# Patient Record
Sex: FEMALE | Race: BLACK OR AFRICAN AMERICAN | Employment: OTHER | URBAN - METROPOLITAN AREA
[De-identification: names, ages, dates, MRNs, and addresses within clinical notes are randomized per-mention and may not be internally consistent; named-entity substitution may affect disease eponyms.]

---

## 2019-05-29 ENCOUNTER — APPOINTMENT (OUTPATIENT)
Dept: GENERAL RADIOLOGY | Age: 84
End: 2019-05-29
Attending: EMERGENCY MEDICINE
Payer: MEDICARE

## 2019-05-29 ENCOUNTER — HOSPITAL ENCOUNTER (EMERGENCY)
Age: 84
Discharge: HOME | End: 2019-05-29
Attending: EMERGENCY MEDICINE
Payer: MEDICARE

## 2019-05-29 VITALS
DIASTOLIC BLOOD PRESSURE: 71 MMHG | BODY MASS INDEX: 22.33 KG/M2 | OXYGEN SATURATION: 99 % | WEIGHT: 134 LBS | RESPIRATION RATE: 16 BRPM | SYSTOLIC BLOOD PRESSURE: 153 MMHG | HEIGHT: 65 IN | HEART RATE: 58 BPM | TEMPERATURE: 98.1 F

## 2019-05-29 DIAGNOSIS — K59.00 CONSTIPATION, UNSPECIFIED CONSTIPATION TYPE: Primary | ICD-10-CM

## 2019-05-29 LAB
ALBUMIN SERPL-MCNC: 3.8 G/DL (ref 3.2–4.6)
ALBUMIN/GLOB SERPL: 1.1 {RATIO} (ref 1.2–3.5)
ALP SERPL-CCNC: 81 U/L (ref 50–136)
ALT SERPL-CCNC: 18 U/L (ref 12–65)
ANION GAP SERPL CALC-SCNC: 9 MMOL/L (ref 7–16)
AST SERPL-CCNC: 23 U/L (ref 15–37)
BASOPHILS # BLD: 0 K/UL (ref 0–0.2)
BASOPHILS NFR BLD: 0 % (ref 0–2)
BILIRUB SERPL-MCNC: 1.2 MG/DL (ref 0.2–1.1)
BUN SERPL-MCNC: 11 MG/DL (ref 8–23)
CALCIUM SERPL-MCNC: 10 MG/DL (ref 8.3–10.4)
CHLORIDE SERPL-SCNC: 103 MMOL/L (ref 98–107)
CO2 SERPL-SCNC: 28 MMOL/L (ref 21–32)
CREAT SERPL-MCNC: 0.91 MG/DL (ref 0.6–1)
DIFFERENTIAL METHOD BLD: ABNORMAL
EOSINOPHIL # BLD: 0 K/UL (ref 0–0.8)
EOSINOPHIL NFR BLD: 1 % (ref 0.5–7.8)
ERYTHROCYTE [DISTWIDTH] IN BLOOD BY AUTOMATED COUNT: 12.8 % (ref 11.9–14.6)
GLOBULIN SER CALC-MCNC: 3.6 G/DL (ref 2.3–3.5)
GLUCOSE SERPL-MCNC: 140 MG/DL (ref 65–100)
HCT VFR BLD AUTO: 34.9 % (ref 35.8–46.3)
HGB BLD-MCNC: 11.1 G/DL (ref 11.7–15.4)
IMM GRANULOCYTES # BLD AUTO: 0 K/UL (ref 0–0.5)
IMM GRANULOCYTES NFR BLD AUTO: 0 % (ref 0–5)
LIPASE SERPL-CCNC: 113 U/L (ref 73–393)
LYMPHOCYTES # BLD: 1.3 K/UL (ref 0.5–4.6)
LYMPHOCYTES NFR BLD: 18 % (ref 13–44)
MCH RBC QN AUTO: 27.9 PG (ref 26.1–32.9)
MCHC RBC AUTO-ENTMCNC: 31.8 G/DL (ref 31.4–35)
MCV RBC AUTO: 87.7 FL (ref 79.6–97.8)
MONOCYTES # BLD: 0.4 K/UL (ref 0.1–1.3)
MONOCYTES NFR BLD: 5 % (ref 4–12)
NEUTS SEG # BLD: 5.9 K/UL (ref 1.7–8.2)
NEUTS SEG NFR BLD: 77 % (ref 43–78)
NRBC # BLD: 0 K/UL (ref 0–0.2)
PLATELET # BLD AUTO: 227 K/UL (ref 150–450)
PMV BLD AUTO: 10.3 FL (ref 9.4–12.3)
POTASSIUM SERPL-SCNC: 3.5 MMOL/L (ref 3.5–5.1)
PROT SERPL-MCNC: 7.4 G/DL (ref 6.3–8.2)
RBC # BLD AUTO: 3.98 M/UL (ref 4.05–5.2)
SODIUM SERPL-SCNC: 140 MMOL/L (ref 136–145)
WBC # BLD AUTO: 7.6 K/UL (ref 4.3–11.1)

## 2019-05-29 PROCEDURE — 99282 EMERGENCY DEPT VISIT SF MDM: CPT | Performed by: EMERGENCY MEDICINE

## 2019-05-29 PROCEDURE — 83690 ASSAY OF LIPASE: CPT

## 2019-05-29 PROCEDURE — 80053 COMPREHEN METABOLIC PANEL: CPT

## 2019-05-29 PROCEDURE — 74022 RADEX COMPL AQT ABD SERIES: CPT

## 2019-05-29 PROCEDURE — 85025 COMPLETE CBC W/AUTO DIFF WBC: CPT

## 2019-05-29 RX ORDER — LEVOTHYROXINE SODIUM 100 UG/1
TABLET ORAL
COMMUNITY

## 2019-05-29 RX ORDER — MECLIZINE HYDROCHLORIDE 25 MG/1
TABLET ORAL
COMMUNITY

## 2019-05-29 RX ORDER — LISINOPRIL 10 MG/1
TABLET ORAL DAILY
COMMUNITY

## 2019-05-29 RX ORDER — DOCUSATE SODIUM 100 MG/1
100 CAPSULE, LIQUID FILLED ORAL 2 TIMES DAILY
Qty: 60 CAP | Refills: 0 | Status: SHIPPED | OUTPATIENT
Start: 2019-05-29 | End: 2019-08-27

## 2019-05-29 NOTE — ED NOTES
Patient and her family are refusing any lab work or imaging. Dr. Shanda Salter was notified, and went to speak with the patient and her family members. Once an agreement was made to have the labs done, patient was transported to [facility] .

## 2019-05-29 NOTE — ED NOTES
I have reviewed discharge instructions with the patient and son. The patient and son verbalized understanding. Patient left ED via Discharge Method: ambulatory to Home with family. Opportunity for questions and clarification provided. Patient given 1 scripts. To continue your aftercare when you leave the hospital, you may receive an automated call from our care team to check in on how you are doing. This is a free service and part of our promise to provide the best care and service to meet your aftercare needs.  If you have questions, or wish to unsubscribe from this service please call [phone]. Thank you for Choosing our [facility] Emergency Department.

## 2019-05-29 NOTE — ED PROVIDER NOTES
Patient is an 82 yo female with constipation. States intermittent episodes of constipation for the past 2 months and has been given polyethylene glycol in the past which helps however again was constipated today and took the polyethylene glycol again this morning and had a large BM on arrival today to the ED and states she feels much better. Denies any abdominal pain at this time, no nausea or vomiting, no fevers or chills, no further complaints. Overall patient is very well appearing, in NAD. Past Medical History:   Diagnosis Date    Diabetes ([facility] [address]     Hypertension        History reviewed. No pertinent surgical history. History reviewed. No pertinent family history.     Social History     Socioeconomic History    Marital status:      Spouse name: Not on file    Number of children: Not on file    Years of education: Not on file    Highest education level: Not on file   Occupational History    Not on file   Social Needs    Financial resource strain: Not on file    Food insecurity:     Worry: Not on file     Inability: Not on file    Transportation needs:     Medical: Not on file     Non-medical: Not on file   Tobacco Use    Smoking status: Not on file   Substance and Sexual Activity    Alcohol use: Not on file    Drug use: Not on file    Sexual activity: Not on file   Lifestyle    Physical activity:     Days per week: Not on file     Minutes per session: Not on file    Stress: Not on file   Relationships    Social connections:     Talks on phone: Not on file     Gets together: Not on file     Attends Protestant service: Not on file     Active member of club or organization: Not on file     Attends meetings of clubs or organizations: Not on file     Relationship status: Not on file    Intimate partner violence:     Fear of current or ex partner: Not on file     Emotionally abused: Not on file     Physically abused: Not on file     Forced sexual activity: Not on file   Other Topics Concern    Not on file   Social History Narrative    Not on file         ALLERGIES: Patient has no known allergies. Review of Systems   Constitutional: Negative for chills and fever. HENT: Negative for rhinorrhea and sore throat. Eyes: Negative for visual disturbance. Respiratory: Negative for cough and shortness of breath. Cardiovascular: Negative for chest pain and leg swelling. Gastrointestinal: Positive for constipation. Negative for abdominal pain, diarrhea, nausea and vomiting. Genitourinary: Negative for dysuria. Musculoskeletal: Negative for back pain and neck pain. Skin: Negative for rash. Neurological: Negative for weakness and headaches. Psychiatric/Behavioral: The patient is not nervous/anxious. Vitals:    05/29/19 1154   BP: 153/71   Pulse: (!) 58   Resp: 16   Temp: 98.1 °F (36.7 °C)   SpO2: 99%   Weight: 60.8 kg (134 lb)   Height: 5' 5\" (1.651 m)            Physical Exam   Constitutional: She is oriented to person, place, and time. She appears well-developed and well-nourished. HENT:   Head: Normocephalic. Right Ear: External ear normal.   Left Ear: External ear normal.   Eyes: Pupils are equal, round, and reactive to light. Conjunctivae and EOM are normal.   Neck: Normal range of motion. Neck supple. No tracheal deviation present. Cardiovascular: Normal rate, regular rhythm, normal heart sounds and intact distal pulses. No murmur heard. Pulmonary/Chest: Effort normal and breath sounds normal. No respiratory distress. Abdominal: Soft. There is no tenderness. Genitourinary:   Genitourinary Comments: Soft stool in vault without impaction. Fecal occult negative. Musculoskeletal: Normal range of motion. Neurological: She is alert and oriented to person, place, and time. No cranial nerve deficit. Skin: No rash noted. Nursing note and vitals reviewed.        MDM  Number of Diagnoses or Management Options  Constipation, unspecified constipation type: new and requires workup     Amount and/or Complexity of Data Reviewed  Clinical lab tests: ordered and reviewed  Tests in the radiology section of CPT®: ordered and reviewed  Review and summarize past medical records: yes    Risk of Complications, Morbidity, and/or Mortality  Presenting problems: high  Diagnostic procedures: high  Management options: high    Patient Progress  Patient progress: stable         Procedures  Recent Results (from the past 12 hour(s))   CBC WITH AUTOMATED DIFF    Collection Time: 05/29/19  4:04 PM   Result Value Ref Range    WBC 7.6 4.3 - 11.1 K/uL    RBC 3.98 (L) 4.05 - 5.2 M/uL    HGB 11.1 (L) 11.7 - 15.4 g/dL    HCT 34.9 (L) 35.8 - 46.3 %    MCV 87.7 79.6 - 97.8 FL    MCH 27.9 26.1 - 32.9 PG    MCHC 31.8 31.4 - 35.0 g/dL    RDW 12.8 11.9 - 14.6 %    PLATELET 194 366 - 950 K/uL    MPV 10.3 9.4 - 12.3 FL    ABSOLUTE NRBC 0.00 0.0 - 0.2 K/uL    DF AUTOMATED      NEUTROPHILS 77 43 - 78 %    LYMPHOCYTES 18 13 - 44 %    MONOCYTES 5 4.0 - 12.0 %    EOSINOPHILS 1 0.5 - 7.8 %    BASOPHILS 0 0.0 - 2.0 %    IMMATURE GRANULOCYTES 0 0.0 - 5.0 %    ABS. NEUTROPHILS 5.9 1.7 - 8.2 K/UL    ABS. LYMPHOCYTES 1.3 0.5 - 4.6 K/UL    ABS. MONOCYTES 0.4 0.1 - 1.3 K/UL    ABS. EOSINOPHILS 0.0 0.0 - 0.8 K/UL    ABS. BASOPHILS 0.0 0.0 - 0.2 K/UL    ABS. IMM. GRANS. 0.0 0.0 - 0.5 K/UL   METABOLIC PANEL, COMPREHENSIVE    Collection Time: 05/29/19  4:04 PM   Result Value Ref Range    Sodium 140 136 - 145 mmol/L    Potassium 3.5 3.5 - 5.1 mmol/L    Chloride 103 98 - 107 mmol/L    CO2 28 21 - 32 mmol/L    Anion gap 9 7 - 16 mmol/L    Glucose 140 (H) 65 - 100 mg/dL    BUN 11 8 - 23 MG/DL    Creatinine 0.91 0.6 - 1.0 MG/DL    GFR est AA >60 >60 ml/min/1.73m2    GFR est non-AA >60 >60 ml/min/1.73m2    Calcium 10.0 8.3 - 10.4 MG/DL    Bilirubin, total 1.2 (H) 0.2 - 1.1 MG/DL    ALT (SGPT) 18 12 - 65 U/L    AST (SGOT) 23 15 - 37 U/L    Alk.  phosphatase 81 50 - 136 U/L    Protein, total 7.4 6.3 - 8.2 g/dL    Albumin 3.8 3.2 - 4.6 g/dL    Globulin 3.6 (H) 2.3 - 3.5 g/dL    A-G Ratio 1.1 (L) 1.2 - 3.5     LIPASE    Collection Time: 05/29/19  4:04 PM   Result Value Ref Range    Lipase 113 73 - 393 U/L     Xr Abd Acute W 1 V Chest    Result Date: 5/29/2019  History: Constipation, chronic EXAM: Abdominal series FINDINGS: There is linear scarring in the left perihilar region. The bowel gas pattern is nonspecific without evidence of free air or obstruction. Stool noted in the rectum. IMPRESSION: No evidence of free air or obstruction. Large stool seen in the rectum. 82 yo with constipation:    I placed patient on colace and instructed her to take it DAILY and discussed importance to help keep her regular in her BMS. I reviewed chart from [facility] and patient being worked up for stromal tumor with CT performed 2 weeks ago and has been seen by GI and scheduled for further workup on the 11th of June which I stressed importance continuing this workup as well or to return with any further pain or any further concerns. Patient laughing, smiling and in full agreement with plan.

## 2019-05-29 NOTE — ED TRIAGE NOTES
Pt states she has been having constipation after taking metformin for a few days. States she took a laxative yesterday and had some watery stool but can still feel stool in rectum.

## 2019-09-11 ENCOUNTER — APPOINTMENT (OUTPATIENT)
Dept: CT IMAGING | Age: 84
End: 2019-09-11
Attending: EMERGENCY MEDICINE
Payer: MEDICARE

## 2019-09-11 ENCOUNTER — HOSPITAL ENCOUNTER (EMERGENCY)
Age: 84
Discharge: HOME | End: 2019-09-11
Attending: EMERGENCY MEDICINE
Payer: MEDICARE

## 2019-09-11 VITALS
OXYGEN SATURATION: 100 % | TEMPERATURE: 98 F | RESPIRATION RATE: 14 BRPM | WEIGHT: 134 LBS | HEIGHT: 65 IN | HEART RATE: 54 BPM | SYSTOLIC BLOOD PRESSURE: 188 MMHG | BODY MASS INDEX: 22.33 KG/M2 | DIASTOLIC BLOOD PRESSURE: 79 MMHG

## 2019-09-11 DIAGNOSIS — R42 VERTIGO: Primary | ICD-10-CM

## 2019-09-11 LAB
ALBUMIN SERPL-MCNC: 3.5 G/DL (ref 3.2–4.6)
ALBUMIN/GLOB SERPL: 0.9 {RATIO} (ref 1.2–3.5)
ALP SERPL-CCNC: 75 U/L (ref 50–130)
ALT SERPL-CCNC: 15 U/L (ref 12–65)
ANION GAP SERPL CALC-SCNC: 6 MMOL/L (ref 7–16)
AST SERPL-CCNC: 32 U/L (ref 15–37)
ATRIAL RATE: 52 BPM
BASOPHILS # BLD: 0 K/UL (ref 0–0.2)
BASOPHILS NFR BLD: 1 % (ref 0–2)
BILIRUB SERPL-MCNC: 0.9 MG/DL (ref 0.2–1.1)
BUN SERPL-MCNC: 17 MG/DL (ref 8–23)
CALCIUM SERPL-MCNC: 9.4 MG/DL (ref 8.3–10.4)
CALCULATED P AXIS, ECG09: 52 DEGREES
CALCULATED R AXIS, ECG10: 5 DEGREES
CALCULATED T AXIS, ECG11: 15 DEGREES
CHLORIDE SERPL-SCNC: 111 MMOL/L (ref 98–107)
CO2 SERPL-SCNC: 26 MMOL/L (ref 21–32)
CREAT SERPL-MCNC: 0.99 MG/DL (ref 0.6–1)
DIAGNOSIS, 93000: NORMAL
DIFFERENTIAL METHOD BLD: ABNORMAL
EOSINOPHIL # BLD: 0.2 K/UL (ref 0–0.8)
EOSINOPHIL NFR BLD: 4 % (ref 0.5–7.8)
ERYTHROCYTE [DISTWIDTH] IN BLOOD BY AUTOMATED COUNT: 15.5 % (ref 11.9–14.6)
GLOBULIN SER CALC-MCNC: 3.8 G/DL (ref 2.3–3.5)
GLUCOSE BLD STRIP.AUTO-MCNC: 111 MG/DL (ref 65–100)
GLUCOSE SERPL-MCNC: 116 MG/DL (ref 65–100)
HCT VFR BLD AUTO: 34.3 % (ref 35.8–46.3)
HGB BLD-MCNC: 10.7 G/DL (ref 11.7–15.4)
IMM GRANULOCYTES # BLD AUTO: 0 K/UL (ref 0–0.5)
IMM GRANULOCYTES NFR BLD AUTO: 0 % (ref 0–5)
INR PPP: 1
LYMPHOCYTES # BLD: 2.1 K/UL (ref 0.5–4.6)
LYMPHOCYTES NFR BLD: 34 % (ref 13–44)
MAGNESIUM SERPL-MCNC: 1.7 MG/DL (ref 1.8–2.4)
MCH RBC QN AUTO: 27.6 PG (ref 26.1–32.9)
MCHC RBC AUTO-ENTMCNC: 31.2 G/DL (ref 31.4–35)
MCV RBC AUTO: 88.4 FL (ref 79.6–97.8)
MONOCYTES # BLD: 0.5 K/UL (ref 0.1–1.3)
MONOCYTES NFR BLD: 7 % (ref 4–12)
NEUTS SEG # BLD: 3.4 K/UL (ref 1.7–8.2)
NEUTS SEG NFR BLD: 55 % (ref 43–78)
NRBC # BLD: 0 K/UL (ref 0–0.2)
P-R INTERVAL, ECG05: 124 MS
PLATELET # BLD AUTO: 213 K/UL (ref 150–450)
PMV BLD AUTO: 10.9 FL (ref 9.4–12.3)
POTASSIUM SERPL-SCNC: 5.1 MMOL/L (ref 3.5–5.1)
PROT SERPL-MCNC: 7.3 G/DL (ref 6.3–8.2)
PROTHROMBIN TIME: 13.3 SEC (ref 11.7–14.5)
Q-T INTERVAL, ECG07: 440 MS
QRS DURATION, ECG06: 80 MS
QTC CALCULATION (BEZET), ECG08: 409 MS
RBC # BLD AUTO: 3.88 M/UL (ref 4.05–5.2)
SODIUM SERPL-SCNC: 143 MMOL/L (ref 136–145)
TROPONIN I SERPL-MCNC: <0.02 NG/ML (ref 0.02–0.05)
TSH SERPL DL<=0.005 MIU/L-ACNC: 26.8 UIU/ML
VENTRICULAR RATE, ECG03: 52 BPM
WBC # BLD AUTO: 6.3 K/UL (ref 4.3–11.1)

## 2019-09-11 PROCEDURE — 83735 ASSAY OF MAGNESIUM: CPT

## 2019-09-11 PROCEDURE — 81003 URINALYSIS AUTO W/O SCOPE: CPT | Performed by: EMERGENCY MEDICINE

## 2019-09-11 PROCEDURE — 82962 GLUCOSE BLOOD TEST: CPT

## 2019-09-11 PROCEDURE — 99285 EMERGENCY DEPT VISIT HI MDM: CPT | Performed by: EMERGENCY MEDICINE

## 2019-09-11 PROCEDURE — 85025 COMPLETE CBC W/AUTO DIFF WBC: CPT

## 2019-09-11 PROCEDURE — 70450 CT HEAD/BRAIN W/O DYE: CPT

## 2019-09-11 PROCEDURE — 93005 ELECTROCARDIOGRAM TRACING: CPT | Performed by: EMERGENCY MEDICINE

## 2019-09-11 PROCEDURE — 85610 PROTHROMBIN TIME: CPT

## 2019-09-11 PROCEDURE — 84484 ASSAY OF TROPONIN QUANT: CPT

## 2019-09-11 PROCEDURE — 84443 ASSAY THYROID STIM HORMONE: CPT

## 2019-09-11 PROCEDURE — 80053 COMPREHEN METABOLIC PANEL: CPT

## 2019-09-11 RX ORDER — INSULIN PUMP SYRINGE, 3 ML
EACH MISCELLANEOUS
COMMUNITY
Start: 2019-05-01

## 2019-09-11 RX ORDER — ONDANSETRON 4 MG/1
4 TABLET, ORALLY DISINTEGRATING ORAL
COMMUNITY
Start: 2019-07-13 | End: 2019-09-11

## 2019-09-11 RX ORDER — AMLODIPINE BESYLATE 5 MG/1
TABLET ORAL
Refills: 3 | COMMUNITY
Start: 2019-07-16

## 2019-09-11 RX ORDER — LACTULOSE 10 G/15ML
10 SOLUTION ORAL
COMMUNITY
Start: 2019-07-13

## 2019-09-11 RX ORDER — ACETAMINOPHEN 325 MG/1
325 TABLET ORAL
COMMUNITY
Start: 2019-07-13

## 2019-09-11 RX ORDER — HYDROCHLOROTHIAZIDE 25 MG/1
TABLET ORAL
Refills: 0 | COMMUNITY
Start: 2019-08-10

## 2019-09-11 RX ORDER — METFORMIN HYDROCHLORIDE 500 MG/1
500 TABLET ORAL
COMMUNITY

## 2019-09-11 RX ORDER — IMATINIB MESYLATE 400 MG/1
400 TABLET, FILM COATED ORAL
COMMUNITY
Start: 2019-08-16

## 2019-09-11 RX ORDER — LANCETS 30 GAUGE
EACH MISCELLANEOUS
Refills: 3 | COMMUNITY
Start: 2019-06-05

## 2019-09-11 RX ORDER — DOXAZOSIN 4 MG/1
TABLET ORAL
Refills: 0 | COMMUNITY
Start: 2019-08-23

## 2019-09-11 RX ORDER — MEGESTROL ACETATE 40 MG/1
TABLET ORAL
Refills: 1 | COMMUNITY
Start: 2019-07-29

## 2019-09-11 RX ORDER — AMOXICILLIN 250 MG
2 CAPSULE ORAL
COMMUNITY
Start: 2019-05-14

## 2019-09-11 NOTE — ED NOTES
I have reviewed discharge instructions with the patient. The patient and caregiver verbalized understanding. Patient left ED via Discharge Method: wheelchair to Home with 2 sons. Opportunity for questions and clarification provided. Patient given 0 scripts. To continue your aftercare when you leave the hospital, you may receive an automated call from our care team to check in on how you are doing. This is a free service and part of our promise to provide the best care and service to meet your aftercare needs.  If you have questions, or wish to unsubscribe from this service please call [phone]. Thank you for Choosing our [facility] Emergency Department. Romana Gunn

## 2019-09-11 NOTE — ED NOTES
Pt resting on stretcher with 2 sons at bedside. Pt denies any needs at this time. Pt appears absent of distress.

## 2019-09-11 NOTE — ED PROVIDER NOTES
Lino Lorenzo is a 81 y.o. female who presents to the ED with a chief complaint of dizziness and vertigo. Family states she has had trouble with this intermittently for a few months. She was having a lot of vertigo in bed tonight and when she got up this morning she seemed a little groggy and slow to respond to them. They denied any obvious slurred speech to me. Patient did have stomach cancer removed back on July 8. She states she has no pain currently. She did report some palpitations and a little bit of chest pain yesterday. She also has some chronic problems with her right leg with associated injury from a fall and states that this is always weak. Currently she denies any neurologic deficit. She has been given antivert on previous episode of vertigo. The vertigo is worse with turning her head in certain movements. Past Medical History:   Diagnosis Date    Diabetes ([facility] [address]     Hypertension        History reviewed. No pertinent surgical history. History reviewed. No pertinent family history.     Social History     Socioeconomic History    Marital status:      Spouse name: Not on file    Number of children: Not on file    Years of education: Not on file    Highest education level: Not on file   Occupational History    Not on file   Social Needs    Financial resource strain: Not on file    Food insecurity:     Worry: Not on file     Inability: Not on file    Transportation needs:     Medical: Not on file     Non-medical: Not on file   Tobacco Use    Smoking status: Never Smoker    Smokeless tobacco: Never Used   Substance and Sexual Activity    Alcohol use: Not Currently    Drug use: Never    Sexual activity: Not on file   Lifestyle    Physical activity:     Days per week: Not on file     Minutes per session: Not on file    Stress: Not on file   Relationships    Social connections:     Talks on phone: Not on file     Gets together: Not on file     Attends Restoration service: Not on file     Active member of club or organization: Not on file     Attends meetings of clubs or organizations: Not on file     Relationship status: Not on file    Intimate partner violence:     Fear of current or ex partner: Not on file     Emotionally abused: Not on file     Physically abused: Not on file     Forced sexual activity: Not on file   Other Topics Concern    Not on file   Social History Narrative    Not on file         ALLERGIES: Patient has no known allergies. Review of Systems   Constitutional: Negative for activity change, chills and fever. HENT: Negative for ear discharge and ear pain. Respiratory: Negative for chest tightness, shortness of breath, wheezing and stridor. Cardiovascular: Positive for chest pain and palpitations. Gastrointestinal: Negative for abdominal pain, diarrhea, nausea and vomiting. Musculoskeletal: Negative for arthralgias. Skin: Negative. Neurological: Positive for dizziness. Negative for tremors, seizures, syncope, facial asymmetry, light-headedness and numbness. All other systems reviewed and are negative. Vitals:    09/11/19 0740   BP: (!) 215/87   Pulse: (!) 54   Resp: 16   Temp: 98 °F (36.7 °C)   SpO2: 100%   Weight: 60.8 kg (134 lb)   Height: 5' 5\" (1.651 m)            Physical Exam   Constitutional: She appears well-developed and well-nourished. No distress. HENT:   Head: Normocephalic and atraumatic. Right Ear: External ear normal.   Left Ear: External ear normal.   Mouth/Throat: Oropharynx is clear and moist.   Left TM is clear right TM occluded by cerumen. Eyes: Pupils are equal, round, and reactive to light. Conjunctivae and EOM are normal. No scleral icterus. Neck: Normal range of motion. Cardiovascular: Regular rhythm and normal heart sounds. Exam reveals no gallop and no friction rub. No murmur heard. Sinus bradycardia. Pulmonary/Chest: Effort normal. No stridor. No respiratory distress.  She has no wheezes. She has no rales. Abdominal: Soft. She exhibits no mass. There is no tenderness. There is no rebound and no guarding. Neurological: She is alert. She displays normal reflexes. No cranial nerve deficit. She exhibits normal muscle tone. Coordination normal.   Right leg has some weakness this is baseline per patient and the family. Otherwise patient has an NIH of 0. Skin: She is not diaphoretic. Psychiatric: She has a normal mood and affect. Her behavior is normal.   Nursing note and vitals reviewed. MDM  Number of Diagnoses or Management Options  Diagnosis management comments: I had a long discussion with patient's family about plan going forward. CT scan is negative. I discussed getting MRI today versus outpatient follow-up with ENT. Family wishes to follow-up with ENT first and return if symptoms worsen. There was cerumen occlusion of the right TM so we will irrigate this out to see if it helps. No current new neurologic findings. We also discussed patient's heart rate and need to follow-up with cardiology for sinus bradycardia. I doubt this is the reason for her vertigo though. Joseph Meier MD; 9/11/2019 @9:31 AM Voice dictation software was used during the making of this note. This software is not perfect and grammatical and other typographical errors may be present.   This note has not been proofread for errors.  ===================================================================          Amount and/or Complexity of Data Reviewed  Clinical lab tests: ordered and reviewed (Results for orders placed or performed during the hospital encounter of 09/11/19  -CBC WITH AUTOMATED DIFF       Result                      Value             Ref Range           WBC                         6.3               4.3 - 11.1 K*       RBC                         3.88 (L)          4.05 - 5.2 M*       HGB                         10.7 (L)          11.7 - 15.4 *       HCT                         34.3 (L)          35.8 - 46.3 %       MCV                         88.4              79.6 - 97.8 *       MCH                         27.6              26.1 - 32.9 *       MCHC                        31.2 (L)          31.4 - 35.0 *       RDW                         15.5 (H)          11.9 - 14.6 %       PLATELET                    213               150 - 450 K/*       MPV                         10.9              9.4 - 12.3 FL       ABSOLUTE NRBC               0.00              0.0 - 0.2 K/*       DF                          AUTOMATED                             NEUTROPHILS                 55                43 - 78 %           LYMPHOCYTES                 34                13 - 44 %           MONOCYTES                   7                 4.0 - 12.0 %        EOSINOPHILS                 4                 0.5 - 7.8 %         BASOPHILS                   1                 0.0 - 2.0 %         IMMATURE GRANULOCYTES       0                 0.0 - 5.0 %         ABS. NEUTROPHILS            3.4               1.7 - 8.2 K/*       ABS. LYMPHOCYTES            2.1               0.5 - 4.6 K/*       ABS. MONOCYTES              0.5               0.1 - 1.3 K/*       ABS. EOSINOPHILS            0.2               0.0 - 0.8 K/*       ABS. BASOPHILS              0.0               0.0 - 0.2 K/*       ABS. IMM.  GRANS.            0.0               0.0 - 0.5 K/*  -METABOLIC PANEL, COMPREHENSIVE       Result                      Value             Ref Range           Sodium                      143               136 - 145 mm*       Potassium                   5.1               3.5 - 5.1 mm*       Chloride                    111 (H)           98 - 107 mmo*       CO2                         26                21 - 32 mmol*       Anion gap                   6 (L)             7 - 16 mmol/L       Glucose                     116 (H)           65 - 100 mg/*       BUN                         17                8 - 23 MG/DL        Creatinine                  0.99 0.6 - 1.0 MG*       GFR est AA                  >60               >60 ml/min/1*       GFR est non-AA              57 (L)            >60 ml/min/1*       Calcium                     9.4               8.3 - 10.4 M*       Bilirubin, total            0.9               0.2 - 1.1 MG*       ALT (SGPT)                  15                12 - 65 U/L         AST (SGOT)                  32                15 - 37 U/L         Alk.  phosphatase            75                50 - 130 U/L        Protein, total              7.3               6.3 - 8.2 g/*       Albumin                     3.5               3.2 - 4.6 g/*       Globulin                    3.8 (H)           2.3 - 3.5 g/*       A-G Ratio                   0.9 (L)           1.2 - 3.5      -PROTHROMBIN TIME + INR       Result                      Value             Ref Range           Prothrombin time            13.3              11.7 - 14.5 *       INR                         1.0                              -TROPONIN I       Result                      Value             Ref Range           Troponin-I, Qt.             <0.02 (L)         0.02 - 0.05 *  -MAGNESIUM       Result                      Value             Ref Range           Magnesium                   1.7 (L)           1.8 - 2.4 mg*  -TSH 3RD GENERATION       Result                      Value             Ref Range           TSH                         26.800            uIU/mL         -GLUCOSE, POC       Result                      Value             Ref Range           Glucose (POC)               111 (H)           65 - 100 mg/* )           Procedures

## 2019-09-11 NOTE — DISCHARGE INSTRUCTIONS
Patient Education     Vertigo: Care Instructions  Your Care Instructions    Vertigo is the feeling that you or your surroundings are moving when there is no actual movement. It is often described as a feeling of spinning, whirling, falling, or tilting. Vertigo may make you vomit or feel nauseated. You may have trouble standing or walking and may lose your balance. Vertigo is often related to an inner ear problem, but it can have other more serious causes. If vertigo continues, you may need more tests to find its cause. Follow-up care is a key part of your treatment and safety. Be sure to make and go to all appointments, and call your doctor if you are having problems. It's also a good idea to know your test results and keep a list of the medicines you take. How can you care for yourself at home? · Do not lie flat on your back. Prop yourself up slightly. This may reduce the spinning feeling. Keep your eyes open. · Move slowly so that you do not fall. · If your doctor recommends medicine, take it exactly as directed. · Do not drive while you are having vertigo. Certain exercises, called Chester-Daroff exercises, can help decrease vertigo. To do Chester-Daroff exercises:  · Sit on the edge of a bed or sofa and quickly lie down on the side that causes the worst vertigo. Lie on your side with your ear down. · Stay in this position for at least 30 seconds or until the vertigo goes away. · Sit up. If this causes vertigo, wait for it to stop. · Repeat the procedure on the other side. · Repeat this 10 times. Do these exercises 2 times a day until the vertigo is gone. When should you call for help? Call 911 anytime you think you may need emergency care. For example, call if:    · You passed out (lost consciousness).     · You have symptoms of a stroke. These may include:  ? Sudden numbness, tingling, weakness, or loss of movement in your face, arm, or leg, especially on only one side of your body.   ? Sudden vision changes. ? Sudden trouble speaking. ? Sudden confusion or trouble understanding simple statements. ? Sudden problems with walking or balance. ? A sudden, severe headache that is different from past headaches.    Call your doctor now or seek immediate medical care if:    · Vertigo occurs with a fever, a headache, or ringing in your ears.     · You have new or increased nausea and vomiting.    Watch closely for changes in your health, and be sure to contact your doctor if:    · Vertigo gets worse or happens more often.     · Vertigo has not gotten better after 2 weeks. Where can you learn more? Go to http://andrey-susan.info/. Enter V737 in the search box to learn more about \"Vertigo: Care Instructions. \"  Current as of: October 21, 2018  Content Version: 12.1  © 6629-5627 [employer]. Care instructions adapted under license by [employer] (which disclaims liability or warranty for this information). If you have questions about a medical condition or this instruction, always ask your healthcare professional. [place] [address] any warranty or liability for your use of this information.

## 2019-09-11 NOTE — ED NOTES
Irrigated right ear with peroxide and NS solution. Remarkable amount of wax removed. MD notified for recheck.

## 2019-09-11 NOTE — ED TRIAGE NOTES
Pt in with family c/o dizziness since yesterday. States history of vertigo. States dizziness has been going on for several weeks. States intermittent slurred speech. Dr. Ashley Sommer in to evaluate pt. Pt describes dizziness at \"room is spinning\". States had surgery on July 8 to have remove tumor from abdomen.

## 2019-09-12 NOTE — PROGRESS NOTES
Patient's son presented to the ED, states that a referral must be faxed to [facility]. SW called, obtained the fax number ([phone]). SW faxed referral, no additional needs.      Tere WestLens    [address] [facility]  [email or web address]

## 2019-10-01 ENCOUNTER — HOSPITAL ENCOUNTER (OUTPATIENT)
Dept: PHYSICAL THERAPY | Age: 84
Discharge: HOME | End: 2019-10-01
Attending: PHYSICIAN ASSISTANT
Payer: MEDICARE

## 2019-10-01 PROCEDURE — 97162 PT EVAL MOD COMPLEX 30 MIN: CPT

## 2019-10-01 PROCEDURE — 97112 NEUROMUSCULAR REEDUCATION: CPT

## 2019-10-01 NOTE — THERAPY EVALUATION
Isrrael : 1935 [place] Secondary:  [facility] [place] [address] [facility], [address], [place] [address]. [phone]   [phone] OUTPATIENT PHYSICAL THERAPY:Initial Assessment 10/1/2019 ICD-10: Treatment Diagnosis: Difficulty in walking, not elsewhere classified (R26.2)/Benign paroxysmal vertigo, left ear (H81.12) Precautions/Allergies:  
Patient has no known allergies. TREATMENT PLAN: 
Effective Dates: 10/1/2019 TO 2019 (90 days). Frequency/Duration: 2 times a week for 90 Day(s) MEDICAL/REFERRING DIAGNOSIS: 
 BPPV (benign paroxysmal positional vertigo), left DATE OF ONSET: Over the past 7-8 months REFERRING PHYSICIAN: Asa Huertas PA-C MD Orders: Evaluate and treat Return MD Appointment: unknown INITIAL ASSESSMENT:  Ms. Masha Lucero presents with vertigo, imbalance, and difficulty walking. Patient is positive for left BPPV. BPPV will be treated and then any residual balance issues will be addressed. Patient is at higher fall risk based on history of falls and score received on Dynamic Gait Index. Patient would benefit from skilled physical therapy to address problems and goals. Thank you for this referral.     
PROBLEM LIST (Impacting functional limitations): 1. Decreased Strength 2. Decreased Ambulation Ability/Technique 3. Decreased Balance 4. Decreased [place] with Home Exercise Program 
5. Increased vertigo INTERVENTIONS PLANNED: (Treatment may consist of any combination of the following) 1. Balance Exercise 2. Gait Training 3. Home Exercise Program (HEP) 4. Canalith repositioning/habituation exercises if necessary GOALS: (Goals have been discussed and agreed upon with patient.) Short-Term Functional Goals: Time Frame: 30 days 1. Patient will be compliant with movement restrictions to resolve left BPPV. Discharge Goals: Time Frame: 90 days 1. Patient will report no vertigo getting up out of the bed or with head movements indicating resolution of left BPPV. 2. Patient will score greater than or equal to 19/24 on Dynamic Gait Index indicating improved balance and decreased fall risk with daily activities. 3. Patient will report improved stability with daily activities. OUTCOME MEASURE:  
Tool Used: Dynamic Gait Index Score:  Initial: 14/24 Most Recent: X/24 (Date: -- ) Interpretation of Score: Each section is scored on a 0-3 scale, 0 representing the patients inability to perform the task and 3 representing independence. The scores of each section are added together for a total score of 24. Any score below 19 indicates increased risk for falls. MEDICAL NECESSITY:  
· Patient is expected to demonstrate progress in balance, coordination, functional technique and vertigo to improve safety during activities of daily living. REASON FOR SERVICES/OTHER COMMENTS: 
· Patient continues to require skilled intervention due to higher fall risk with daily activities. Total Duration: PT Patient Time In/Time Out Time In: 1300 Time Out: 1350 Rehabilitation Potential For Stated Goals: Excellent Regarding Leathaamilcar Quirozvanessa's therapy, I certify that the treatment plan above will be carried out by a therapist or under their direction. Thank you for this referral, 
Kylie Lowe, PT Referring Physician Signature: Harmon Sicard, PA-C _______________________________ Date _____________ PAIN/SUBJECTIVE:  
Initial: Pain Intensity 1: 0  Post Session:  0/10 HISTORY:  
History of Injury/Illness (Reason for Referral): 
Patient reports balance and vertigo has gotten progressively worse over the past 7-8 months. Patient has had two falls over the past couple of months. Patient reports each fall happened due to her catching her toe and falling forward.   Patient rates current dizziness as 0/10 and pain level as 0/10. Patient describes vertigo as room spinning dizziness lasting less than a minute. Aggravating factors are getting up out of bed and head movements. Patient currently ambulating with no assistive device. Patient reports using a rolling walker at night when going to the bathroom. Past Medical History/Comorbidities: Ms. Barbara Fernandez  has a past medical history of Diabetes ([address] and Hypertension. Ms. Barbara Fernandez  has a past surgical history that includes hx other surgical (07/08/2019). Social History/Living Environment:  
  Lives with son in a one story home. Patient has no steps to enter her home. Prior Level of Function/Work/Activity: 
Independent. Retired. Dominant Side:  
      RIGHT Personal Factors:   
      Sex:  female Age:  81 y.o. Ambulatory/Rehab Services H2 Model Falls Risk Assessment Risk Factors: 
     (1)  Dizziness/Vertigo 
     (5)  History of Recent Falls [w/in 3 months] Ability to Rise from Chair: 
     (1)  Pushes up, successful in one attempt Falls Prevention Plan: 
     Exercise/Equipment Adaptation (specify):  Patient will be supervised at all times due to higher fall risk Total: (5 or greater = High Risk): 7  
©2010 [facility] [place] [address] [place] [address] States Patent #[id number]. Federal Law prohibits the replication, distribution or use without written permission from [facility] [employer] Current Medications:   
  
Current Outpatient Medications:  
  ofloxacin (FLOXIN) 0.3 % ophthalmic solution, Apply 2 Drops to eye four (4) times daily.  Indications: otitis media, Disp: 10 mL, Rfl: 1 
  magnesium oxide (MAG-OX) 400 mg tablet, Take 400 mg by mouth., Disp: , Rfl:  
  acetaminophen (TYLENOL) 325 mg tablet, Take 325 mg by mouth., Disp: , Rfl:  
  amLODIPine (NORVASC) 5 mg tablet, TAKE 1 TABLET (5 MG) BY MOUTH NIGHTLY, Disp: , Rfl: 3 
  glucose blood VI test strips (ASCENSIA AUTODISC VI, ONE TOUCH ULTRA TEST VI) strip, by Not Applicable route., Disp: , Rfl:  
  ONETOUCH ULTRA BLUE TEST STRIP strip, USE TO CHECK BLOOD SUGAR AS DIRECTED, Disp: , Rfl: 3   Blood-Glucose Meter monitoring kit, Use as instructed. Meter of patient's choice. , Disp: , Rfl:  
  doxazosin (CARDURA) 4 mg tablet, TAKE 1 TAB (4 MG TOTAL) BY MOUTH EVERY EVENING, Disp: , Rfl: 0 
  hydroCHLOROthiazide (HYDRODIURIL) 25 mg tablet, TAKE 1 TAB (25 MG TOTAL) BY MOUTH EVERY MORNING, Disp: , Rfl: 0 
  imatinib (GLEEVEC) 400 mg tablet, Take 400 mg by mouth., Disp: , Rfl:  
  lactulose (CHRONULAC) 20 gram/30 mL soln solution, Take 10 g by mouth., Disp: , Rfl:  
  ONETOUCH DELICA LANCETS 30 gauge misc, USE TO CHECK BLOOD SUGAR DAILY, Disp: , Rfl: 3 
  megestrol (MEGACE) 40 mg tablet, TAKE 1 TABLET BY MOUTH EVERY DAY, Disp: , Rfl: 1 
  metFORMIN (GLUCOPHAGE) 500 mg tablet, Take 500 mg by mouth., Disp: , Rfl:  
  senna-docusate (PERICOLACE) 8.6-50 mg per tablet, Take 2 Tabs by mouth., Disp: , Rfl:  
  levothyroxine (SYNTHROID) 100 mcg tablet, Take  by mouth Daily (before breakfast). , Disp: , Rfl:  
  lisinopril (PRINIVIL, ZESTRIL) 10 mg tablet, Take  by mouth daily. , Disp: , Rfl:  
  meclizine (ANTIVERT) 25 mg tablet, Take  by mouth three (3) times daily as needed. , Disp: , Rfl:   
Date Last Reviewed:  10/1/2019 Number of Personal Factors/Comorbidities that affect the Plan of Care: 1-2: MODERATE COMPLEXITY EXAMINATION:  
Observation/Orthostatic Postural Assessment: Forward head/rounded shoulders posture. Functional Mobility:  
      Gait/Ambulation:  Patient ambulates without assistive device demonstrating shuffling gait pattern. Patient has foot drop on right side due to peripheral neuropathy. Talked to patient about AFO. Patient and her sons did not seem interested in AFO. Strength:   
      Bilateral lower extremity hip/knee strength 5/5.   Ankle strength is as follows:  Dorsiflexion right 1/5 and left 3+/5 and plantarflexion 4/5. Sensation:  
      Decreased bilateral feet. Postural Control & Balance: · Reich Balance Scale:  Not tested. · Dynamic Gait Index:  14/24.   (A score less than or equal to19/24 is abnormal and predictive of falls) Position Tests: 
· Hallpike-[place] testing presented as positive when head is turned to the left indicating that Benign Paroxysmal Positional Vertigo (BPPV) is present on the left. Body Structures Involved: 1. Eyes and Ears 2. Muscles Body Functions Affected: 1. Neuromusculoskeletal Activities and Participation Affected: 1. General Tasks and Demands 2. Mobility 3. Interpersonal Interactions and Relationships 4. Community, Social and [place] Number of elements (examined above) that affect the Plan of Care: 3: MODERATE COMPLEXITY CLINICAL PRESENTATION:  
Presentation: Evolving clinical presentation with changing clinical characteristics: MODERATE COMPLEXITY CLINICAL DECISION MAKING:  
Use of outcome tool(s) and clinical judgement create a POC that gives a: Questionable prediction of patient's progress: MODERATE COMPLEXITY

## 2019-10-01 NOTE — PROGRESS NOTES
Leatha Cotton  : 1935  [place]  Secondary:  [address] at [facility]  [address] [facility], [address] [place] [address],[address], [facility] [address]  [phone]   [phone]     OUTPATIENT PHYSICAL THERAPY: Daily Treatment Note 10/1/2019  Visit Count:  1    TREATMENT PLAN:  Effective Dates: 10/1/2019 TO 2019 (90 days). Frequency/Duration: 2 times a week for 90 Day(s)    Pre-treatment Symptoms/Complaints:  Vertigo and imbalance  Pain: Initial: Pain Intensity 1: 0  Post Session:  0/10   Medications Last Reviewed:  10/1/2019  Updated Objective Findings:  See evaluation note from today  TREATMENT:     NEUROMUSCULAR RE-EDUCATION: (15 minutes):  Exercise/activities per grid below to improve vertigo. Required minimal verbal cues to promote correct body alignment. Date:  10/1/2019 Date:   Date:     Activity/Exercise Parameters Parameters Parameters   Left Pamela Hallpike test Positive     Left Epley manuever 3 reps     BPPV education 5 mintues                                 [facility] Portal  Treatment/Session Summary:    · Response to Treatment:  Tolerated treatment well. · Communication/Consultation:  None today  · Equipment provided today:  Article given explaining BPPV and twenty-four hour movement restrictions  · Recommendations/Intent for next treatment session: Next visit will focus on recheck left BPPV and balance exercises.     Total Treatment Billable Duration:  15 minutes  PT Patient Time In/Time Out  Time In: 1300  Time Out: [address], PT    Future Appointments   Date Time Provider Attila Jiang   10/7/2019  2:30 PM [facility]

## 2019-10-07 ENCOUNTER — HOSPITAL ENCOUNTER (OUTPATIENT)
Dept: PHYSICAL THERAPY | Age: 84
Discharge: HOME | End: 2019-10-07
Attending: PHYSICIAN ASSISTANT
Payer: MEDICARE

## 2019-10-07 PROCEDURE — 97112 NEUROMUSCULAR REEDUCATION: CPT

## 2019-10-07 NOTE — PROGRESS NOTES
Leatha Cotton  : 1935  [place]  Secondary:  [address]  at [place] [address], [place] [address].  [phone]   [phone]     OUTPATIENT PHYSICAL THERAPY: Daily Treatment Note 10/7/2019  Visit Count:  2    TREATMENT PLAN:  Effective Dates: 10/1/2019 TO 2019 (90 days). Frequency/Duration: 2 times a week for 90 Day(s)    Pre-treatment Symptoms/Complaints:  Vertigo and imbalance; No falls. Did okay after last time. Felt like balance was better. Still getting dizzy getting out of bed. Pain: Initial: Pain Intensity 1: 0  Post Session:  0/10   Medications Last Reviewed:  10/7/2019  Updated Objective Findings:  None Today  TREATMENT:     NEUROMUSCULAR RE-EDUCATION: (45minutes):  Exercise/activities per grid below to improve balance, coordination, kinesthetic sense and vertigo. Required minimal verbal cues to promote static and dynamic balance in standing. Date:  10/1/2019 Date:  10/7/19   Date:     Activity/Exercise Parameters Parameters Parameters   Left Pamela Hallpike test Positive positive    Left Epley manuever 3 reps 2 reps    BPPV education 5 mintues     Marching       sidestepping      Step taps  6 inch step 2 x 20 reps    Step overs  Over 1/2 foam roll x 15 reps each leg    Walking with head turns      Walking with head up/down      Static standing balance  Blue foams eyes open and closed with feet apart and semitandem    walking  In clinic, good balance, no dizziness        [facility] Portal  Treatment/Session Summary:    · Response to Treatment:  Patient tolerated session well. I'm questioning whether she does have BPPV or other vestibular dysfunction as nystagmus lasts longer than it should with BPPV.   Patient tolerated session well and verbalized feeling well at end of session2  · Communication/Consultation:  None today  · Equipment provided today:  None today  · Recommendations/Intent for next treatment session: Next visit will focus on recheck left BPPV and balance exercises.     Total Treatment Billable Duration:  45 minutes  PT Patient Time In/Time Out  Time In: 1210  Time Out: [address], PT    Future Appointments   Date Time Provider Attila Jiang   10/9/2019  9:30 AM Octavio John, PT [facility]   10/14/2019  9:30 AM Terri Owens, PT [facility]   10/17/2019  3:15 PM Terri Owens, PT [facility]   10/21/2019  9:30 AM Terri Owens, PT [facility]   10/23/2019  9:30 AM Octavio John, PT [facility]   10/31/2019  3:15 PM Terri Owens, PT [facility]

## 2019-10-09 ENCOUNTER — HOSPITAL ENCOUNTER (OUTPATIENT)
Dept: PHYSICAL THERAPY | Age: 84
Discharge: HOME | End: 2019-10-09
Attending: PHYSICIAN ASSISTANT
Payer: MEDICARE

## 2019-10-09 PROCEDURE — 97112 NEUROMUSCULAR REEDUCATION: CPT

## 2019-10-09 NOTE — PROGRESS NOTES
Leatha Cotton  : 1935  [place]  Secondary:  [address] at [place] [address] [facility], [address], [facility] [address]  [phone]   [phone]     OUTPATIENT PHYSICAL THERAPY: Daily Treatment Note 10/9/2019  Visit Count:  3    TREATMENT PLAN:  Effective Dates: 10/1/2019 TO 2019 (90 days). Frequency/Duration: 2 times a week for 90 Day(s)    Pre-treatment Symptoms/Complaints:  Vertigo and imbalance; No falls. Sometimes I'm dizzy and sometimes I'm not. Pain: Initial: Pain Intensity 1: 0  Post Session:  0/10   Medications Last Reviewed:  10/9/2019  Updated Objective Findings:  None Today  TREATMENT:     NEUROMUSCULAR RE-EDUCATION: (43 minutes):  Exercise/activities per grid below to improve balance, coordination, kinesthetic sense and vertigo. Required minimal verbal cues to promote static and dynamic balance in standing. Date:  10/1/2019 Date:  10/7/19   Date:  10/9/19     Activity/Exercise Parameters Parameters Parameters   Left Eola Hallpike test Positive positive positive   Left Epley manuever 3 reps 2 reps 3 times   BPPV education 5 mintues     x1 viewing   Seated 30 sec 3 times each direction with verbal cuing, added to HEP   Marching       sidestepping      Step taps  6 inch step 2 x 20 reps    Step overs  Over 1/2 foam roll x 15 reps each leg    Walking with head turns   4 laps in hallway   Walking with head up/down      Static standing balance  Blue foams eyes open and closed with feet apart and semitandem Blue foams eyes open and closed with feet apart and semitandem   walking  In clinic, good balance, no dizziness In clinic, good balance, no dizziness       [facility] Portal  Treatment/Session Summary:    · Response to Treatment:  Patient tolerated session well. Patient is feeling better each time. I still think she may have some vestibular hypofunction in addition to/instead of BPPV, but patient is feeling better overall.  Added x1 viewing which took some cuing but patient performed it correctly by end of session. Patient tolerated session well and verbalized feeling well at end of session. · Communication/Consultation:  None today  · Equipment provided today:  None today  · Recommendations/Intent for next treatment session: Next visit will focus on recheck left BPPV and balance exercises.     Total Treatment Billable Duration:  43.  minutes  PT Patient Time In/Time Out  Time In: 0930  Time Out: [address], PT    Future Appointments   Date Time Provider Attila Jiang   10/14/2019  9:30 AM Emigdio Steel, PT [facility]   10/17/2019  3:15 PM Esau Owens, PT [facility]   10/21/2019  9:30 AM Esau Owens, PT [facility]   10/23/2019  9:30 AM Katlyn Reyes PT [facility]   10/31/2019  3:15 PM Esau Owens, PT [facility]

## 2019-10-14 ENCOUNTER — HOSPITAL ENCOUNTER (OUTPATIENT)
Dept: PHYSICAL THERAPY | Age: 84
Discharge: HOME | End: 2019-10-14
Attending: PHYSICIAN ASSISTANT
Payer: MEDICARE

## 2019-10-14 PROCEDURE — 97112 NEUROMUSCULAR REEDUCATION: CPT

## 2019-10-14 NOTE — PROGRESS NOTES
Leatha Cotton  : 1935  [place]  Secondary:  [address] at [place] [address] [facility], [address], [place] [address].  [phone]   [phone]     OUTPATIENT PHYSICAL THERAPY: Daily Treatment Note 10/14/2019  Visit Count:  4    TREATMENT PLAN:  Effective Dates: 10/1/2019 TO 2019 (90 days). Frequency/Duration: 2 times a week for 90 Day(s)    Pre-treatment Symptoms/Complaints:  \"Doing better,  I went to [employer] for the first time in awhile over the weekend\". Pain: Initial: Pain Intensity 1: 0  Post Session:  0/10   Medications Last Reviewed:  10/14/2019  Updated Objective Findings:  None Today  TREATMENT:     NEUROMUSCULAR RE-EDUCATION: (45 minutes):  Exercise/activities per grid below to improve balance, coordination, kinesthetic sense and vertigo. Required minimal verbal cues to promote static and dynamic balance in standing. Date:  10/14/2019 Date:  10/7/19   Date:  10/9/19     Activity/Exercise Parameters Parameters Parameters   Left Pamela Hallpike test Positive positive positive   Left Epley manuever 2 reps 2 reps 3 times   Chester-Daroff exercises Issued as HEP. Demonstrated to patient      x1 viewing Seated 30 sec 3 times each direction with verbal cuing.   Seated 30 sec 3 times each direction with verbal cuing, added to HEP   Marching  4 laps     sidestepping      Step taps 6 inch step 2 x 20 reps 6 inch step 2 x 20 reps    Step overs Over 1/2 foam roll x 15 reps each leg Over 1/2 foam roll x 15 reps each leg    Walking with head turns 4 laps  4 laps in hallway   Walking with head up/down 4 laps     Static standing balance  Blue foams eyes open and closed with feet apart and semitandem Blue foams eyes open and closed with feet apart and semitandem   Walking through cones 4 laps     Standing on sanddune Eyes open with head turns and eyes closed     Tiltboard weight shifting- forward/backward Eyes open     Standing in tiltboard Eyes open with head turns and eyes closed     walking  In clinic, good balance, no dizziness In clinic, good balance, no dizziness       [facility] Portal  Treatment/Session Summary:    · Response to Treatment:  Patient demonstrates improved balance since beginning therapy. Patient still complains of mild vertigo with left [place]-Hallpike test.  Patient issued Brand-Daroff exercises. · Communication/Consultation:  None today  · Equipment provided today:  Home exercise program  · Recommendations/Intent for next treatment session: Next visit will focus on recheck left BPPV and balance exercises.     Total Treatment Billable Duration:  45.  minutes  PT Patient Time In/Time Out  Time In: 9882  Time Out: [facility] , PT    Future Appointments   Date Time Provider Attila Jiang   10/17/2019  3:15 PM Alvino Keller PT [facility] SFKARI   10/21/2019  9:30 AM Arnie Owens, PT [facility]   10/23/2019  9:30 AM [facility] Nimisha, PT [facility]   10/31/2019  3:15 PM Arnie Owens, PT [facility]

## 2019-10-17 ENCOUNTER — HOSPITAL ENCOUNTER (OUTPATIENT)
Dept: PHYSICAL THERAPY | Age: 84
Discharge: HOME | End: 2019-10-17
Attending: PHYSICIAN ASSISTANT
Payer: MEDICARE

## 2019-10-17 PROCEDURE — 97112 NEUROMUSCULAR REEDUCATION: CPT

## 2019-10-17 NOTE — PROGRESS NOTES
Leatha Cotton  : 1935  [place]  Secondary:  [address] at [place] [address] [facility], [address], [address] [facility]  [phone]   [phone]     OUTPATIENT PHYSICAL THERAPY: Daily Treatment Note 10/17/2019  Visit Count:  5    TREATMENT PLAN:  Effective Dates: 10/1/2019 TO 2019 (90 days). Frequency/Duration: 2 times a week for 90 Day(s)    Pre-treatment Symptoms/Complaints:  \"Not getting dizzy as much\". Pain: Initial: Pain Intensity 1: 0  Post Session:  0/10   Medications Last Reviewed:  10/17/2019  Updated Objective Findings:  None Today  TREATMENT:     NEUROMUSCULAR RE-EDUCATION: (45 minutes):  Exercise/activities per grid below to improve balance, coordination, kinesthetic sense and vertigo. Required minimal verbal cues to promote static and dynamic balance in standing. Date:  10/14/2019 Date:  10/17/19   Date:  10/9/19     Activity/Exercise Parameters Parameters Parameters   Left Pamela Hallpike test Positive  positive   Left Epley manuever 2 reps  3 times   Chester-Daroff exercises Issued as HEP. Demonstrated to patient  5 reps bilateral    x1 viewing Seated 30 sec 3 times each direction with verbal cuing.   Seated 30 sec 3 times each direction with verbal cuing, added to HEP   Marching  4 laps 4 laps    sidestepping      Step taps 6 inch step 2 x 20 reps     Step overs Over 1/2 foam roll x 15 reps each leg     Walking with head turns 4 laps 4 laps 4 laps in hallway   Walking with head up/down 4 laps 4 laps    Static standing balance  Gray foam eyes open with head turns and eyes closed Blue foams eyes open and closed with feet apart and semitandem   Walking through cones 4 laps 4 laps    Standing on sanddune Eyes open with head turns and eyes closed     Tiltboard weight shifting- forward/backward Eyes open Eyes open and left and right shifting    Standing in tiltboard Eyes open with head turns and eyes closed Eyes open with head turns and eyes closed    Figure eights  2x2 reps no dizziness    Circles right/circles  2x2 reps. Increased dizziness                    [facility] Portal  Treatment/Session Summary:    · Response to Treatment:  Patient tolerated treatment well. Patient reports increased dizziness with Chester-Daroff exercises and circles. Dizzines decreased quickly with rest.  · Communication/Consultation:  None today  · Equipment provided today:  None today  · Recommendations/Intent for next treatment session: Next visit will focus on recheck left BPPV and balance exercises.     Total Treatment Billable Duration:  45.  minutes  PT Patient Time In/Time Out  Time In: 9925  Time Out: [address], PT    Future Appointments   Date Time Provider Attila Jiang   10/21/2019  9:30 AM Jeniffer Lizama PT [facility] ADARSH   10/23/2019  9:30 AM Lawanda Bronson PT [facility]   10/31/2019  3:15 PM Augustin Owens, PT [facility]

## 2019-10-21 ENCOUNTER — HOSPITAL ENCOUNTER (OUTPATIENT)
Dept: PHYSICAL THERAPY | Age: 84
Discharge: HOME | End: 2019-10-21
Attending: PHYSICIAN ASSISTANT
Payer: MEDICARE

## 2019-10-21 NOTE — PROGRESS NOTES
Leatha Cotton  : 1935  [place]  Secondary:  [facility] at [place] [address], [place] [address].  [phone]   [phone]     OUTPATIENT DAILY NOTE    NAME/AGE/GENDER: Nyla Sheldon is a 81 y.o. female. DATE: 10/21/2019    Ms. Cotton CANCELED for today's appointment due to illness.     Christiano Yang, PT

## 2019-10-23 ENCOUNTER — HOSPITAL ENCOUNTER (OUTPATIENT)
Dept: PHYSICAL THERAPY | Age: 84
Discharge: HOME | End: 2019-10-23
Attending: PHYSICIAN ASSISTANT
Payer: MEDICARE

## 2019-10-23 PROCEDURE — 97112 NEUROMUSCULAR REEDUCATION: CPT

## 2019-10-23 NOTE — PROGRESS NOTES
Leatha Cotton  : 1935  [place]  Secondary:  [address] at [place] [id number] [facility], [address], [facility] [address]  [phone]   [phone]     OUTPATIENT PHYSICAL THERAPY: Daily Treatment Note 10/23/2019  Visit Count:  6    TREATMENT PLAN:  Effective Dates: 10/1/2019 TO 2019 (90 days). Frequency/Duration: 2 times a week for 90 Day(s)    Pre-treatment Symptoms/Complaints:  Doing better. Balance is getting better. No dizziness right now. Pain: Initial: Pain Intensity 1: 0  Post Session:  0/10   Medications Last Reviewed:  10/23/2019  Updated Objective Findings:  None Today  TREATMENT:     NEUROMUSCULAR RE-EDUCATION: (50 minutes):  Exercise/activities per grid below to improve balance, coordination, kinesthetic sense and vertigo. Required minimal verbal cues to promote static and dynamic balance in standing. Date:  10/23/19     Activity/Exercise Parameters   Left Clide Sober test    Left Epley manuever    Chester-Daroff exercises 1 rep to R, 5 reps to L, decreased dizziness to L with repetition. x1 viewing    Marching  4 laps   sidestepping 4 laps   Step taps    Step overs    Walking with head turns 4 laps   Walking with head up/down 4 laps   Static standing balance Blue foam eyes open with head turns and eyes closed   Walking through cones 4 laps   Standing on sanddune    Tiltboard weight shifting- forward/backward    Standing in tiltboard    Figure eights 2x2 reps no dizziness   Circles right/circles 2x2 reps. Increased dizziness               [facility] Portal  Treatment/Session Summary:    · Response to Treatment:  Patient tolerated treatment well. Balance is improving. She still gets dizzy with L sidelying but it looks more like a vestibular weakness than BPPV at this point. Patient says she has still been sleeping on R side on 2 pillows.  Told patient she could sleep on L side on one pillow, and that she may get dizzy but she knows that it resolves quickly. Will see how patient tolerated this next session. Continue to progress as tolerated.    · Communication/Consultation:  None today  · Equipment provided today:  None today  · Recommendations/Intent for next treatment session: Next visit will focus on balance and habituation training    Total Treatment Billable Duration:  50  minutes  PT Patient Time In/Time Out  Time In: 0930  Time Out: 17[address] Milton Varghese, PT    Future Appointments   Date Time Provider Attila Jiang   10/28/2019 11:00 AM Galina Castañeda, PT [facility] ADARSH   10/31/2019  3:15 PM [facility]

## 2019-10-28 ENCOUNTER — HOSPITAL ENCOUNTER (OUTPATIENT)
Dept: PHYSICAL THERAPY | Age: 84
Discharge: HOME | End: 2019-10-28
Attending: PHYSICIAN ASSISTANT
Payer: MEDICARE

## 2019-10-28 PROCEDURE — 97112 NEUROMUSCULAR REEDUCATION: CPT

## 2019-10-28 NOTE — PROGRESS NOTES
Leatha Cotton  : 1935  [place]  Secondary:  [address] at [place] [id number] [facility], [address], [place] [address].  [phone]   [phone]     OUTPATIENT PHYSICAL THERAPY: Daily Treatment Note 10/28/2019  Visit Count:  7    TREATMENT PLAN:  Effective Dates: 10/1/2019 TO 2019 (90 days). Frequency/Duration: 2 times a week for 90 Day(s)    Pre-treatment Symptoms/Complaints:  \"I am ready to be discharged\". Pain: Initial: Pain Intensity 1: 0  Post Session:  0/10   Medications Last Reviewed:  10/28/2019  Updated Objective Findings:  None Today  TREATMENT:     NEUROMUSCULAR RE-EDUCATION: (45 minutes):  Exercise/activities per grid below to improve balance, coordination, kinesthetic sense and vertigo. Required minimal verbal cues to promote static and dynamic balance in standing. Date:  10/28/19     Activity/Exercise Parameters   Left Claudette Moons test    Left Epley manuever    Chester-Daroff exercises 5 reps bilateral   x1 viewing    Marching  4 laps   sidestepping 4 laps   Step taps    Step overs    Walking with head turns 4 laps   Walking with head up/down 4 laps   Static standing balance Tiltboard eyes open with head turns and eyes closed. Weight shifting left/ right   Walking through cones 4 laps   Standing on sanddune    Tiltboard weight shifting- forward/backward    Standing in tiltboard    Walking backwards 4 laps                   [facility] Portal  Treatment/Session Summary:    · Response to Treatment:  Patient demonstrates improved balance since beginning therapy. · Communication/Consultation:  None today  · Equipment provided today:  None today  · Recommendations/Intent for next treatment session: Next visit will focus on balance and habituation training. Will reassess for potential discharge.     Total Treatment Billable Duration:  45  minutes  PT Patient Time In/Time Out  Time In: 1100  Time Out: 1010 [facility], PT    Future Appointments   Date Time Provider Attila Deidre   10/31/2019  3:15 PM Jorge Owens, PT [facility] LILLIEE

## 2019-10-31 ENCOUNTER — HOSPITAL ENCOUNTER (OUTPATIENT)
Dept: PHYSICAL THERAPY | Age: 84
Discharge: HOME | End: 2019-10-31
Attending: PHYSICIAN ASSISTANT
Payer: MEDICARE

## 2019-10-31 PROCEDURE — 97112 NEUROMUSCULAR REEDUCATION: CPT

## 2019-10-31 NOTE — PROGRESS NOTES
Leatha Cotton  : 1935  [place]  Secondary:  [address] at [place] [id number] [facility], [address], [facility].  [phone]   [phone]     OUTPATIENT PHYSICAL THERAPY: Daily Treatment Note 10/31/2019  Visit Count:  8    TREATMENT PLAN:  Effective Dates: 10/1/2019 TO 2019 (90 days). Frequency/Duration: 2 times a week for 90 Day(s)    Pre-treatment Symptoms/Complaints: \"My balance is so much better. I no longer have any bad dizziness\". Pain: Initial: Pain Intensity 1: 0  Post Session:  0/10   Medications Last Reviewed:  10/31/2019  Updated Objective Findings:  See discharge note  TREATMENT:     NEUROMUSCULAR RE-EDUCATION: (30 minutes):  Exercise/activities per grid below to improve balance, coordination, kinesthetic sense and vertigo. Required minimal verbal cues to promote static and dynamic balance in standing. Date:  10/31/19     Activity/Exercise Parameters   Left [place] Hallpike test    Left Epley manuever    Chester-Daroff exercises    x1 viewing    Marching  4 laps   sidestepping 4 laps   Step taps 6 inch  2x10B   Step overs 2x10 reps forward/lateral   Walking with head turns 4 laps   Walking with head up/down 4 laps   Static standing balance Tiltboard eyes open and eyes closed   Step ups 6 inch   2x10 reps   Standing on sanddune Eyes open and eyes closed   Tiltboard weight shifting- forward/backward    Standing in tiltboard    Walking backwards                    [employer] Portal  Treatment/Session Summary:    · Response to Treatment:  Patient demonstrates improved balance since beginning therapy. · Communication/Consultation:  None today  · Equipment provided today:  None today  · Recommendations/Intent for next treatment session: Patient discharged due to meeting all of her goals.     Total Treatment Billable Duration:  30 minutes  PT Patient Time In/Time Out  Time In: 1515  Time Out: [address] Owens PT    No future appointments.

## 2019-10-31 NOTE — THERAPY DISCHARGE
Isrrael : 1935 [place] Secondary:  [facility] at [facility] [address], [address], [place] [address]. [phone]   [phone] OUTPATIENT PHYSICAL THERAPY:Discharge Summary 10/31/2019 ICD-10: Treatment Diagnosis: Difficulty in walking, not elsewhere classified (R26.2)/Benign paroxysmal vertigo, left ear (H81.12) Precautions/Allergies:  
Patient has no known allergies. TREATMENT PLAN: 
Effective Dates: 10/1/2019 TO 2019 (90 days). Frequency/Duration: Patient discharged from physical therapy. MEDICAL/REFERRING DIAGNOSIS: 
 BPPV (benign paroxysmal positional vertigo), left DATE OF ONSET: Over the past 7-8 months REFERRING PHYSICIAN: Sumanth Lafleur PA-C MD Orders: Evaluate and treat Return MD Appointment: unknown INITIAL ASSESSMENT:  Ms. David Leal presents with vertigo, imbalance, and difficulty walking. Patient is positive for left BPPV. BPPV will be treated and then any residual balance issues will be addressed. Patient is at higher fall risk based on history of falls and score received on Dynamic Gait Index. Patient would benefit from skilled physical therapy to address problems and goals. Thank you for this referral.   
Discharge note:  Patient attended eight scheduled physical therapy appointments from 10/1/2019 to 10/31/2019. Patient demonstrates improved balance since beginning therapy. Patient negative for bilateral BPPV. Patient discharged due to meeting all of her goals. Thank you for this referral.    
PROBLEM LIST (Impacting functional limitations): 1. Decreased Strength 2. Decreased Ambulation Ability/Technique 3. Decreased Balance 4. Decreased Posey with Home Exercise Program 
5. Increased vertigo INTERVENTIONS PLANNED: (Treatment may consist of any combination of the following) 1. Balance Exercise 2. Gait Training 3. Home Exercise Program (HEP) 4. Canalith repositioning/habituation exercises if necessary GOALS: (Goals have been discussed and agreed upon with patient.) Short-Term Functional Goals: Time Frame: 30 days 1. Patient will be compliant with movement restrictions to resolve left BPPV. Goal met. Discharge Goals: Time Frame: 90 days 1. Patient will report no vertigo getting up out of the bed or with head movements indicating resolution of left BPPV. Goal met. 2. Patient will score greater than or equal to 19/24 on Dynamic Gait Index indicating improved balance and decreased fall risk with daily activities. Goal met. 3. Patient will report improved stability with daily activities. Goal met. OUTCOME MEASURE:  
Tool Used: Dynamic Gait Index Score:  Initial: 14/24 Most Recent: 21/24 (Date: 10- ) Interpretation of Score: Each section is scored on a 0-3 scale, 0 representing the patients inability to perform the task and 3 representing independence. The scores of each section are added together for a total score of 24. Any score below 19 indicates increased risk for falls. PAIN/SUBJECTIVE:  
Initial: Pain Intensity 1: 0  Post Session:  0/10 HISTORY:  
History of Injury/Illness (Reason for Referral): 
Patient reports balance and vertigo has gotten progressively worse over the past 7-8 months. Patient has had two falls over the past couple of months. Patient reports each fall happened due to her catching her toe and falling forward. Patient rates current dizziness as 0/10 and pain level as 0/10. Patient describes vertigo as room spinning dizziness lasting less than a minute. Aggravating factors are getting up out of bed and head movements. Patient currently ambulating with no assistive device. Patient reports using a rolling walker at night when going to the bathroom. Past Medical History/Comorbidities: Ms. Dru Hills  has a past medical history of Diabetes ([facility] [address] and Hypertension. Ms. Roger Lynch  has a past surgical history that includes hx other surgical (07/08/2019). Social History/Living Environment:  
  Lives with son in a one story home. Patient has no steps to enter her home. Prior Level of Function/Work/Activity: 
Independent. Retired. Dominant Side:  
      RIGHT Personal Factors:   
      Sex:  female Age:  81 y.o. Ambulatory/Rehab Services H2 Model Falls Risk Assessment Risk Factors: 
     (1)  Dizziness/Vertigo 
     (5)  History of Recent Falls [w/in 3 months] Ability to Rise from Chair: 
     (1)  Pushes up, successful in one attempt Falls Prevention Plan: 
     Exercise/Equipment Adaptation (specify):  Patient will be supervised at all times due to higher fall risk Total: (5 or greater = High Risk): 7  
©2010 [facility] [place] . [facility] Patent #[id number]. Federal Law prohibits the replication, distribution or use without written permission from [facility] of [employer] Current Medications:   
  
Current Outpatient Medications:  
  ofloxacin (FLOXIN) 0.3 % ophthalmic solution, Apply 2 Drops to eye four (4) times daily. Indications: otitis media, Disp: 10 mL, Rfl: 1 
  magnesium oxide (MAG-OX) 400 mg tablet, Take 400 mg by mouth., Disp: , Rfl:  
  acetaminophen (TYLENOL) 325 mg tablet, Take 325 mg by mouth., Disp: , Rfl:  
  amLODIPine (NORVASC) 5 mg tablet, TAKE 1 TABLET (5 MG) BY MOUTH NIGHTLY, Disp: , Rfl: 3 
  glucose blood VI test strips (ASCENSIA AUTODISC VI, ONE TOUCH ULTRA TEST VI) strip, by Not Applicable route., Disp: , Rfl:  
  ONETOUCH ULTRA BLUE TEST STRIP strip, USE TO CHECK BLOOD SUGAR AS DIRECTED, Disp: , Rfl: 3   Blood-Glucose Meter monitoring kit, Use as instructed. Meter of patient's choice. , Disp: , Rfl:  
  doxazosin (CARDURA) 4 mg tablet, TAKE 1 TAB (4 MG TOTAL) BY MOUTH EVERY EVENING, Disp: , Rfl: 0 
  hydroCHLOROthiazide (HYDRODIURIL) 25 mg tablet, TAKE 1 TAB (25 MG TOTAL) BY MOUTH EVERY MORNING, Disp: , Rfl: 0 
  imatinib (GLEEVEC) 400 mg tablet, Take 400 mg by mouth., Disp: , Rfl:  
  lactulose (CHRONULAC) 20 gram/30 mL soln solution, Take 10 g by mouth., Disp: , Rfl:  
  ONETOUCH DELICA LANCETS 30 gauge misc, USE TO CHECK BLOOD SUGAR DAILY, Disp: , Rfl: 3 
  megestrol (MEGACE) 40 mg tablet, TAKE 1 TABLET BY MOUTH EVERY DAY, Disp: , Rfl: 1 
  metFORMIN (GLUCOPHAGE) 500 mg tablet, Take 500 mg by mouth., Disp: , Rfl:  
  senna-docusate (PERICOLACE) 8.6-50 mg per tablet, Take 2 Tabs by mouth., Disp: , Rfl:  
  levothyroxine (SYNTHROID) 100 mcg tablet, Take  by mouth Daily (before breakfast). , Disp: , Rfl:  
  lisinopril (PRINIVIL, ZESTRIL) 10 mg tablet, Take  by mouth daily. , Disp: , Rfl:  
  meclizine (ANTIVERT) 25 mg tablet, Take  by mouth three (3) times daily as needed. , Disp: , Rfl:   
  
Number of Personal Factors/Comorbidities that affect the Plan of Care: 1-2: MODERATE COMPLEXITY EXAMINATION:  
Observation/Orthostatic Postural Assessment: Forward head/rounded shoulders posture. Functional Mobility:  
      Gait/Ambulation:  Patient ambulates without assistive device demonstrating shuffling gait pattern. Patient has foot drop on right side due to peripheral neuropathy. Talked to patient about AFO. Patient and her sons did not seem interested in AFO. Strength:   
      Bilateral lower extremity hip/knee strength 5/5. Ankle strength is as follows:  Dorsiflexion right 1/5 and left 3+/5 and plantarflexion 4/5. Sensation:  
      Decreased bilateral feet. Postural Control & Balance: · Reich Balance Scale:  Not tested. · Dynamic Gait Index:  21/24.   (A score less than or equal to19/24 is abnormal and predictive of falls). Score improved from 14/24 since initial evaluation.   
 
Position Tests: 
· Hallpike-Pamela testing presented as positive when head is turned to the left indicating that Benign Paroxysmal Positional Vertigo (BPPV) is present on the left. Body Structures Involved: 1. Eyes and Ears 2. Muscles Body Functions Affected: 1. Neuromusculoskeletal Activities and Participation Affected: 1. General Tasks and Demands 2. Mobility 3. Interpersonal Interactions and Relationships 4. Community, Social and [place] Number of elements (examined above) that affect the Plan of Care: 3: MODERATE COMPLEXITY CLINICAL PRESENTATION:  
Presentation: Evolving clinical presentation with changing clinical characteristics: MODERATE COMPLEXITY CLINICAL DECISION MAKING:  
Use of outcome tool(s) and clinical judgement create a POC that gives a: Questionable prediction of patient's progress: MODERATE COMPLEXITY

## 2021-02-07 ENCOUNTER — APPOINTMENT (OUTPATIENT)
Dept: CT IMAGING | Age: 86
End: 2021-02-07
Attending: EMERGENCY MEDICINE
Payer: MEDICARE

## 2021-02-07 ENCOUNTER — HOSPITAL ENCOUNTER (EMERGENCY)
Age: 86
Discharge: HOME | End: 2021-02-07
Attending: EMERGENCY MEDICINE
Payer: MEDICARE

## 2021-02-07 VITALS
SYSTOLIC BLOOD PRESSURE: 189 MMHG | OXYGEN SATURATION: 99 % | DIASTOLIC BLOOD PRESSURE: 84 MMHG | BODY MASS INDEX: 22.47 KG/M2 | RESPIRATION RATE: 16 BRPM | WEIGHT: 135 LBS | HEART RATE: 60 BPM | TEMPERATURE: 98.4 F

## 2021-02-07 DIAGNOSIS — R42 DIZZINESS: Primary | ICD-10-CM

## 2021-02-07 LAB
ALBUMIN SERPL-MCNC: 3.7 G/DL (ref 3.2–4.6)
ALBUMIN/GLOB SERPL: 1 {RATIO} (ref 1.2–3.5)
ALP SERPL-CCNC: 140 U/L (ref 50–130)
ALT SERPL-CCNC: 21 U/L (ref 12–65)
ANION GAP SERPL CALC-SCNC: 3 MMOL/L (ref 7–16)
AST SERPL-CCNC: 23 U/L (ref 15–37)
ATRIAL RATE: 60 BPM
BASOPHILS # BLD: 0.1 K/UL (ref 0–0.2)
BASOPHILS NFR BLD: 1 % (ref 0–2)
BILIRUB SERPL-MCNC: 0.8 MG/DL (ref 0.2–1.1)
BUN SERPL-MCNC: 15 MG/DL (ref 8–23)
CALCIUM SERPL-MCNC: 9.3 MG/DL (ref 8.3–10.4)
CALCULATED P AXIS, ECG09: -111 DEGREES
CALCULATED T AXIS, ECG11: 38 DEGREES
CHLORIDE SERPL-SCNC: 110 MMOL/L (ref 98–107)
CO2 SERPL-SCNC: 27 MMOL/L (ref 21–32)
CREAT SERPL-MCNC: 0.99 MG/DL (ref 0.6–1)
DIAGNOSIS, 93000: NORMAL
DIFFERENTIAL METHOD BLD: NORMAL
EOSINOPHIL # BLD: 0.1 K/UL (ref 0–0.8)
EOSINOPHIL NFR BLD: 2 % (ref 0.5–7.8)
ERYTHROCYTE [DISTWIDTH] IN BLOOD BY AUTOMATED COUNT: 12.9 % (ref 11.9–14.6)
GLOBULIN SER CALC-MCNC: 3.6 G/DL (ref 2.3–3.5)
GLUCOSE SERPL-MCNC: 176 MG/DL (ref 65–100)
HCT VFR BLD AUTO: 37.3 % (ref 35.8–46.3)
HGB BLD-MCNC: 11.8 G/DL (ref 11.7–15.4)
IMM GRANULOCYTES # BLD AUTO: 0 K/UL (ref 0–0.5)
IMM GRANULOCYTES NFR BLD AUTO: 1 % (ref 0–5)
LYMPHOCYTES # BLD: 1.4 K/UL (ref 0.5–4.6)
LYMPHOCYTES NFR BLD: 22 % (ref 13–44)
MCH RBC QN AUTO: 28.6 PG (ref 26.1–32.9)
MCHC RBC AUTO-ENTMCNC: 31.6 G/DL (ref 31.4–35)
MCV RBC AUTO: 90.5 FL (ref 79.6–97.8)
MONOCYTES # BLD: 0.4 K/UL (ref 0.1–1.3)
MONOCYTES NFR BLD: 6 % (ref 4–12)
NEUTS SEG # BLD: 4.4 K/UL (ref 1.7–8.2)
NEUTS SEG NFR BLD: 69 % (ref 43–78)
NRBC # BLD: 0 K/UL (ref 0–0.2)
P-R INTERVAL, ECG05: 192 MS
PLATELET # BLD AUTO: 191 K/UL (ref 150–450)
PMV BLD AUTO: 10.6 FL (ref 9.4–12.3)
POTASSIUM SERPL-SCNC: 4.5 MMOL/L (ref 3.5–5.1)
PROT SERPL-MCNC: 7.3 G/DL (ref 6.3–8.2)
Q-T INTERVAL, ECG07: 414 MS
QRS DURATION, ECG06: 72 MS
QTC CALCULATION (BEZET), ECG08: 414 MS
RBC # BLD AUTO: 4.12 M/UL (ref 4.05–5.2)
SODIUM SERPL-SCNC: 140 MMOL/L (ref 136–145)
VENTRICULAR RATE, ECG03: 60 BPM
WBC # BLD AUTO: 6.4 K/UL (ref 4.3–11.1)

## 2021-02-07 PROCEDURE — 85025 COMPLETE CBC W/AUTO DIFF WBC: CPT

## 2021-02-07 PROCEDURE — 99284 EMERGENCY DEPT VISIT MOD MDM: CPT

## 2021-02-07 PROCEDURE — 93005 ELECTROCARDIOGRAM TRACING: CPT

## 2021-02-07 PROCEDURE — 94762 N-INVAS EAR/PLS OXIMTRY CONT: CPT

## 2021-02-07 PROCEDURE — 70450 CT HEAD/BRAIN W/O DYE: CPT

## 2021-02-07 PROCEDURE — 80053 COMPREHEN METABOLIC PANEL: CPT

## 2021-02-07 RX ORDER — ATORVASTATIN CALCIUM 80 MG/1
80 TABLET, FILM COATED ORAL
COMMUNITY
Start: 2020-08-21

## 2021-02-07 NOTE — ED NOTES
I have reviewed discharge instructions with the patient. The patient verbalized understanding. Patient left ED via Discharge Method: stretcher to Home with EMS transport      Opportunity for questions and clarification provided. Patient given 0 scripts. To continue your aftercare when you leave the hospital, you may receive an automated call from our care team to check in on how you are doing. This is a free service and part of our promise to provide the best care and service to meet your aftercare needs.  If you have questions, or wish to unsubscribe from this service please call [phone]. Thank you for Choosing our [facility] Emergency Department.

## 2021-02-07 NOTE — DISCHARGE INSTRUCTIONS
Continue with your current medications and follow-up with your doctor. Return to the emergency department for any new or worsening symptoms.

## 2021-02-07 NOTE — ED PROVIDER NOTES
Patient is an 28-year-old female with history of hypertension, diabetes, prior pacemaker placement who comes to the emergency department today via EMS from home. Patient states yesterday she had a brief onset of dizziness. This morning she went to get up out of bed sat on the edge of the bed and felt an odd sensation rush over her and was very dizzy. She had no headache. There was no trauma or injury. No chest pain or palpitations. The episode was brief and resolved. She feels fine now. She has had vertigo in the past.  She has not taken Antivert in a long time. The history is provided by the patient. Dizziness  This is a new problem. The current episode started yesterday. The problem has been resolved. There was no focality noted. Pertinent negatives include no focal weakness, no loss of sensation, no loss of balance, no slurred speech, no speech difficulty and no visual change. There has been no fever. Pertinent negatives include no shortness of breath, no chest pain, no vomiting, no headaches, no choking, no nausea and no bladder incontinence. There were no medications administered prior to arrival. Associated medical issues do not include trauma, bleeding disorder, seizures or CVA. 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 Dr. Maty Anguiano         History reviewed. No pertinent family history.     Social History     Socioeconomic History    Marital status:      Spouse name: Not on file    Number of children: Not on file    Years of education: Not on file    Highest education level: Not on file   Occupational History    Not on file   Social Needs    Financial resource strain: Not on file    Food insecurity     Worry: Not on file     Inability: Not on file    Transportation needs     Medical: Not on file     Non-medical: Not on file   Tobacco Use    Smoking status: Never Smoker    Smokeless tobacco: Never Used   Substance and Sexual Activity    Alcohol use: Not Currently    Drug use: Never    Sexual activity: Not on file   Lifestyle    Physical activity     Days per week: Not on file     Minutes per session: Not on file    Stress: Not on file   Relationships    Social connections     Talks on phone: Not on file     Gets together: Not on file     Attends [employer] service: Not on file     Active member of club or organization: Not on file     Attends meetings of clubs or organizations: Not on file     Relationship status: Not on file    Intimate partner violence     Fear of current or ex partner: Not on file     Emotionally abused: Not on file     Physically abused: Not on file     Forced sexual activity: Not on file   Other Topics Concern    Not on file   Social History Narrative    Not on file         ALLERGIES: Patient has no known allergies. Review of Systems   Constitutional: Negative for chills, fatigue and fever. HENT: Negative for congestion, rhinorrhea and sore throat. Eyes: Negative for pain, discharge and visual disturbance. Respiratory: Negative for cough, choking and shortness of breath. Cardiovascular: Negative for chest pain and palpitations. Gastrointestinal: Negative for abdominal pain, diarrhea, nausea and vomiting. Endocrine: Negative for polydipsia and polyuria. Genitourinary: Negative for bladder incontinence, dysuria, frequency and urgency. Musculoskeletal: Negative for back pain and neck pain. Skin: Negative for rash. Neurological: Positive for dizziness. Negative for tremors, focal weakness, seizures, syncope, facial asymmetry, speech difficulty, weakness, numbness, headaches and loss of balance. Hematological: Negative.         Vitals:    02/07/21 1016   BP: (!) 189/84   Pulse: 60   Resp: 16   Temp: 98.4 °F (36.9 °C)   SpO2: 99%   Weight: 61.2 kg (135 lb)            Physical Exam  Vitals signs and nursing note reviewed. Constitutional:       Appearance: She is well-developed. HENT:      Head: Normocephalic and atraumatic. Nose: Nose normal.      Mouth/Throat:      Mouth: Mucous membranes are dry. Pharynx: Oropharynx is clear. Eyes:      Conjunctiva/sclera: Conjunctivae normal.      Pupils: Pupils are equal, round, and reactive to light. Neck:      Musculoskeletal: Normal range of motion and neck supple. No muscular tenderness. Cardiovascular:      Rate and Rhythm: Normal rate and regular rhythm. Pulmonary:      Effort: Pulmonary effort is normal.      Breath sounds: Normal breath sounds. Abdominal:      Palpations: Abdomen is soft. Tenderness: There is no abdominal tenderness. There is no guarding or rebound. Musculoskeletal: Normal range of motion. General: No deformity. Lymphadenopathy:      Cervical: No cervical adenopathy. Skin:     General: Skin is warm and dry. Capillary Refill: Capillary refill takes less than 2 seconds. Findings: No rash. Neurological:      General: No focal deficit present. Mental Status: She is alert and oriented to person, place, and time. GCS: GCS eye subscore is 4. GCS verbal subscore is 5. GCS motor subscore is 6. Cranial Nerves: Cranial nerves are intact. No cranial nerve deficit, dysarthria or facial asymmetry. Sensory: Sensation is intact. No sensory deficit. Motor: Motor function is intact. No weakness, tremor, seizure activity or pronator drift. Coordination: Coordination is intact. Finger-Nose-Finger Test and Heel to [facility] Test normal.          MDM  Number of Diagnoses or Management Options  Diagnosis management comments: I wore appropriate PPE throughout this patient's ED visit. Maria M Schmitz MD, 10:39 AM    EKG reviewed by me shows a paced rhythm at a rate of 60. No acute ischemia.     11:54 AM  CAT scan of the head is negative per the radiologist  Basic labs unremarkable    Patient feels absolutely fine now has had no further dizziness and a normal neurologic exam.  She wants to go home. Voice dictation software was used during the making of this note. This software is not perfect and grammatical and other typographical errors may be present. This note has been proofread, but may still contain errors.   Rudy Guidry MD; 2/7/2021 @11:54 AM   ===================================================================         Amount and/or Complexity of Data Reviewed  Clinical lab tests: ordered and reviewed  Tests in the radiology section of CPT®: ordered and reviewed  Tests in the medicine section of CPT®: ordered and reviewed  Independent visualization of images, tracings, or specimens: yes    Risk of Complications, Morbidity, and/or Mortality  Presenting problems: moderate  Diagnostic procedures: low  Management options: low    Patient Progress  Patient progress: stable         Procedures

## 2021-02-07 NOTE — ED TRIAGE NOTES
Patient comes via ems from home co dizziness progressively worse for 2 weeks. Patient states dizziness worse when she stands or sits up, pt hx vertigo.

## 2021-04-27 ENCOUNTER — APPOINTMENT (OUTPATIENT)
Dept: GENERAL RADIOLOGY | Age: 86
End: 2021-04-27
Attending: EMERGENCY MEDICINE
Payer: MEDICARE

## 2021-04-27 ENCOUNTER — HOSPITAL ENCOUNTER (EMERGENCY)
Age: 86
Discharge: HOME | End: 2021-04-27
Attending: EMERGENCY MEDICINE
Payer: MEDICARE

## 2021-04-27 ENCOUNTER — APPOINTMENT (OUTPATIENT)
Dept: CT IMAGING | Age: 86
End: 2021-04-27
Attending: EMERGENCY MEDICINE
Payer: MEDICARE

## 2021-04-27 VITALS
HEIGHT: 65 IN | RESPIRATION RATE: 26 BRPM | BODY MASS INDEX: 24.16 KG/M2 | HEART RATE: 60 BPM | DIASTOLIC BLOOD PRESSURE: 75 MMHG | WEIGHT: 145 LBS | OXYGEN SATURATION: 98 % | TEMPERATURE: 99 F | SYSTOLIC BLOOD PRESSURE: 166 MMHG

## 2021-04-27 DIAGNOSIS — R42 VERTIGO: Primary | ICD-10-CM

## 2021-04-27 DIAGNOSIS — H93.11 TINNITUS OF RIGHT EAR: ICD-10-CM

## 2021-04-27 LAB
ALBUMIN SERPL-MCNC: 4 G/DL (ref 3.2–4.6)
ALBUMIN/GLOB SERPL: 0.9 {RATIO} (ref 1.2–3.5)
ALP SERPL-CCNC: 144 U/L (ref 50–130)
ALT SERPL-CCNC: 26 U/L (ref 12–65)
ANION GAP SERPL CALC-SCNC: 9 MMOL/L (ref 7–16)
AST SERPL-CCNC: 20 U/L (ref 15–37)
BACTERIA URNS QL MICRO: 0 /HPF
BASOPHILS # BLD: 0 K/UL (ref 0–0.2)
BASOPHILS NFR BLD: 1 % (ref 0–2)
BILIRUB SERPL-MCNC: 0.7 MG/DL (ref 0.2–1.1)
BUN SERPL-MCNC: 22 MG/DL (ref 8–23)
CALCIUM SERPL-MCNC: 9.4 MG/DL (ref 8.3–10.4)
CASTS URNS QL MICRO: 0 /LPF
CHLORIDE SERPL-SCNC: 105 MMOL/L (ref 98–107)
CO2 SERPL-SCNC: 28 MMOL/L (ref 21–32)
CREAT SERPL-MCNC: 1.18 MG/DL (ref 0.6–1)
DIFFERENTIAL METHOD BLD: NORMAL
EOSINOPHIL # BLD: 0.2 K/UL (ref 0–0.8)
EOSINOPHIL NFR BLD: 3 % (ref 0.5–7.8)
EPI CELLS #/AREA URNS HPF: 0 /HPF
ERYTHROCYTE [DISTWIDTH] IN BLOOD BY AUTOMATED COUNT: 13.8 % (ref 11.9–14.6)
GLOBULIN SER CALC-MCNC: 4.3 G/DL (ref 2.3–3.5)
GLUCOSE BLD STRIP.AUTO-MCNC: 211 MG/DL (ref 65–100)
GLUCOSE SERPL-MCNC: 225 MG/DL (ref 65–100)
HCT VFR BLD AUTO: 38.4 % (ref 35.8–46.3)
HGB BLD-MCNC: 12.1 G/DL (ref 11.7–15.4)
IMM GRANULOCYTES # BLD AUTO: 0 K/UL (ref 0–0.5)
IMM GRANULOCYTES NFR BLD AUTO: 0 % (ref 0–5)
LACTATE SERPL-SCNC: 1.8 MMOL/L (ref 0.4–2)
LYMPHOCYTES # BLD: 2 K/UL (ref 0.5–4.6)
LYMPHOCYTES NFR BLD: 28 % (ref 13–44)
MCH RBC QN AUTO: 28.1 PG (ref 26.1–32.9)
MCHC RBC AUTO-ENTMCNC: 31.5 G/DL (ref 31.4–35)
MCV RBC AUTO: 89.1 FL (ref 79.6–97.8)
MONOCYTES # BLD: 0.5 K/UL (ref 0.1–1.3)
MONOCYTES NFR BLD: 7 % (ref 4–12)
NEUTS SEG # BLD: 4.6 K/UL (ref 1.7–8.2)
NEUTS SEG NFR BLD: 63 % (ref 43–78)
NRBC # BLD: 0 K/UL (ref 0–0.2)
PLATELET # BLD AUTO: 214 K/UL (ref 150–450)
PMV BLD AUTO: 10.4 FL (ref 9.4–12.3)
POTASSIUM SERPL-SCNC: 3.9 MMOL/L (ref 3.5–5.1)
PROT SERPL-MCNC: 8.3 G/DL (ref 6.3–8.2)
RBC # BLD AUTO: 4.31 M/UL (ref 4.05–5.2)
RBC #/AREA URNS HPF: NORMAL /HPF
SERVICE CMNT-IMP: ABNORMAL
SODIUM SERPL-SCNC: 142 MMOL/L (ref 136–145)
WBC # BLD AUTO: 7.3 K/UL (ref 4.3–11.1)
WBC URNS QL MICRO: NORMAL /HPF

## 2021-04-27 PROCEDURE — 74011250636 HC RX REV CODE- 250/636: Performed by: EMERGENCY MEDICINE

## 2021-04-27 PROCEDURE — 81015 MICROSCOPIC EXAM OF URINE: CPT

## 2021-04-27 PROCEDURE — 96374 THER/PROPH/DIAG INJ IV PUSH: CPT

## 2021-04-27 PROCEDURE — 80053 COMPREHEN METABOLIC PANEL: CPT

## 2021-04-27 PROCEDURE — 81003 URINALYSIS AUTO W/O SCOPE: CPT

## 2021-04-27 PROCEDURE — 82962 GLUCOSE BLOOD TEST: CPT

## 2021-04-27 PROCEDURE — 99285 EMERGENCY DEPT VISIT HI MDM: CPT

## 2021-04-27 PROCEDURE — 85025 COMPLETE CBC W/AUTO DIFF WBC: CPT

## 2021-04-27 PROCEDURE — 93005 ELECTROCARDIOGRAM TRACING: CPT | Performed by: EMERGENCY MEDICINE

## 2021-04-27 PROCEDURE — 71045 X-RAY EXAM CHEST 1 VIEW: CPT

## 2021-04-27 PROCEDURE — 83605 ASSAY OF LACTIC ACID: CPT

## 2021-04-27 PROCEDURE — 70450 CT HEAD/BRAIN W/O DYE: CPT

## 2021-04-27 RX ORDER — KETOROLAC TROMETHAMINE 30 MG/ML
15 INJECTION, SOLUTION INTRAMUSCULAR; INTRAVENOUS
Status: COMPLETED | OUTPATIENT
Start: 2021-04-27 | End: 2021-04-27

## 2021-04-27 RX ADMIN — KETOROLAC TROMETHAMINE 15 MG: 30 INJECTION, SOLUTION INTRAMUSCULAR; INTRAVENOUS at 22:42

## 2021-04-27 RX ADMIN — SODIUM CHLORIDE 1000 ML: 900 INJECTION, SOLUTION INTRAVENOUS at 22:42

## 2021-04-28 LAB
ATRIAL RATE: 63 BPM
CALCULATED P AXIS, ECG09: 53 DEGREES
CALCULATED R AXIS, ECG10: -12 DEGREES
CALCULATED T AXIS, ECG11: 20 DEGREES
DIAGNOSIS, 93000: NORMAL
P-R INTERVAL, ECG05: 142 MS
Q-T INTERVAL, ECG07: 410 MS
QRS DURATION, ECG06: 84 MS
QTC CALCULATION (BEZET), ECG08: 419 MS
VENTRICULAR RATE, ECG03: 63 BPM

## 2021-04-28 NOTE — ED NOTES
I have reviewed discharge instructions with the patient. The patient verbalized understanding. Patient left ED via Discharge Method: wheelchair to Home with  son). Opportunity for questions and clarification provided. Patient given 0 scripts. To continue your aftercare when you leave the hospital, you may receive an automated call from our care team to check in on how you are doing. This is a free service and part of our promise to provide the best care and service to meet your aftercare needs.  If you have questions, or wish to unsubscribe from this service please call [phone]. Thank you for Choosing our [facility] Emergency Department.

## 2021-04-28 NOTE — ED NOTES
Pt presents to the ED for c/o dizziness and a left temporal headache for several days.   States that she has had vertigo for several weeks and just hasn't felt well for several days

## 2021-04-28 NOTE — ED NOTES
Patient refused for her blood pressure to be taken and also for her care to be complete. Patient say that her son need to be at work and she want to go. MD was notify of the situation.

## 2021-04-28 NOTE — ED PROVIDER NOTES
81-year-old black female with history of dizziness and hypertension into the emergency department complaining of headache similar though she has had in the past today and worsening dizziness over the last 2 weeks. Patient has had chronic problems with vertigo in the past, was at one point seeing physical therapy for the same. She states that 1 time they did put her on some meclizine but that did not help. She describes the headache is bilateral frontal and throbbing, but is taken nothing for it. She complains of a constant ringing in her ear which seems to be worse at night, making it hard for her to sleep. The symptoms are primarily in her right ear. Patient has been seen by ENT for the same, and they were the ones who sent her to physical therapy for her vertigo. The history is provided by the patient and a relative. Headache   This is a recurrent problem. The current episode started more than 2 days ago. The problem occurs constantly. The problem has not changed since onset. The headache is aggravated by nothing. The pain is located in the bilateral and frontal region. The quality of the pain is described as throbbing. The pain is at a severity of 10/10. Associated symptoms include dizziness. Pertinent negatives include no anorexia, no fever, no malaise/fatigue, no chest pressure, no near-syncope, no orthopnea, no palpitations, no syncope, no shortness of breath, no weakness, no tingling, no visual change, no nausea and no vomiting. She has tried nothing for the symptoms. The treatment provided no relief. Dizziness  This is a chronic problem. The current episode started more than 1 week ago. The problem has not changed since onset. There was no focality noted.  Pertinent negatives include no focal weakness, no loss of sensation, no loss of balance, no slurred speech, no speech difficulty, no memory loss, no movement disorder, no agitation, no visual change, no auditory change, no mental status change, no unresponsiveness and no disorientation. There has been no fever. Associated symptoms include headaches. Pertinent negatives include no shortness of breath, no chest pain, no vomiting, no altered mental status, no confusion, no choking, no nausea, no bowel incontinence and no bladder incontinence. There were no medications administered prior to arrival. Associated medical issues do not include trauma, mood changes, bleeding disorder, seizures, dementia, CVA or clotting disorder. 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 Dr. Oswald Gonzalez         History reviewed. No pertinent family history.     Social History     Socioeconomic History    Marital status:      Spouse name: Not on file    Number of children: Not on file    Years of education: Not on file    Highest education level: Not on file   Occupational History    Not on file   Social Needs    Financial resource strain: Not on file    Food insecurity     Worry: Not on file     Inability: Not on file    Transportation needs     Medical: Not on file     Non-medical: Not on file   Tobacco Use    Smoking status: Never Smoker    Smokeless tobacco: Never Used   Substance and Sexual Activity    Alcohol use: Not Currently    Drug use: Never    Sexual activity: Not on file   Lifestyle    Physical activity     Days per week: Not on file     Minutes per session: Not on file    Stress: Not on file   Relationships    Social connections     Talks on phone: Not on file     Gets together: Not on file     Attends Buddhism service: Not on file     Active member of club or organization: Not on file     Attends meetings of clubs or organizations: Not on file     Relationship status: Not on file    Intimate partner violence     Fear of current or ex partner: Not on file     Emotionally abused: Not on file     Physically abused: Not on file     Forced sexual activity: Not on file   Other Topics Concern    Not on file   Social History Narrative    Not on file         ALLERGIES: Patient has no known allergies. Review of Systems   Constitutional: Negative for chills, fever and malaise/fatigue. HENT: Positive for tinnitus. Negative for congestion, ear discharge, ear pain and facial swelling. Respiratory: Negative for choking and shortness of breath. Cardiovascular: Negative for chest pain, palpitations, orthopnea, syncope and near-syncope. Gastrointestinal: Negative for abdominal pain, anorexia, bowel incontinence, nausea and vomiting. Genitourinary: Negative for bladder incontinence. Neurological: Positive for dizziness and headaches. Negative for tingling, focal weakness, seizures, facial asymmetry, speech difficulty, weakness, light-headedness and loss of balance. Psychiatric/Behavioral: Negative for agitation, confusion and memory loss. All other systems reviewed and are negative. Vitals:    04/27/21 2052   BP: (!) 207/84   Pulse: 66   Resp: 18   Temp: 99 °F (37.2 °C)   SpO2: 98%   Weight: 65.8 kg (145 lb)   Height: 5' 5\" (1.651 m)            Physical Exam  Vitals signs and nursing note reviewed. Constitutional:       General: She is not in acute distress. Appearance: She is well-developed. HENT:      Head: Normocephalic and atraumatic. Right Ear: External ear normal.      Left Ear: External ear normal.   Eyes:      Extraocular Movements: Extraocular movements intact. Conjunctiva/sclera: Conjunctivae normal.      Pupils: Pupils are equal, round, and reactive to light. Neck:      Musculoskeletal: Normal range of motion and neck supple. Cardiovascular:      Rate and Rhythm: Normal rate and regular rhythm. Pulses: Normal pulses. Heart sounds: Normal heart sounds. No murmur.    Pulmonary:      Effort: Pulmonary effort is normal.      Breath sounds: Normal breath sounds. Abdominal:      General: Bowel sounds are normal.      Palpations: Abdomen is soft. Tenderness: There is no abdominal tenderness. Musculoskeletal: Normal range of motion. Skin:     General: Skin is warm and dry. Capillary Refill: Capillary refill takes less than 2 seconds. Neurological:      Mental Status: She is alert and oriented to person, place, and time. Cranial Nerves: Cranial nerves are intact. Sensory: Sensation is intact. Motor: Motor function is intact. No tremor or pronator drift. Coordination: Coordination is intact.    Psychiatric:         Mood and Affect: Mood normal.         Speech: Speech normal.         Behavior: Behavior normal.         Cognition and Memory: Cognition and memory normal.          MDM  Number of Diagnoses or Management Options  Tinnitus of right ear: new and requires workup  Vertigo: new and requires workup     Amount and/or Complexity of Data Reviewed  Clinical lab tests: ordered and reviewed  Tests in the radiology section of CPT®: ordered and reviewed  Obtain history from someone other than the patient: yes  Review and summarize past medical records: yes (Excerpt from discharge summary last August:  Fauzia Grimaldo MD - 2020 2:18 PM EDT  Formatting of this note is different from the original.    Hospitalist Discharge Summary    Division of [address]  Phone: [phone] Fax: [phone]  Date/Time: 20 - 2:18 PM   Admitted: 2020   Discharged: 20   Total LOS: LOS: 0 days   Admitting MD: Shelton Cheatham MD   Discharging MD: Florin Colbert MD       Demographics   Patient: Carolin Northern : 1935 MRN: [id number]     PCP: Roxanne Leonard NP   PCP Phone: [phone]   Code Status: Full Code   Readmit Risk: LACE+ Score: 65 Diet: Diet Diabetic 60g (1120-5733 kcal)       Follow Up   Future Appointments   Date Time Provider Attila Jiang   8/28/2020 1:00 PM Christina Rowe MD [facility] ONC None   9/10/2020 1:30 PM Raghu Bland MD [facility] EP CC - EP     Pending studies:       Discharge Diagnoses   Active Problems:  Postoperative hypothyroidism  Type 2 diabetes mellitus without complication ([facility] [address]  Essential (primary) hypertension  Other hyperlipidemia  Dizziness  Bradycardia      Hospital Course   Presenting Problem:   Chief Complaint   Patient presents with    Dizziness       History of Present Illness: patient doing well, denies any dizziness or syncope since arrival    Hospital Course:     1. Dizziness- patient with no orthostasis on exam ( less than 20 drop), had bradycardia. Cardiology stressed her and she had a less than optimal response. They wanted to place a PPM but she declined. They are recommending a 30 day event monitor with close follow up     They also change her bp meds because it was felt the cardura/hctz could contribute to her dizziness as well    Consults: cardiology  Procedures: none       Subsequent to that visit, the patient was given a pacemaker by cardiology.    )    Risk of Complications, Morbidity, and/or Mortality  Presenting problems: moderate  Diagnostic procedures: moderate  Management options: moderate    Patient Progress  Patient progress: improved         Procedures    The patient was observed in the ED. patient feeling better at time of discharge, although very aggravated at her wait time and insisting to leave.     Results Reviewed:      Recent Results (from the past 24 hour(s))   CBC WITH AUTOMATED DIFF    Collection Time: 04/27/21  9:05 PM   Result Value Ref Range    WBC 7.3 4.3 - 11.1 K/uL    RBC 4.31 4.05 - 5.2 M/uL    HGB 12.1 11.7 - 15.4 g/dL    HCT 38.4 35.8 - 46.3 %    MCV 89.1 79.6 - 97.8 FL    MCH 28.1 26.1 - 32.9 PG    MCHC 31.5 31.4 - 35.0 g/dL    RDW 13.8 11.9 - 14.6 %    PLATELET 653 515 - 719 K/uL    MPV 10.4 9.4 - 12.3 FL    ABSOLUTE NRBC 0.00 0.0 - 0.2 K/uL    DF AUTOMATED      NEUTROPHILS 63 43 - 78 %    LYMPHOCYTES 28 13 - 44 %    MONOCYTES 7 4.0 - 12.0 %    EOSINOPHILS 3 0.5 - 7.8 %    BASOPHILS 1 0.0 - 2.0 %    IMMATURE GRANULOCYTES 0 0.0 - 5.0 %    ABS. NEUTROPHILS 4.6 1.7 - 8.2 K/UL    ABS. LYMPHOCYTES 2.0 0.5 - 4.6 K/UL    ABS. MONOCYTES 0.5 0.1 - 1.3 K/UL    ABS. EOSINOPHILS 0.2 0.0 - 0.8 K/UL    ABS. BASOPHILS 0.0 0.0 - 0.2 K/UL    ABS. IMM. GRANS. 0.0 0.0 - 0.5 K/UL   METABOLIC PANEL, COMPREHENSIVE    Collection Time: 04/27/21  9:05 PM   Result Value Ref Range    Sodium 142 136 - 145 mmol/L    Potassium 3.9 3.5 - 5.1 mmol/L    Chloride 105 98 - 107 mmol/L    CO2 28 21 - 32 mmol/L    Anion gap 9 7 - 16 mmol/L    Glucose 225 (H) 65 - 100 mg/dL    BUN 22 8 - 23 MG/DL    Creatinine 1.18 (H) 0.6 - 1.0 MG/DL    GFR est AA 56 (L) >60 ml/min/1.73m2    GFR est non-AA 46 (L) >60 ml/min/1.73m2    Calcium 9.4 8.3 - 10.4 MG/DL    Bilirubin, total 0.7 0.2 - 1.1 MG/DL    ALT (SGPT) 26 12 - 65 U/L    AST (SGOT) 20 15 - 37 U/L    Alk.  phosphatase 144 (H) 50 - 130 U/L    Protein, total 8.3 (H) 6.3 - 8.2 g/dL    Albumin 4.0 3.2 - 4.6 g/dL    Globulin 4.3 (H) 2.3 - 3.5 g/dL    A-G Ratio 0.9 (L) 1.2 - 3.5     LACTIC ACID    Collection Time: 04/27/21  9:05 PM   Result Value Ref Range    Lactic acid 1.8 0.4 - 2.0 MMOL/L   GLUCOSE, POC    Collection Time: 04/27/21  9:06 PM   Result Value Ref Range    Glucose (POC) 211 (H) 65 - 100 mg/dL    Performed by Neri    EKG, 12 LEAD, INITIAL    Collection Time: 04/27/21  9:11 PM   Result Value Ref Range    Ventricular Rate 63 BPM    Atrial Rate 63 BPM    P-R Interval 142 ms    QRS Duration 84 ms    Q-T Interval 410 ms    QTC Calculation (Bezet) 419 ms    Calculated P Axis 53 degrees    Calculated R Axis -12 degrees    Calculated T Axis 20 degrees    Diagnosis       Normal sinus rhythm  Moderate voltage criteria for LVH, may be normal variant  Borderline ECG  When compared with ECG of 27-APR-2021 21:01,  No significant change was found     URINE MICROSCOPIC    Collection Time: 04/27/21 10:07 PM   Result Value Ref Range    WBC 3-5 0 /hpf    RBC 0-3 0 /hpf    Epithelial cells 0 0 /hpf    Bacteria 0 0 /hpf    Casts 0 0 /lpf     CT HEAD WO CONT   Final Result   1. No CT evidence of acute intracranial process. XR CHEST PORT   Final Result   No evidence of an acute intrathoracic process. I discussed the results of all labs, procedures, radiographs, and treatments with the patient and available family. Treatment plan is agreed upon and the patient is ready for discharge. All voiced understanding of the discharge plan and medication instructions or changes as appropriate. Questions about treatment in the ED were answered. All were encouraged to return should symptoms worsen or new problems develop.

## 2022-07-13 ENCOUNTER — HOSPITAL ENCOUNTER (EMERGENCY)
Dept: GENERAL RADIOLOGY | Age: 87
Discharge: HOME | End: 2022-07-16
Payer: MEDICARE

## 2022-07-13 ENCOUNTER — HOSPITAL ENCOUNTER (EMERGENCY)
Age: 87
Discharge: HOME | End: 2022-07-13
Attending: EMERGENCY MEDICINE
Payer: MEDICARE

## 2022-07-13 VITALS
TEMPERATURE: 99.1 F | HEART RATE: 62 BPM | SYSTOLIC BLOOD PRESSURE: 144 MMHG | WEIGHT: 137 LBS | OXYGEN SATURATION: 95 % | DIASTOLIC BLOOD PRESSURE: 67 MMHG | BODY MASS INDEX: 22.82 KG/M2 | HEIGHT: 65 IN | RESPIRATION RATE: 18 BRPM

## 2022-07-13 DIAGNOSIS — U07.1 COVID-19: Primary | ICD-10-CM

## 2022-07-13 LAB
ALBUMIN SERPL-MCNC: 3.8 G/DL (ref 3.2–4.6)
ALBUMIN/GLOB SERPL: 1 {RATIO} (ref 1.2–3.5)
ALP SERPL-CCNC: 94 U/L (ref 50–130)
ALT SERPL-CCNC: 16 U/L (ref 12–65)
ANION GAP SERPL CALC-SCNC: 6 MMOL/L (ref 7–16)
APPEARANCE UR: CLEAR
AST SERPL-CCNC: 26 U/L (ref 15–37)
BACTERIA URNS QL MICRO: NEGATIVE /HPF
BASOPHILS # BLD: 0 K/UL (ref 0–0.2)
BASOPHILS NFR BLD: 0 % (ref 0–2)
BILIRUB SERPL-MCNC: 1.2 MG/DL (ref 0.2–1.1)
BILIRUB UR QL: NEGATIVE
BUN SERPL-MCNC: 15 MG/DL (ref 8–23)
CALCIUM SERPL-MCNC: 9.1 MG/DL (ref 8.3–10.4)
CASTS URNS QL MICRO: ABNORMAL /LPF
CHLORIDE SERPL-SCNC: 107 MMOL/L (ref 98–107)
CK SERPL-CCNC: 114 U/L (ref 21–215)
CO2 SERPL-SCNC: 27 MMOL/L (ref 21–32)
COLOR UR: ABNORMAL
CREAT SERPL-MCNC: 1.22 MG/DL (ref 0.6–1)
DIFFERENTIAL METHOD BLD: ABNORMAL
EKG ATRIAL RATE: 63 BPM
EKG DIAGNOSIS: NORMAL
EKG P AXIS: 51 DEGREES
EKG P-R INTERVAL: 136 MS
EKG Q-T INTERVAL: 398 MS
EKG QRS DURATION: 78 MS
EKG QTC CALCULATION (BAZETT): 407 MS
EKG R AXIS: -28 DEGREES
EKG T AXIS: 24 DEGREES
EKG VENTRICULAR RATE: 63 BPM
EOSINOPHIL # BLD: 0 K/UL (ref 0–0.8)
EOSINOPHIL NFR BLD: 0 % (ref 0.5–7.8)
EPI CELLS #/AREA URNS HPF: ABNORMAL /HPF
ERYTHROCYTE [DISTWIDTH] IN BLOOD BY AUTOMATED COUNT: 13.6 % (ref 11.9–14.6)
GLOBULIN SER CALC-MCNC: 3.7 G/DL (ref 2.3–3.5)
GLUCOSE SERPL-MCNC: 147 MG/DL (ref 65–100)
GLUCOSE UR STRIP.AUTO-MCNC: NEGATIVE MG/DL
HCT VFR BLD AUTO: 33 % (ref 35.8–46.3)
HGB BLD-MCNC: 10.6 G/DL (ref 11.7–15.4)
HGB UR QL STRIP: NEGATIVE
IMM GRANULOCYTES # BLD AUTO: 0 K/UL (ref 0–0.5)
IMM GRANULOCYTES NFR BLD AUTO: 1 % (ref 0–5)
KETONES UR QL STRIP.AUTO: ABNORMAL MG/DL
LACTATE SERPL-SCNC: 1.3 MMOL/L (ref 0.5–2)
LEUKOCYTE ESTERASE UR QL STRIP.AUTO: NEGATIVE
LYMPHOCYTES # BLD: 0.4 K/UL (ref 0.5–4.6)
LYMPHOCYTES NFR BLD: 6 % (ref 13–44)
MAGNESIUM SERPL-MCNC: 1.8 MG/DL (ref 1.8–2.4)
MCH RBC QN AUTO: 28.9 PG (ref 26.1–32.9)
MCHC RBC AUTO-ENTMCNC: 32.1 G/DL (ref 31.4–35)
MCV RBC AUTO: 89.9 FL (ref 79.6–97.8)
MONOCYTES # BLD: 0.6 K/UL (ref 0.1–1.3)
MONOCYTES NFR BLD: 9 % (ref 4–12)
NEUTS SEG # BLD: 5.4 K/UL (ref 1.7–8.2)
NEUTS SEG NFR BLD: 85 % (ref 43–78)
NITRITE UR QL STRIP.AUTO: NEGATIVE
NRBC # BLD: 0 K/UL (ref 0–0.2)
PH UR STRIP: 6 [PH] (ref 5–9)
PLATELET # BLD AUTO: 160 K/UL (ref 150–450)
PMV BLD AUTO: 10.1 FL (ref 9.4–12.3)
POTASSIUM SERPL-SCNC: 4 MMOL/L (ref 3.5–5.1)
PROCALCITONIN SERPL-MCNC: <0.05 NG/ML (ref 0–0.49)
PROT SERPL-MCNC: 7.5 G/DL (ref 6.3–8.2)
PROT UR STRIP-MCNC: 100 MG/DL
RBC # BLD AUTO: 3.67 M/UL (ref 4.05–5.2)
RBC #/AREA URNS HPF: ABNORMAL /HPF
SARS-COV-2 RDRP RESP QL NAA+PROBE: DETECTED
SODIUM SERPL-SCNC: 140 MMOL/L (ref 136–145)
SOURCE: ABNORMAL
SP GR UR REFRACTOMETRY: 1.02 (ref 1–1.02)
UROBILINOGEN UR QL STRIP.AUTO: 1 EU/DL (ref 0.2–1)
WBC # BLD AUTO: 6.4 K/UL (ref 4.3–11.1)
WBC URNS QL MICRO: ABNORMAL /HPF

## 2022-07-13 PROCEDURE — 99285 EMERGENCY DEPT VISIT HI MDM: CPT

## 2022-07-13 PROCEDURE — 87086 URINE CULTURE/COLONY COUNT: CPT

## 2022-07-13 PROCEDURE — 2580000003 HC RX 258: Performed by: EMERGENCY MEDICINE

## 2022-07-13 PROCEDURE — 84145 PROCALCITONIN (PCT): CPT

## 2022-07-13 PROCEDURE — 85025 COMPLETE CBC W/AUTO DIFF WBC: CPT

## 2022-07-13 PROCEDURE — 93005 ELECTROCARDIOGRAM TRACING: CPT | Performed by: EMERGENCY MEDICINE

## 2022-07-13 PROCEDURE — 82550 ASSAY OF CK (CPK): CPT

## 2022-07-13 PROCEDURE — 83735 ASSAY OF MAGNESIUM: CPT

## 2022-07-13 PROCEDURE — 80053 COMPREHEN METABOLIC PANEL: CPT

## 2022-07-13 PROCEDURE — 87635 SARS-COV-2 COVID-19 AMP PRB: CPT

## 2022-07-13 PROCEDURE — 81001 URINALYSIS AUTO W/SCOPE: CPT

## 2022-07-13 PROCEDURE — 71045 X-RAY EXAM CHEST 1 VIEW: CPT

## 2022-07-13 PROCEDURE — 6370000000 HC RX 637 (ALT 250 FOR IP): Performed by: EMERGENCY MEDICINE

## 2022-07-13 PROCEDURE — 87040 BLOOD CULTURE FOR BACTERIA: CPT

## 2022-07-13 PROCEDURE — 83605 ASSAY OF LACTIC ACID: CPT

## 2022-07-13 RX ORDER — 0.9 % SODIUM CHLORIDE 0.9 %
1000 INTRAVENOUS SOLUTION INTRAVENOUS
Status: COMPLETED | OUTPATIENT
Start: 2022-07-13 | End: 2022-07-13

## 2022-07-13 RX ORDER — ACETAMINOPHEN 500 MG
1000 TABLET ORAL
Status: COMPLETED | OUTPATIENT
Start: 2022-07-13 | End: 2022-07-13

## 2022-07-13 RX ADMIN — ACETAMINOPHEN 1000 MG: 500 TABLET, FILM COATED ORAL at 10:09

## 2022-07-13 RX ADMIN — SODIUM CHLORIDE 1000 ML: 9 INJECTION, SOLUTION INTRAVENOUS at 12:03

## 2022-07-13 ASSESSMENT — ENCOUNTER SYMPTOMS
SHORTNESS OF BREATH: 0
VOMITING: 0
EYE DISCHARGE: 0
COLOR CHANGE: 0
COUGH: 0
ABDOMINAL PAIN: 0
EYE REDNESS: 0
BACK PAIN: 1
RHINORRHEA: 0
NAUSEA: 0
FACIAL SWELLING: 0

## 2022-07-13 ASSESSMENT — PAIN - FUNCTIONAL ASSESSMENT: PAIN_FUNCTIONAL_ASSESSMENT: NONE - DENIES PAIN

## 2022-07-13 NOTE — ED NOTES
I have reviewed discharge instructions with the patient. The patient verbalized understanding. Patient left ED via Discharge Method: stretcher to Home with [facility]    Opportunity for questions and clarification provided. Patient given 0 scripts. To continue your aftercare when you leave the hospital, you may receive an automated call from our care team to check in on how you are doing. This is a free service and part of our promise to provide the best care and service to meet your aftercare needs.  If you have questions, or wish to unsubscribe from this service please call [phone]. Thank you for Choosing our [facility] Emergency Department.         Carlos A Main RN  07/13/22 0868

## 2022-07-13 NOTE — ED NOTES
Pt assisted to restroom and back on stretcher. Waiting for further orders.       Nabila Montoya RN  07/13/22 8591

## 2022-07-13 NOTE — ED PROVIDER NOTES
[facility] Emergency Department Provider Note                   PCP:                DEANDRA Diaz NP               Age: 81 y.o. Sex: female       ICD-10-CM    1. COVID-19  U5.3        DISPOSITION Decision To Discharge 07/13/2022 01:11:31 PM       New Prescriptions    No medications on file       Orders Placed This Encounter   Procedures    Blood Culture 1    Blood Culture 2    Culture, Urine    COVID-19, Rapid    XR CHEST PORTABLE    CBC with Auto Differential    Lactate, Sepsis    Procalcitonin    Urinalysis    CK    Magnesium    Comprehensive Metabolic Panel    Neurologic Status Assessment    Vital Signs Per Unit Routine    Straight cath    EKG 12 Lead    Saline lock IV         Jimbo Valdez is a 81 y.o. female who presents to the Emergency Department with chief complaint of    Chief Complaint   Patient presents with    Fall    Fever      Chief complaint : Fever, fatigue, malaise    HISTORY OF PRESENT ILLNESS :  Location : Generalized    Quality : Patient fell sometime overnight and was found on the floor. Patient denies any pain or injuries    Quantity : Constant    Timing : Yesterday    Severity : Mild to moderate    Context : Lives in her own apartment with her son    Alleviating / exacerbating factors : None    Associated Symptoms : Fevers, generalized aches            Review of Systems   Constitutional: Positive for activity change, fatigue and fever. Negative for chills. HENT: Negative for facial swelling and rhinorrhea. Eyes: Negative for discharge and redness. Respiratory: Negative for cough and shortness of breath. Cardiovascular: Negative for chest pain and palpitations. Gastrointestinal: Negative for abdominal pain, nausea and vomiting. Endocrine: Negative for polydipsia and polyuria. Genitourinary: Negative for difficulty urinating and dysuria. Musculoskeletal: Positive for arthralgias, back pain and myalgias.    Skin: Negative for color change and pallor. Neurological: Negative for dizziness and headaches. All other systems reviewed and are negative. All other systems reviewed and are negative. Past Medical History:   Diagnosis Date    Diabetes mellitus ([address]     Hypertension         Past Surgical History:   Procedure Laterality Date    APPENDECTOMY      CHOLECYSTECTOMY          No family history on file. Social Connections:     Frequency of Communication with Friends and Family: Not on file    Frequency of Social Gatherings with Friends and Family: Not on file    Attends [employer] Services: Not on file    Active Member of Clubs or Organizations: Not on file    Attends Club or Organization Meetings: Not on file    Marital Status: Not on file        No Known Allergies     Vitals signs and nursing note reviewed. Patient Vitals for the past 4 hrs:   Temp Pulse Resp BP SpO2   07/13/22 1145 99.8 °F (37.7 °C) 60 18 (!) 144/65 95 %   07/13/22 1106 (!) 100.5 °F (38.1 °C) 60 18 (!) 154/74 92 %   07/13/22 1030 -- 60 18 (!) 158/72 96 %   07/13/22 1000 -- 62 18 (!) 165/75 94 %   07/13/22 0930 (!) 101.1 °F (38.4 °C) 64 20 (!) 177/79 91 %          Physical Exam  Vitals and nursing note reviewed. Constitutional:       General: She is not in acute distress. Appearance: Normal appearance. She is normal weight. She is not ill-appearing, toxic-appearing or diaphoretic. Comments: Febrile   HENT:      Head: Normocephalic and atraumatic. Right Ear: External ear normal.      Left Ear: External ear normal.      Nose: Nose normal. No congestion or rhinorrhea. Mouth/Throat:      Mouth: Mucous membranes are moist.      Pharynx: Oropharynx is clear. Eyes:      General: No scleral icterus. Right eye: No discharge. Left eye: No discharge. Extraocular Movements: Extraocular movements intact. Conjunctiva/sclera: Conjunctivae normal.   Cardiovascular:      Rate and Rhythm: Normal rate and regular rhythm. Pulmonary:      Effort: Pulmonary effort is normal. No respiratory distress. Breath sounds: Normal breath sounds. Abdominal:      General: Abdomen is flat. Palpations: Abdomen is soft. There is no fluid wave or pulsatile mass. Tenderness: There is no abdominal tenderness. There is no guarding or rebound. Musculoskeletal:         General: No deformity or signs of injury. Normal range of motion. Cervical back: Normal range of motion and neck supple. No rigidity. Skin:     General: Skin is warm and dry. Coloration: Skin is not jaundiced or pale. Neurological:      General: No focal deficit present. Mental Status: She is alert and oriented to person, place, and time. Psychiatric:         Mood and Affect: Mood normal.         Behavior: Behavior normal.         Thought Content: Thought content normal.          MDM  Number of Diagnoses or Management Options  COVID-19: new, needed workup  Diagnosis management comments: Fever, fatigue, malaise, work-up to include chest x-ray, cath UA, and blood work are all negative, patient does have COVID-19 as of her nasal swabbing today in the ER.   Sats are preserved at 97%       Amount and/or Complexity of Data Reviewed  Clinical lab tests: reviewed and ordered (Results Include:    Recent Results (from the past 24 hour(s))  -EKG 12 Lead:   Collection Time: 07/13/22  9:29 AM       Result                      Value             Ref Range           Ventricular Rate            63                BPM                 Atrial Rate                 63                BPM                 P-R Interval                136               ms                  QRS Duration                78                ms                  Q-T Interval                398               ms                  QTc Calculation (Bazet*     407               ms                  P Axis                      51                degrees             R Axis                      -28 degrees             T Axis                      24                degrees             Diagnosis                                                     Normal sinus rhythm  -Blood Culture 1:   Collection Time: 07/13/22  9:59 AM  Specimen: Blood       Result                      Value             Ref Range           Special Requests                                              RIGHT Antecubital        Culture                     PENDING                          -Procalcitonin:   Collection Time: 07/13/22  9:59 AM       Result                      Value             Ref Range           Procalcitonin               <0.05             0.00 - 0.49 *  -CK:   Collection Time: 07/13/22  9:59 AM       Result                      Value             Ref Range           Total CK                    114               21 - 215 U/L   -Magnesium:   Collection Time: 07/13/22  9:59 AM       Result                      Value             Ref Range           Magnesium                   1.8               1.8 - 2.4 mg*  -Comprehensive Metabolic Panel:   Collection Time: 07/13/22  9:59 AM       Result                      Value             Ref Range           Sodium                      140               136 - 145 mm*       Potassium                   4.0               3.5 - 5.1 mm*       Chloride                    107               98 - 107 mmo*       CO2                         27                21 - 32 mmol*       Anion Gap                   6 (L)             7 - 16 mmol/L       Glucose                     147 (H)           65 - 100 mg/*       BUN                         15                8 - 23 MG/DL        CREATININE                  1.22 (H)          0.6 - 1.0 MG*       GFR         54 (L)            >60 ml/min/1*       GFR Non- Americ*     44 (L)            >60 ml/min/1*       Calcium                     9.1               8.3 - 10.4 M*       Total Bilirubin             1.2 (H)           0.2 - 1.1 MG*       ALT 16                12 - 65 U/L         AST                         26                15 - 37 U/L         Alk Phosphatase             94                50 - 130 U/L        Total Protein               7.5               6.3 - 8.2 g/*       Albumin                     3.8               3.2 - 4.6 g/*       Globulin                    3.7 (H)           2.3 - 3.5 g/*       Albumin/Globulin Ratio      1.0 (L)           1.2 - 3.5      -Blood Culture 2:   Collection Time: 07/13/22 10:02 AM  Specimen: Blood       Result                      Value             Ref Range           Special Requests            LEFT HAND                             Culture                     PENDING                          -CBC with Auto Differential:   Collection Time: 07/13/22 10:02 AM       Result                      Value             Ref Range           WBC                         6.4               4.3 - 11.1 K*       RBC                         3.67 (L)          4.05 - 5.2 M*       Hemoglobin                  10.6 (L)          11.7 - 15.4 *       Hematocrit                  33.0 (L)          35.8 - 46.3 %       MCV                         89.9              79.6 - 97.8 *       MCH                         28.9              26.1 - 32.9 *       MCHC                        32.1              31.4 - 35.0 *       RDW                         13.6              11.9 - 14.6 %       Platelets                   160               150 - 450 K/*       MPV                         10.1              9.4 - 12.3 FL       nRBC                        0.00              0.0 - 0.2 K/*       Differential Type           AUTOMATED                             Seg Neutrophils             85 (H)            43 - 78 %           Lymphocytes                 6 (L)             13 - 44 %           Monocytes                   9                 4.0 - 12.0 %        Eosinophils %               0 (L)             0.5 - 7.8 %         Basophils                   0 0.0 - 2.0 %         Immature Granulocytes       1                 0.0 - 5.0 %         Segs Absolute               5.4               1.7 - 8.2 K/*       Absolute Lymph #            0.4 (L)           0.5 - 4.6 K/*       Absolute Mono #             0.6               0.1 - 1.3 K/*       Absolute Eos #              0.0               0.0 - 0.8 K/*       Basophils Absolute          0.0               0.0 - 0.2 K/*       Absolute Immature Gran*     0.0               0.0 - 0.5 K/*  -Lactate, Sepsis:   Collection Time: 07/13/22 10:02 AM       Result                      Value             Ref Range           Lactic Acid, Sepsis         1.3               0.5 - 2.0 MM*  -Urinalysis:   Collection Time: 07/13/22 10:02 AM       Result                      Value             Ref Range           Color, UA                   YELLOW/STRAW                          Appearance                  CLEAR                                 Specific [place], UA        1.019             1.001 - 1.02*       pH, Urine                   6.0               5.0 - 9.0           Protein, UA                 100 (A)           NEG mg/dL           Glucose, UA                 Negative          mg/dL               Ketones, Urine              TRACE (A)         NEG mg/dL           Bilirubin Urine             Negative          NEG                 Blood, Urine                Negative          NEG                 Urobilinogen, Urine         1.0               0.2 - 1.0 EU*       Nitrite, Urine              Negative          NEG                 Leukocyte Esterase, Ur*     Negative          NEG                 WBC, UA                     0-4               0 /hpf              RBC, UA                     0-5               0 /hpf              Epithelial Cells UA         0-5               0 /hpf              BACTERIA, URINE             Negative          0 /hpf              Casts                       0-2               0 /lpf         -COVID-19, Rapid:   Collection Time: 07/13/22 12:07 PM  Specimen: Nasopharyngeal       Result                      Value             Ref Range           Source                                                        Nasopharyngeal       SARS-CoV-2, Rapid           Detected (AA)     NOTD           )  Tests in the radiology section of CPT®: reviewed and ordered (XR CHEST PORTABLE   Final Result - 1. No acute cardiopulmonary process.)  Decide to obtain previous medical records or to obtain history from someone other than the patient: yes    Risk of Complications, Morbidity, and/or Mortality  Presenting problems: moderate  Diagnostic procedures: low  Management options: low    Patient Progress  Patient progress: stable      Procedures    Labs Reviewed   COVID-19, RAPID - Abnormal; Notable for the following components:       Result Value    SARS-CoV-2, Rapid Detected (*)     All other components within normal limits   CBC WITH AUTO DIFFERENTIAL - Abnormal; Notable for the following components:    RBC 3.67 (*)     Hemoglobin 10.6 (*)     Hematocrit 33.0 (*)     Seg Neutrophils 85 (*)     Lymphocytes 6 (*)     Eosinophils % 0 (*)     Absolute Lymph # 0.4 (*)     All other components within normal limits   URINALYSIS - Abnormal; Notable for the following components:    Protein,  (*)     Ketones, Urine TRACE (*)     All other components within normal limits   COMPREHENSIVE METABOLIC PANEL - Abnormal; Notable for the following components:    Anion Gap 6 (*)     Glucose 147 (*)     CREATININE 1.22 (*)     GFR  54 (*)     GFR Non- 44 (*)     Total Bilirubin 1.2 (*)     Globulin 3.7 (*)     Albumin/Globulin Ratio 1.0 (*)     All other components within normal limits   CULTURE, BLOOD 1   CULTURE, BLOOD 1   CULTURE, URINE   LACTATE, SEPSIS   PROCALCITONIN   CK   MAGNESIUM   LACTATE, SEPSIS        XR CHEST PORTABLE   Final Result      1. No acute cardiopulmonary process.       CPT code(s) 76950                           [place] Coma Scale  Eye Opening: Spontaneous  Best Verbal Response: Confused  Best Motor Response: Obeys commands  Christian Coma Scale Score: 14                     Voice dictation software was used during the making of this note. This software is not perfect and grammatical and other typographical errors may be present. This note has not been completely proofread for errors.        Nhan Chen MD  07/13/22 1704

## 2022-07-13 NOTE — ED NOTES
Pt is unable to give address and phone number, pt lives with her son who she says is at work, pt doesn't know her medication, states she takes bp med, oral dm med and vertigo med, [facility] and [facility] have no records of her meds     Zoey Ruiz RN  07/13/22 2885

## 2022-07-14 ENCOUNTER — CARE COORDINATION (OUTPATIENT)
Dept: CARE COORDINATION | Facility: CLINIC | Age: 87
End: 2022-07-14

## 2022-07-14 NOTE — CARE COORDINATION
Date/Time:  7/14/2022 10:36 AM  Attempted to reach patient by telephone for COVID JUDITH. Call within 2 business days of discharge: Yes Unable to leave message. Will attempt to reach patient again.

## 2022-07-15 ENCOUNTER — CARE COORDINATION (OUTPATIENT)
Dept: CARE COORDINATION | Facility: CLINIC | Age: 87
End: 2022-07-15

## 2022-07-15 LAB
BACTERIA SPEC CULT: NORMAL
SERVICE CMNT-IMP: NORMAL

## 2022-07-15 NOTE — CARE COORDINATION
COVID CARE TRANSITIONS  Second unsuccessful attempt to reach patient by telephone. Call within 2 business days of discharge: Yes   No voicemail and unable to leave HIPPA compliant message requesting a return call. Will not attempt to reach patient again and the episode will be resolved.

## 2022-07-18 LAB
BACTERIA SPEC CULT: NORMAL
BACTERIA SPEC CULT: NORMAL
SERVICE CMNT-IMP: NORMAL
SERVICE CMNT-IMP: NORMAL

## 2022-10-03 ENCOUNTER — HOSPITAL ENCOUNTER (EMERGENCY)
Age: 87
End: 2022-10-03
Payer: MEDICARE

## 2022-10-03 ENCOUNTER — HOSPITAL ENCOUNTER (EMERGENCY)
Age: 87
Discharge: ANOTHER HOSPITAL | End: 2022-10-03
Attending: EMERGENCY MEDICINE
Payer: MEDICARE

## 2022-10-03 ENCOUNTER — HOSPITAL ENCOUNTER (INPATIENT)
Age: 87
LOS: 3 days | Discharge: NURSING, REHAB OR LONG-TERM CARE | DRG: 247 | End: 2022-10-06
Attending: INTERNAL MEDICINE | Admitting: INTERNAL MEDICINE
Payer: MEDICARE

## 2022-10-03 VITALS
RESPIRATION RATE: 19 BRPM | DIASTOLIC BLOOD PRESSURE: 50 MMHG | OXYGEN SATURATION: 96 % | HEART RATE: 60 BPM | SYSTOLIC BLOOD PRESSURE: 90 MMHG

## 2022-10-03 VITALS — BODY MASS INDEX: 22.78 KG/M2 | WEIGHT: 136.91 LBS

## 2022-10-03 DIAGNOSIS — I21.3 STEMI (ST ELEVATION MYOCARDIAL INFARCTION) (HCC): ICD-10-CM

## 2022-10-03 DIAGNOSIS — I21.3 STEMI (ST ELEVATION MYOCARDIAL INFARCTION) (HCC): Primary | ICD-10-CM

## 2022-10-03 DIAGNOSIS — I21.3 ST ELEVATION MYOCARDIAL INFARCTION (STEMI), UNSPECIFIED ARTERY (HCC): Primary | ICD-10-CM

## 2022-10-03 PROBLEM — I10 HTN (HYPERTENSION): Status: ACTIVE | Noted: 2022-10-03

## 2022-10-03 PROBLEM — E11.9 TYPE 2 DIABETES MELLITUS (HCC): Chronic | Status: ACTIVE | Noted: 2022-10-03

## 2022-10-03 PROBLEM — E03.9 HYPOTHYROIDISM: Chronic | Status: ACTIVE | Noted: 2022-10-03

## 2022-10-03 LAB
ACT BLD: 219 SECS (ref 70–128)
ALBUMIN SERPL-MCNC: 3.6 G/DL (ref 3.2–4.6)
ALBUMIN/GLOB SERPL: 0.9 {RATIO} (ref 1.2–3.5)
ALP SERPL-CCNC: 94 U/L (ref 50–130)
ALT SERPL-CCNC: 21 U/L (ref 12–65)
ANION GAP SERPL CALC-SCNC: 5 MMOL/L (ref 4–13)
AST SERPL-CCNC: 35 U/L (ref 15–37)
BILIRUB SERPL-MCNC: 1 MG/DL (ref 0.2–1.1)
BUN SERPL-MCNC: 16 MG/DL (ref 8–23)
CALCIUM SERPL-MCNC: 9.5 MG/DL (ref 8.3–10.4)
CHLORIDE SERPL-SCNC: 108 MMOL/L (ref 101–110)
CO2 SERPL-SCNC: 27 MMOL/L (ref 21–32)
CREAT SERPL-MCNC: 1.19 MG/DL (ref 0.6–1)
ERYTHROCYTE [DISTWIDTH] IN BLOOD BY AUTOMATED COUNT: 15.6 % (ref 11.9–14.6)
GLOBULIN SER CALC-MCNC: 4.2 G/DL (ref 2.3–3.5)
GLUCOSE SERPL-MCNC: 193 MG/DL (ref 65–100)
HCT VFR BLD AUTO: 38.8 % (ref 35.8–46.3)
HGB BLD-MCNC: 12.3 G/DL (ref 11.7–15.4)
MCH RBC QN AUTO: 28.9 PG (ref 26.1–32.9)
MCHC RBC AUTO-ENTMCNC: 31.7 G/DL (ref 31.4–35)
MCV RBC AUTO: 91.1 FL (ref 79.6–97.8)
NRBC # BLD: 0 K/UL (ref 0–0.2)
PLATELET # BLD AUTO: 216 K/UL (ref 150–450)
PMV BLD AUTO: 9.9 FL (ref 9.4–12.3)
POTASSIUM SERPL-SCNC: 3.6 MMOL/L (ref 3.5–5.1)
PROT SERPL-MCNC: 7.8 G/DL (ref 6.3–8.2)
RBC # BLD AUTO: 4.26 M/UL (ref 4.05–5.2)
SODIUM SERPL-SCNC: 140 MMOL/L (ref 136–145)
TROPONIN I SERPL HS-MCNC: 1723.1 PG/ML (ref 0–14)
WBC # BLD AUTO: 7.1 K/UL (ref 4.3–11.1)

## 2022-10-03 PROCEDURE — 6370000000 HC RX 637 (ALT 250 FOR IP): Performed by: INTERNAL MEDICINE

## 2022-10-03 PROCEDURE — 6360000002 HC RX W HCPCS: Performed by: INTERNAL MEDICINE

## 2022-10-03 PROCEDURE — B2111ZZ FLUOROSCOPY OF MULTIPLE CORONARY ARTERIES USING LOW OSMOLAR CONTRAST: ICD-10-PCS | Performed by: INTERNAL MEDICINE

## 2022-10-03 PROCEDURE — 99285 EMERGENCY DEPT VISIT HI MDM: CPT

## 2022-10-03 PROCEDURE — C1725 CATH, TRANSLUMIN NON-LASER: HCPCS | Performed by: INTERNAL MEDICINE

## 2022-10-03 PROCEDURE — C1894 INTRO/SHEATH, NON-LASER: HCPCS | Performed by: INTERNAL MEDICINE

## 2022-10-03 PROCEDURE — 99153 MOD SED SAME PHYS/QHP EA: CPT | Performed by: INTERNAL MEDICINE

## 2022-10-03 PROCEDURE — 85347 COAGULATION TIME ACTIVATED: CPT

## 2022-10-03 PROCEDURE — 6360000004 HC RX CONTRAST MEDICATION: Performed by: INTERNAL MEDICINE

## 2022-10-03 PROCEDURE — 027034Z DILATION OF CORONARY ARTERY, ONE ARTERY WITH DRUG-ELUTING INTRALUMINAL DEVICE, PERCUTANEOUS APPROACH: ICD-10-PCS | Performed by: INTERNAL MEDICINE

## 2022-10-03 PROCEDURE — 99152 MOD SED SAME PHYS/QHP 5/>YRS: CPT | Performed by: INTERNAL MEDICINE

## 2022-10-03 PROCEDURE — 4500000002 HC ER NO CHARGE

## 2022-10-03 PROCEDURE — C9606 PERC D-E COR REVASC W AMI S: HCPCS | Performed by: INTERNAL MEDICINE

## 2022-10-03 PROCEDURE — 2500000003 HC RX 250 WO HCPCS: Performed by: INTERNAL MEDICINE

## 2022-10-03 PROCEDURE — 85027 COMPLETE CBC AUTOMATED: CPT

## 2022-10-03 PROCEDURE — 93458 L HRT ARTERY/VENTRICLE ANGIO: CPT | Performed by: INTERNAL MEDICINE

## 2022-10-03 PROCEDURE — 6370000000 HC RX 637 (ALT 250 FOR IP)

## 2022-10-03 PROCEDURE — 2709999900 HC NON-CHARGEABLE SUPPLY: Performed by: INTERNAL MEDICINE

## 2022-10-03 PROCEDURE — 84484 ASSAY OF TROPONIN QUANT: CPT

## 2022-10-03 PROCEDURE — C9600 PERC DRUG-EL COR STENT SING: HCPCS | Performed by: INTERNAL MEDICINE

## 2022-10-03 PROCEDURE — C1874 STENT, COATED/COV W/DEL SYS: HCPCS | Performed by: INTERNAL MEDICINE

## 2022-10-03 PROCEDURE — 2000000000 HC ICU R&B

## 2022-10-03 PROCEDURE — 92941 PRQ TRLML REVSC TOT OCCL AMI: CPT | Performed by: INTERNAL MEDICINE

## 2022-10-03 PROCEDURE — 93458 L HRT ARTERY/VENTRICLE ANGIO: CPT

## 2022-10-03 PROCEDURE — C1887 CATHETER, GUIDING: HCPCS | Performed by: INTERNAL MEDICINE

## 2022-10-03 PROCEDURE — C1769 GUIDE WIRE: HCPCS | Performed by: INTERNAL MEDICINE

## 2022-10-03 PROCEDURE — 80053 COMPREHEN METABOLIC PANEL: CPT

## 2022-10-03 PROCEDURE — 99222 1ST HOSP IP/OBS MODERATE 55: CPT | Performed by: INTERNAL MEDICINE

## 2022-10-03 PROCEDURE — 4A023N7 MEASUREMENT OF CARDIAC SAMPLING AND PRESSURE, LEFT HEART, PERCUTANEOUS APPROACH: ICD-10-PCS | Performed by: INTERNAL MEDICINE

## 2022-10-03 DEVICE — STENT RONYX30022UX RESOLUTE ONYX 3.00X22
Type: IMPLANTABLE DEVICE | Site: HEART | Status: FUNCTIONAL
Brand: RESOLUTE ONYX™

## 2022-10-03 RX ORDER — PANTOPRAZOLE SODIUM 40 MG/1
40 TABLET, DELAYED RELEASE ORAL
Status: DISCONTINUED | OUTPATIENT
Start: 2022-10-04 | End: 2022-10-06 | Stop reason: HOSPADM

## 2022-10-03 RX ORDER — NITROGLYCERIN 20 MG/100ML
INJECTION INTRAVENOUS PRN
Status: DISCONTINUED | OUTPATIENT
Start: 2022-10-03 | End: 2022-10-03 | Stop reason: HOSPADM

## 2022-10-03 RX ORDER — ONDANSETRON 2 MG/ML
4 INJECTION INTRAMUSCULAR; INTRAVENOUS EVERY 4 HOURS PRN
Status: DISCONTINUED | OUTPATIENT
Start: 2022-10-03 | End: 2022-10-06 | Stop reason: HOSPADM

## 2022-10-03 RX ORDER — MAGNESIUM HYDROXIDE/ALUMINUM HYDROXICE/SIMETHICONE 120; 1200; 1200 MG/30ML; MG/30ML; MG/30ML
SUSPENSION ORAL PRN
Status: DISCONTINUED | OUTPATIENT
Start: 2022-10-03 | End: 2022-10-03 | Stop reason: HOSPADM

## 2022-10-03 RX ORDER — MEGESTROL ACETATE 40 MG/1
40 TABLET ORAL DAILY
Status: DISCONTINUED | OUTPATIENT
Start: 2022-10-04 | End: 2022-10-06 | Stop reason: HOSPADM

## 2022-10-03 RX ORDER — ASPIRIN 81 MG/1
324 TABLET, CHEWABLE ORAL DAILY
Status: DISCONTINUED | OUTPATIENT
Start: 2022-10-04 | End: 2022-10-03 | Stop reason: HOSPADM

## 2022-10-03 RX ORDER — INSULIN LISPRO 100 [IU]/ML
0-4 INJECTION, SOLUTION INTRAVENOUS; SUBCUTANEOUS NIGHTLY
Status: DISCONTINUED | OUTPATIENT
Start: 2022-10-03 | End: 2022-10-06 | Stop reason: HOSPADM

## 2022-10-03 RX ORDER — INSULIN LISPRO 100 [IU]/ML
0-4 INJECTION, SOLUTION INTRAVENOUS; SUBCUTANEOUS
Status: DISCONTINUED | OUTPATIENT
Start: 2022-10-04 | End: 2022-10-06 | Stop reason: HOSPADM

## 2022-10-03 RX ORDER — HEPARIN SODIUM 200 [USP'U]/100ML
INJECTION, SOLUTION INTRAVENOUS CONTINUOUS PRN
Status: COMPLETED | OUTPATIENT
Start: 2022-10-03 | End: 2022-10-03

## 2022-10-03 RX ORDER — ENOXAPARIN SODIUM 100 MG/ML
40 INJECTION SUBCUTANEOUS DAILY
Status: DISCONTINUED | OUTPATIENT
Start: 2022-10-03 | End: 2022-10-06 | Stop reason: HOSPADM

## 2022-10-03 RX ORDER — MAGNESIUM SULFATE IN WATER 40 MG/ML
2000 INJECTION, SOLUTION INTRAVENOUS PRN
Status: DISCONTINUED | OUTPATIENT
Start: 2022-10-03 | End: 2022-10-06 | Stop reason: HOSPADM

## 2022-10-03 RX ORDER — SODIUM CHLORIDE 9 MG/ML
INJECTION, SOLUTION INTRAVENOUS CONTINUOUS
Status: DISCONTINUED | OUTPATIENT
Start: 2022-10-04 | End: 2022-10-04

## 2022-10-03 RX ORDER — LEVOTHYROXINE SODIUM 0.1 MG/1
100 TABLET ORAL
Status: DISCONTINUED | OUTPATIENT
Start: 2022-10-04 | End: 2022-10-06 | Stop reason: HOSPADM

## 2022-10-03 RX ORDER — NITROGLYCERIN 0.4 MG/1
0.4 TABLET SUBLINGUAL EVERY 5 MIN PRN
Status: DISCONTINUED | OUTPATIENT
Start: 2022-10-03 | End: 2022-10-06 | Stop reason: HOSPADM

## 2022-10-03 RX ORDER — POTASSIUM CHLORIDE 20 MEQ/1
40 TABLET, EXTENDED RELEASE ORAL PRN
Status: DISCONTINUED | OUTPATIENT
Start: 2022-10-03 | End: 2022-10-06 | Stop reason: HOSPADM

## 2022-10-03 RX ORDER — LISINOPRIL 5 MG/1
10 TABLET ORAL DAILY
Status: DISCONTINUED | OUTPATIENT
Start: 2022-10-04 | End: 2022-10-04

## 2022-10-03 RX ORDER — HEPARIN SODIUM 1000 [USP'U]/ML
4000 INJECTION, SOLUTION INTRAVENOUS; SUBCUTANEOUS
Status: DISCONTINUED | OUTPATIENT
Start: 2022-10-03 | End: 2022-10-03 | Stop reason: HOSPADM

## 2022-10-03 RX ORDER — POLYETHYLENE GLYCOL 3350 17 G/17G
17 POWDER, FOR SOLUTION ORAL DAILY PRN
Status: DISCONTINUED | OUTPATIENT
Start: 2022-10-03 | End: 2022-10-06 | Stop reason: HOSPADM

## 2022-10-03 RX ORDER — CLOPIDOGREL BISULFATE 75 MG/1
TABLET ORAL PRN
Status: DISCONTINUED | OUTPATIENT
Start: 2022-10-03 | End: 2022-10-03 | Stop reason: HOSPADM

## 2022-10-03 RX ORDER — HYDROCODONE BITARTRATE AND ACETAMINOPHEN 5; 325 MG/1; MG/1
1 TABLET ORAL EVERY 6 HOURS PRN
Status: DISCONTINUED | OUTPATIENT
Start: 2022-10-03 | End: 2022-10-06 | Stop reason: HOSPADM

## 2022-10-03 RX ORDER — CARVEDILOL 3.12 MG/1
3.12 TABLET ORAL 2 TIMES DAILY WITH MEALS
Status: DISCONTINUED | OUTPATIENT
Start: 2022-10-04 | End: 2022-10-04

## 2022-10-03 RX ORDER — SODIUM CHLORIDE 0.9 % (FLUSH) 0.9 %
5-40 SYRINGE (ML) INJECTION EVERY 12 HOURS SCHEDULED
Status: DISCONTINUED | OUTPATIENT
Start: 2022-10-04 | End: 2022-10-06 | Stop reason: HOSPADM

## 2022-10-03 RX ORDER — CLOPIDOGREL BISULFATE 75 MG/1
75 TABLET ORAL DAILY
Status: DISCONTINUED | OUTPATIENT
Start: 2022-10-04 | End: 2022-10-06 | Stop reason: HOSPADM

## 2022-10-03 RX ORDER — MORPHINE SULFATE 2 MG/ML
2 INJECTION, SOLUTION INTRAMUSCULAR; INTRAVENOUS EVERY 4 HOURS PRN
Status: DISCONTINUED | OUTPATIENT
Start: 2022-10-03 | End: 2022-10-06 | Stop reason: HOSPADM

## 2022-10-03 RX ORDER — ATORVASTATIN CALCIUM 80 MG/1
80 TABLET, FILM COATED ORAL NIGHTLY
Status: DISCONTINUED | OUTPATIENT
Start: 2022-10-04 | End: 2022-10-06 | Stop reason: HOSPADM

## 2022-10-03 RX ORDER — POTASSIUM CHLORIDE 7.45 MG/ML
10 INJECTION INTRAVENOUS PRN
Status: DISCONTINUED | OUTPATIENT
Start: 2022-10-03 | End: 2022-10-06 | Stop reason: HOSPADM

## 2022-10-03 RX ORDER — ACETAMINOPHEN 650 MG/1
650 SUPPOSITORY RECTAL EVERY 6 HOURS PRN
Status: DISCONTINUED | OUTPATIENT
Start: 2022-10-03 | End: 2022-10-06 | Stop reason: HOSPADM

## 2022-10-03 RX ORDER — ACETAMINOPHEN 325 MG/1
650 TABLET ORAL EVERY 6 HOURS PRN
Status: DISCONTINUED | OUTPATIENT
Start: 2022-10-03 | End: 2022-10-06 | Stop reason: HOSPADM

## 2022-10-03 RX ORDER — SODIUM CHLORIDE 0.9 % (FLUSH) 0.9 %
5-40 SYRINGE (ML) INJECTION PRN
Status: DISCONTINUED | OUTPATIENT
Start: 2022-10-03 | End: 2022-10-06 | Stop reason: HOSPADM

## 2022-10-03 RX ORDER — SENNA AND DOCUSATE SODIUM 50; 8.6 MG/1; MG/1
1 TABLET, FILM COATED ORAL NIGHTLY
Status: DISCONTINUED | OUTPATIENT
Start: 2022-10-04 | End: 2022-10-06 | Stop reason: HOSPADM

## 2022-10-03 RX ORDER — ASPIRIN 81 MG/1
81 TABLET, CHEWABLE ORAL DAILY
Status: DISCONTINUED | OUTPATIENT
Start: 2022-10-04 | End: 2022-10-06 | Stop reason: HOSPADM

## 2022-10-03 RX ORDER — ASPIRIN 81 MG/1
TABLET, CHEWABLE ORAL
Status: COMPLETED
Start: 2022-10-03 | End: 2022-10-03

## 2022-10-03 RX ORDER — LIDOCAINE HYDROCHLORIDE 10 MG/ML
INJECTION, SOLUTION INFILTRATION; PERINEURAL PRN
Status: DISCONTINUED | OUTPATIENT
Start: 2022-10-03 | End: 2022-10-03 | Stop reason: HOSPADM

## 2022-10-03 RX ORDER — HEPARIN SODIUM 1000 [USP'U]/ML
4000 INJECTION, SOLUTION INTRAVENOUS; SUBCUTANEOUS
Status: DISCONTINUED | OUTPATIENT
Start: 2022-10-03 | End: 2022-10-03

## 2022-10-03 RX ORDER — LANOLIN ALCOHOL/MO/W.PET/CERES
400 CREAM (GRAM) TOPICAL DAILY
Status: DISCONTINUED | OUTPATIENT
Start: 2022-10-04 | End: 2022-10-06 | Stop reason: HOSPADM

## 2022-10-03 RX ADMIN — ASPIRIN 324 MG: 81 TABLET, CHEWABLE ORAL at 21:53

## 2022-10-03 ASSESSMENT — ENCOUNTER SYMPTOMS
ALLERGIC/IMMUNOLOGIC NEGATIVE: 1
RESPIRATORY NEGATIVE: 1
EYES NEGATIVE: 1
GASTROINTESTINAL NEGATIVE: 1
SHORTNESS OF BREATH: 1

## 2022-10-03 ASSESSMENT — PAIN SCALES - GENERAL
PAINLEVEL_OUTOF10: 0
PAINLEVEL_OUTOF10: 0

## 2022-10-03 NOTE — Clinical Note
Patient transported with a Registered Nurse. Patient transported to: 3108.    STEMI Dr Farhan jimenez  Stent x1/POBA RCA  RRA - 12 ml air in band @ 2315  Heparin 9000 units total  Plavix 600 mg PO  Mylanta 30 ml PO

## 2022-10-04 ENCOUNTER — APPOINTMENT (OUTPATIENT)
Dept: NON INVASIVE DIAGNOSTICS | Age: 87
DRG: 247 | End: 2022-10-04
Payer: MEDICARE

## 2022-10-04 LAB
ANION GAP SERPL CALC-SCNC: 8 MMOL/L (ref 4–13)
BUN SERPL-MCNC: 13 MG/DL (ref 8–23)
CALCIUM SERPL-MCNC: 8.8 MG/DL (ref 8.3–10.4)
CHLORIDE SERPL-SCNC: 108 MMOL/L (ref 101–110)
CHOLEST SERPL-MCNC: 274 MG/DL
CO2 SERPL-SCNC: 26 MMOL/L (ref 21–32)
CREAT SERPL-MCNC: 0.9 MG/DL (ref 0.6–1)
ECHO AO ASC DIAM: 3.1 CM
ECHO AO ROOT DIAM: 3.4 CM
ECHO AV AREA PEAK VELOCITY: 2.6 CM2
ECHO AV AREA VTI: 2.7 CM2
ECHO AV MEAN GRADIENT: 3 MMHG
ECHO AV MEAN VELOCITY: 0.8 M/S
ECHO AV PEAK GRADIENT: 5 MMHG
ECHO AV PEAK VELOCITY: 1.1 M/S
ECHO AV VELOCITY RATIO: 0.82
ECHO AV VTI: 23.5 CM
ECHO EST RA PRESSURE: 3 MMHG
ECHO IVC PROX: 1.3 CM
ECHO LA AREA 2C: 16.6 CM2
ECHO LA AREA 4C: 16.2 CM2
ECHO LA DIAMETER: 2.7 CM
ECHO LA MAJOR AXIS: 5.2 CM
ECHO LA MINOR AXIS: 5.5 CM
ECHO LA TO AORTIC ROOT RATIO: 0.79
ECHO LA VOL BP: 41 ML (ref 22–52)
ECHO LV E' LATERAL VELOCITY: 7 CM/S
ECHO LV E' SEPTAL VELOCITY: 4 CM/S
ECHO LV EDV A2C: 77 ML
ECHO LV EDV A4C: 83 ML
ECHO LV EJECTION FRACTION A2C: 49 %
ECHO LV EJECTION FRACTION A4C: 50 %
ECHO LV EJECTION FRACTION BIPLANE: 49 % (ref 55–100)
ECHO LV ESV A2C: 40 ML
ECHO LV ESV A4C: 41 ML
ECHO LV FRACTIONAL SHORTENING: 29 % (ref 28–44)
ECHO LV INTERNAL DIMENSION DIASTOLIC: 3.8 CM (ref 3.9–5.3)
ECHO LV INTERNAL DIMENSION SYSTOLIC: 2.7 CM
ECHO LV IVSD: 1.3 CM (ref 0.6–0.9)
ECHO LV MASS 2D: 153.2 G (ref 67–162)
ECHO LV POSTERIOR WALL DIASTOLIC: 1.1 CM (ref 0.6–0.9)
ECHO LV RELATIVE WALL THICKNESS RATIO: 0.58
ECHO LVOT AREA: 3.1 CM2
ECHO LVOT AV VTI INDEX: 0.85
ECHO LVOT DIAM: 2 CM
ECHO LVOT MEAN GRADIENT: 2 MMHG
ECHO LVOT PEAK GRADIENT: 3 MMHG
ECHO LVOT PEAK VELOCITY: 0.9 M/S
ECHO LVOT SV: 62.5 ML
ECHO LVOT VTI: 19.9 CM
ECHO MV A VELOCITY: 1.02 M/S
ECHO MV E DECELERATION TIME (DT): 238 MS
ECHO MV E VELOCITY: 0.56 M/S
ECHO MV E/A RATIO: 0.55
ECHO MV E/E' LATERAL: 8
ECHO MV E/E' RATIO (AVERAGED): 11
ECHO MV E/E' SEPTAL: 14
ECHO RIGHT VENTRICULAR SYSTOLIC PRESSURE (RVSP): 23 MMHG
ECHO RV BASAL DIMENSION: 2.8 CM
ECHO RV TAPSE: 1.7 CM (ref 1.7–?)
ECHO TV REGURGITANT MAX VELOCITY: 2.24 M/S
ECHO TV REGURGITANT PEAK GRADIENT: 20 MMHG
EKG ATRIAL RATE: 63 BPM
EKG DIAGNOSIS: NORMAL
EKG P AXIS: 95 DEGREES
EKG P-R INTERVAL: 98 MS
EKG Q-T INTERVAL: 500 MS
EKG QRS DURATION: 164 MS
EKG QTC CALCULATION (BAZETT): 511 MS
EKG R AXIS: -70 DEGREES
EKG T AXIS: 92 DEGREES
EKG VENTRICULAR RATE: 63 BPM
ERYTHROCYTE [DISTWIDTH] IN BLOOD BY AUTOMATED COUNT: 15.6 % (ref 11.9–14.6)
GLUCOSE BLD STRIP.AUTO-MCNC: 101 MG/DL (ref 65–100)
GLUCOSE BLD STRIP.AUTO-MCNC: 124 MG/DL (ref 65–100)
GLUCOSE BLD STRIP.AUTO-MCNC: 162 MG/DL (ref 65–100)
GLUCOSE BLD STRIP.AUTO-MCNC: 189 MG/DL (ref 65–100)
GLUCOSE SERPL-MCNC: 136 MG/DL (ref 65–100)
HCT VFR BLD AUTO: 37.7 % (ref 35.8–46.3)
HDLC SERPL-MCNC: 73 MG/DL (ref 40–60)
HDLC SERPL: 3.8 {RATIO}
HGB BLD-MCNC: 11.8 G/DL (ref 11.7–15.4)
LDLC SERPL CALC-MCNC: 184.6 MG/DL
LV EF: 43 %
LVEF MODALITY: ABNORMAL
MAGNESIUM SERPL-MCNC: 2.2 MG/DL (ref 1.8–2.4)
MCH RBC QN AUTO: 29.1 PG (ref 26.1–32.9)
MCHC RBC AUTO-ENTMCNC: 31.3 G/DL (ref 31.4–35)
MCV RBC AUTO: 93.1 FL (ref 79.6–97.8)
NRBC # BLD: 0 K/UL (ref 0–0.2)
PLATELET # BLD AUTO: 196 K/UL (ref 150–450)
PMV BLD AUTO: 10.5 FL (ref 9.4–12.3)
POTASSIUM SERPL-SCNC: 3.4 MMOL/L (ref 3.5–5.1)
RBC # BLD AUTO: 4.05 M/UL (ref 4.05–5.2)
SERVICE CMNT-IMP: ABNORMAL
SODIUM SERPL-SCNC: 142 MMOL/L (ref 136–145)
TRIGL SERPL-MCNC: 82 MG/DL (ref 35–150)
TROPONIN I SERPL HS-MCNC: ABNORMAL PG/ML (ref 0–14)
TROPONIN I SERPL HS-MCNC: ABNORMAL PG/ML (ref 0–14)
VLDLC SERPL CALC-MCNC: 16.4 MG/DL (ref 6–23)
WBC # BLD AUTO: 9.1 K/UL (ref 4.3–11.1)

## 2022-10-04 PROCEDURE — 82962 GLUCOSE BLOOD TEST: CPT

## 2022-10-04 PROCEDURE — 6370000000 HC RX 637 (ALT 250 FOR IP): Performed by: INTERNAL MEDICINE

## 2022-10-04 PROCEDURE — 85027 COMPLETE CBC AUTOMATED: CPT

## 2022-10-04 PROCEDURE — 80048 BASIC METABOLIC PNL TOTAL CA: CPT

## 2022-10-04 PROCEDURE — 36415 COLL VENOUS BLD VENIPUNCTURE: CPT

## 2022-10-04 PROCEDURE — 93306 TTE W/DOPPLER COMPLETE: CPT | Performed by: INTERNAL MEDICINE

## 2022-10-04 PROCEDURE — 2580000003 HC RX 258: Performed by: INTERNAL MEDICINE

## 2022-10-04 PROCEDURE — 2580000003 HC RX 258: Performed by: NURSE PRACTITIONER

## 2022-10-04 PROCEDURE — 99232 SBSQ HOSP IP/OBS MODERATE 35: CPT | Performed by: INTERNAL MEDICINE

## 2022-10-04 PROCEDURE — 6360000002 HC RX W HCPCS: Performed by: INTERNAL MEDICINE

## 2022-10-04 PROCEDURE — 84484 ASSAY OF TROPONIN QUANT: CPT

## 2022-10-04 PROCEDURE — 80061 LIPID PANEL: CPT

## 2022-10-04 PROCEDURE — 1100000003 HC PRIVATE W/ TELEMETRY

## 2022-10-04 PROCEDURE — 6370000000 HC RX 637 (ALT 250 FOR IP): Performed by: NURSE PRACTITIONER

## 2022-10-04 PROCEDURE — C8929 TTE W OR WO FOL WCON,DOPPLER: HCPCS

## 2022-10-04 PROCEDURE — 83735 ASSAY OF MAGNESIUM: CPT

## 2022-10-04 PROCEDURE — 6360000004 HC RX CONTRAST MEDICATION: Performed by: INTERNAL MEDICINE

## 2022-10-04 RX ORDER — LISINOPRIL 5 MG/1
10 TABLET ORAL EVERY 12 HOURS
Status: DISCONTINUED | OUTPATIENT
Start: 2022-10-04 | End: 2022-10-06 | Stop reason: HOSPADM

## 2022-10-04 RX ORDER — CARVEDILOL 6.25 MG/1
6.25 TABLET ORAL 2 TIMES DAILY WITH MEALS
Status: DISCONTINUED | OUTPATIENT
Start: 2022-10-04 | End: 2022-10-06 | Stop reason: HOSPADM

## 2022-10-04 RX ORDER — DEXTROSE MONOHYDRATE 100 MG/ML
INJECTION, SOLUTION INTRAVENOUS CONTINUOUS PRN
Status: DISCONTINUED | OUTPATIENT
Start: 2022-10-04 | End: 2022-10-06 | Stop reason: HOSPADM

## 2022-10-04 RX ADMIN — ATORVASTATIN CALCIUM 80 MG: 80 TABLET, FILM COATED ORAL at 20:32

## 2022-10-04 RX ADMIN — ASPIRIN 81 MG: 81 TABLET, CHEWABLE ORAL at 08:46

## 2022-10-04 RX ADMIN — PERFLUTREN 0.45 ML: 6.52 INJECTION, SUSPENSION INTRAVENOUS at 09:54

## 2022-10-04 RX ADMIN — PANTOPRAZOLE SODIUM 40 MG: 40 TABLET, DELAYED RELEASE ORAL at 08:47

## 2022-10-04 RX ADMIN — ENOXAPARIN SODIUM 40 MG: 100 INJECTION SUBCUTANEOUS at 08:47

## 2022-10-04 RX ADMIN — MEGESTROL ACETATE 40 MG: 40 TABLET ORAL at 08:54

## 2022-10-04 RX ADMIN — CARVEDILOL 6.25 MG: 6.25 TABLET, FILM COATED ORAL at 17:09

## 2022-10-04 RX ADMIN — SODIUM CHLORIDE: 9 INJECTION, SOLUTION INTRAVENOUS at 00:14

## 2022-10-04 RX ADMIN — SODIUM CHLORIDE, PRESERVATIVE FREE 10 ML: 5 INJECTION INTRAVENOUS at 08:47

## 2022-10-04 RX ADMIN — LEVOTHYROXINE SODIUM 100 MCG: 0.1 TABLET ORAL at 08:47

## 2022-10-04 RX ADMIN — SENNOSIDES AND DOCUSATE SODIUM 1 TABLET: 8.6; 5 TABLET ORAL at 20:32

## 2022-10-04 RX ADMIN — LISINOPRIL 10 MG: 5 TABLET ORAL at 20:32

## 2022-10-04 RX ADMIN — MAGNESIUM GLUCONATE 500 MG ORAL TABLET 400 MG: 500 TABLET ORAL at 08:47

## 2022-10-04 RX ADMIN — CARVEDILOL 3.12 MG: 3.12 TABLET, FILM COATED ORAL at 08:47

## 2022-10-04 RX ADMIN — ENOXAPARIN SODIUM 40 MG: 100 INJECTION SUBCUTANEOUS at 00:35

## 2022-10-04 RX ADMIN — CLOPIDOGREL BISULFATE 75 MG: 75 TABLET ORAL at 08:46

## 2022-10-04 RX ADMIN — LISINOPRIL 10 MG: 5 TABLET ORAL at 08:47

## 2022-10-04 RX ADMIN — SODIUM CHLORIDE, PRESERVATIVE FREE 10 ML: 5 INJECTION INTRAVENOUS at 21:11

## 2022-10-04 ASSESSMENT — PAIN SCALES - GENERAL
PAINLEVEL_OUTOF10: 0

## 2022-10-04 NOTE — PROGRESS NOTES
STEMI Dr Up Gist  Stent x1/POBA RCA  RRA - 12 ml air in band @ 2315  Heparin 9000 units total  Plavix 600 mg PO  Mylanta 30 ml PO

## 2022-10-04 NOTE — ED NOTES
Pt pace maker stopped capturing pt had a stemi, alert called staff and MD at bedside, ems stayed so pt could be transferred to DT accepting MD Emma Cole < charge RN called for report       Eliza Hinds RN  10/03/22 0567

## 2022-10-04 NOTE — PROGRESS NOTES
TRANSFER - IN REPORT:    Verbal report received from [employer] (name) on Bula Copping  being received from cath lab(unit) for routine progression of patient care      Report consisted of patients Situation, Background, Assessment and   Recommendations(SBAR). Information from the following report(s) Nurse Handoff Report, Index, Recent Results, and Cardiac Rhythm a paced  was reviewed with the receiving nurse. Opportunity for questions and clarification was provided.       Assessment completed upon patients arrival to unit and care assume

## 2022-10-04 NOTE — PROGRESS NOTES
TRANSFER - IN REPORT:    Verbal report received from [facility] on Katharina Rodriguez being received from ICU RN () for routine progression of care. Report consisted of patients Situation, Background, Assessment and Recommendations(SBAR). Information from the following report(s) SBAR, Kardex, Procedure Summary, and Cardiac Rhythm A paced  was reviewed. Opportunity for questions and clarification was provided. Assessment completed upon patients arrival to unit and care assumed. Patient received to room 306. Patient connected to monitor and assessment completed. Plan of care reviewed. Patient oriented to room and call light. Patient aware to use call light to communicate any chest pain or needs. Admission skin assessment completed with second RN and reveals the following: sacrum and heels intact and free of redness. Pt presents to unit with right radial cath site C/D/I with no hematoma present. Pt has scattered scabbed abrasions present on admission. Dual Skin Assessment completed with Angela Person RN.

## 2022-10-04 NOTE — PROGRESS NOTES
Pt arrived from cath lab to 3108. A&Ox3. Some intermittent confusion on time. TR band right radial. 12 ml in. Radial pulse palpable +2. Skin assessment completed with [employer]. Skin intact. No wounds noted. Allevyn applied to sacrum.

## 2022-10-04 NOTE — ED PROVIDER NOTES
Emergency Department Provider Note                   PCP:                DEANDRA Baker NP               Age: 81 y.o. Sex: female       ICD-10-CM    1. ST elevation myocardial infarction (STEMI), unspecified artery ([facility]  I21.3           DISPOSITION Decision To Transfer 10/03/2022 09:52:18 PM       MDM  Number of Diagnoses or Management Options  ST elevation myocardial infarction (STEMI), unspecified artery ([facility] [address]  Diagnosis management comments: I wore appropriate PPE throughout this patient's ED visit. Rhianna Echevarria MD, 9:55 PM    Patient intermittently pacing. On paced rhythm showed marked ST elevation inferolateral leads with reciprocal depressions. STEMI called. Discussed with Dr. Shaan Falcon, cardiology, and accepted patient in transfer. Given aspirin and heparin. BP 90/50's. Given NS bolus. Pt awake and conversive, but somnolent. Right sided EJ 18G IV placed without difficulty due to difficult IV access. Amount and/or Complexity of Data Reviewed  Clinical lab tests: ordered  Tests in the radiology section of CPT®: ordered  Tests in the medicine section of CPT®: ordered and reviewed  Review and summarize past medical records: yes  Discuss the patient with other providers: yes  Independent visualization of images, tracings, or specimens: yes    Risk of Complications, Morbidity, and/or Mortality  Presenting problems: high  Diagnostic procedures: high  Management options: high  General comments: Critical care time: 30 minutes of critical care time was performed in the emergency department. This was separate from any other procedures listed during the patients emergency department course. The failure to initiate these interventions on an urgent basis would likely have resulted in sudden, clinically significant or life-threatening deterioration in the patients condition.                  Orders Placed This Encounter   Procedures    CBC    Comprehensive Metabolic Panel    Troponin Cardiac Monitor    Pulse Oximetry    EKG 12 Lead    Saline lock IV        Medications   aspirin chewable tablet 324 mg (324 mg Oral Given 10/3/22 4765)   heparin (porcine) injection 4,000 Units (has no administration in time range)       New Prescriptions    No medications on file        Magan Ravi is a 81 y.o. female who presents to the Emergency Department with chief complaint of  No chief complaint on file. 81-year-old female with a history of diabetes, hypertension, pacemaker presents with chest pain after a fall. EMS reported chest pain related to old rib fracture initially. After patient arrived to the emergency department and while still on the EMS stretcher, they state that her heart rate decreased into the 20s. There were no further pacemaker spikes and a repeat EKG was obtained that showed ST elevation in inferior lateral leads. Patient taken immediately to a room and STEMI called. Patient now reporting chest pain intermittently since last night that worsened tonight with shortness of breath and diaphoresis with generalized weakness. All other systems reviewed and are negative unless otherwise stated in the history of present illness section. Review of Systems   Unable to perform ROS: Acuity of condition   Constitutional:  Positive for diaphoresis and fatigue. Respiratory:  Positive for shortness of breath. Cardiovascular:  Positive for chest pain. 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 Dr. Bhaskar Mccullough        No family history on file.      Social History     Socioeconomic History    Marital status: Single   Tobacco Use    Smoking status: Never    Smokeless tobacco: Never   Substance and Sexual Activity    Alcohol use: Never    Drug use: Never Social History Narrative    ** Merged History Encounter **             Allergies: Patient has no known allergies. Previous Medications    ACETAMINOPHEN (TYLENOL) 325 MG TABLET    Take 325 mg by mouth    AMLODIPINE (NORVASC) 5 MG TABLET    TAKE 1 TABLET (5 MG) BY MOUTH NIGHTLY    ATORVASTATIN (LIPITOR) 80 MG TABLET    Take 80 mg by mouth    DOXAZOSIN (CARDURA) 4 MG TABLET    TAKE 1 TAB (4 MG TOTAL) BY MOUTH EVERY EVENING    HYDROCHLOROTHIAZIDE (HYDRODIURIL) 25 MG TABLET    TAKE 1 TAB (25 MG TOTAL) BY MOUTH EVERY MORNING    IMATINIB (GLEEVEC) 400 MG CHEMO TABLET    Take 400 mg by mouth    LACTULOSE (CHRONULAC) 10 GM/15ML SOLUTION    Take 10 g by mouth    LEVOTHYROXINE (SYNTHROID) 100 MCG TABLET    Take by mouth every morning (before breakfast)    LISINOPRIL (PRINIVIL;ZESTRIL) 10 MG TABLET    Take by mouth daily    MAGNESIUM OXIDE (MAG-OX) 400 (240 MG) MG TABLET    Take 400 mg by mouth    MECLIZINE (ANTIVERT) 25 MG TABLET    Take by mouth 3 times daily as needed    MEGESTROL (MEGACE) 40 MG TABLET    TAKE 1 TABLET BY MOUTH EVERY DAY    METFORMIN (GLUCOPHAGE) 500 MG TABLET    Take 500 mg by mouth    OFLOXACIN (OCUFLOX) 0.3 % SOLUTION    Apply 2 drops to eye 4 times daily    SENNA-DOCUSATE (PERICOLACE) 8.6-50 MG PER TABLET    Take 2 tablets by mouth        Vitals signs and nursing note reviewed. No data found. Physical Exam  Vitals and nursing note reviewed. Constitutional:       Appearance: Normal appearance. She is well-developed. She is ill-appearing and diaphoretic. HENT:      Head: Normocephalic and atraumatic. Nose: Nose normal.      Mouth/Throat:      Mouth: Mucous membranes are moist.   Eyes:      Extraocular Movements: Extraocular movements intact. Pupils: Pupils are equal, round, and reactive to light. Cardiovascular:      Rate and Rhythm: Normal rate and regular rhythm. Pulmonary:      Effort: Pulmonary effort is normal. No respiratory distress.    Abdominal:      General: Abdomen is flat. There is no distension. Musculoskeletal:         General: Normal range of motion. Skin:     General: Skin is warm. Coloration: Skin is pale. Neurological:      General: No focal deficit present. Mental Status: She is alert. Mental status is at baseline. Psychiatric:         Mood and Affect: Mood normal.        Procedures    ED EKG Interpretation  EKG was interpreted in the absence of a cardiologist.    Rate: 60  EKG Interpretation: EKG Interpretation: paced rhythm  ST Segments: STEMI on EMS EKG, and ST elevation inferior leads with paced rhythm    No results found for any visits on 10/03/22. No orders to display                         Voice dictation software was used during the making of this note. This software is not perfect and grammatical and other typographical errors may be present. This note has not been completely proofread for errors.      Diana Mosqueda MD  10/03/22 . Lita Epps MD  10/03/22 4209

## 2022-10-04 NOTE — H&P
[facility] History & Physical      Date of  Admission: 10/3/2022 10:30 PM     Primary Care Physician: Kennedy Hamilton NP  Primary Cardiologist: [facility]  Admitting Physician: Dr. Jeovanny Reynolds    CC: chest pain    HPI:  Magan Ravi is a 81 y.o. female with past medical history of generalized weakness, vertigo, DM2, HTN, symptomatic bradycardia s/p PPM in 2020, and former tobacco abuse who presented to the ER at [facility] via EMS with complaints of chest pain. EMS was initially called to patient's home due fall with weakness. Patient complained of chest pain worse with deep breath. Patient does have history of fracture rib that was diagnosed in August. EKG done by EMS en route to the ER showed paced rhythm. Upon arrival to the ER, EMS noted that patient had a drop in HR. At some point, EKG was shot that showed inferolateral ST elevation. ST protocol was activated. Patient was given 324mg ASA and 4000 units IV heparin prior transfer to [facility] facility. Upon arrival to [facility], patient denies chest pain currently. 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   Dr. Bhaskar Mccullough       No Known Allergies   Social History     Socioeconomic History    Marital status: Single     Spouse name: Not on file    Number of children: Not on file    Years of education: Not on file    Highest education level: Not on file   Occupational History    Not on file   Tobacco Use    Smoking status: Never    Smokeless tobacco: Never   Substance and Sexual Activity    Alcohol use: Never    Drug use: Never    Sexual activity: Not on file   Other Topics Concern    Not on file   Social History Narrative    ** Merged History Encounter **          Social Determinants of Health     Financial Resource Strain: Not on file   Food Insecurity: Not on file   Transportation Needs: Not on file   Physical Activity: Not on file   Stress: Not on file   Social Connections: Not on file   Intimate Partner Violence: Not on file   Housing Stability: Not on file     No family history on file. No current facility-administered medications for this encounter. Current Outpatient Medications   Medication Sig    acetaminophen (TYLENOL) 325 MG tablet Take 325 mg by mouth    amLODIPine (NORVASC) 5 MG tablet TAKE 1 TABLET (5 MG) BY MOUTH NIGHTLY    atorvastatin (LIPITOR) 80 MG tablet Take 80 mg by mouth    doxazosin (CARDURA) 4 MG tablet TAKE 1 TAB (4 MG TOTAL) BY MOUTH EVERY EVENING    hydroCHLOROthiazide (HYDRODIURIL) 25 MG tablet TAKE 1 TAB (25 MG TOTAL) BY MOUTH EVERY MORNING    imatinib (GLEEVEC) 400 MG chemo tablet Take 400 mg by mouth    lactulose (CHRONULAC) 10 GM/15ML solution Take 10 g by mouth    levothyroxine (SYNTHROID) 100 MCG tablet Take by mouth every morning (before breakfast)    lisinopril (PRINIVIL;ZESTRIL) 10 MG tablet Take by mouth daily    magnesium oxide (MAG-OX) 400 (240 Mg) MG tablet Take 400 mg by mouth    meclizine (ANTIVERT) 25 MG tablet Take by mouth 3 times daily as needed    megestrol (MEGACE) 40 MG tablet TAKE 1 TABLET BY MOUTH EVERY DAY    metFORMIN (GLUCOPHAGE) 500 MG tablet Take 500 mg by mouth    ofloxacin (OCUFLOX) 0.3 % solution Apply 2 drops to eye 4 times daily    senna-docusate (PERICOLACE) 8.6-50 MG per tablet Take 2 tablets by mouth     Facility-Administered Medications Ordered in Other Encounters   Medication Dose Route Frequency    heparin infusion 2 units/mL in 0.9% NaCl    Continuous PRN    lidocaine 1 % injection    PRN       Review of Systems    Review of Systems   Constitutional: Positive for malaise/fatigue. HENT: Negative. Eyes: Negative.     Cardiovascular: Positive for chest pain. Respiratory: Negative. Endocrine: Negative. Hematologic/Lymphatic: Negative. Skin: Negative. Musculoskeletal:  Positive for falls. Gastrointestinal: Negative. Genitourinary: Negative. Neurological:  Positive for dizziness. Psychiatric/Behavioral: Negative. Allergic/Immunologic: Negative. Subjective: There were no vitals taken for this visit. Physical Exam  HENT:      Mouth/Throat:      Mouth: Mucous membranes are moist.   Eyes:      Pupils: Pupils are equal, round, and reactive to light. Cardiovascular:      Rate and Rhythm: Normal rate. Heart sounds: Normal heart sounds. Pulmonary:      Breath sounds: Normal breath sounds. Abdominal:      General: Bowel sounds are normal.   Musculoskeletal:         General: No swelling. Skin:     General: Skin is warm and dry. Neurological:      Mental Status: She is alert. Mental status is at baseline.    Psychiatric:         Mood and Affect: Mood normal.        Cardiographics  Telemetry:  paced  ECG: paced  Echocardiogram:  ordered    Labs:   Recent Results (from the past 24 hour(s))   CBC    Collection Time: 10/03/22  9:52 PM   Result Value Ref Range    WBC 7.1 4.3 - 11.1 K/uL    RBC 4.26 4.05 - 5.2 M/uL    Hemoglobin 12.3 11.7 - 15.4 g/dL    Hematocrit 38.8 35.8 - 46.3 %    MCV 91.1 79.6 - 97.8 FL    MCH 28.9 26.1 - 32.9 PG    MCHC 31.7 31.4 - 35.0 g/dL    RDW 15.6 (H) 11.9 - 14.6 %    Platelets 609 440 - 592 K/uL    MPV 9.9 9.4 - 12.3 FL    nRBC 0.00 0.0 - 0.2 K/uL   EKG 12 Lead    Collection Time: 10/03/22  9:56 PM   Result Value Ref Range    Ventricular Rate 63 BPM    Atrial Rate 63 BPM    P-R Interval 98 ms    QRS Duration 164 ms    Q-T Interval 500 ms    QTc Calculation (Bazett) 511 ms    P Axis 95 degrees    R Axis -70 degrees    T Axis 92 degrees    Diagnosis Sinus rhythm with short TX        Patient has been seen and examined by Dr. Karen Melo and he agrees with the following assessment and plan:     Assessment/Plan:          STEMI (ST elevation myocardial infarction) ([facility] -- proceed with emergent LHC. Admit to critical care unit post-procedure. Start ASA 81mg, Plavix 75mg and Coreg 3.125 BID on admission. Continue Lipitor 80mg and lisinopril 10mg on admission. IV hydration for 10 hours post-procedure. Check echocardiogram and lipid panel in the AM.       HTN (hypertension) -- holding PTA medications except lisinopril. Start Coreg 3.125mg BID. Monitor BP closely. Titrate medications as needed      Type 2 diabetes mellitus ([facility] -- holding metformin for 48 hours post procedure. Start SSI ACHS      Hypothyroidism --  continue home Synthroid dose.           DEANDRA Bonilla CNP  10/3/2022 10:31 PM

## 2022-10-04 NOTE — CARE COORDINATION
Case Management Assessment  Initial Evaluation    Date/Time of Evaluation: 10/4/2022 1:58 PM  Assessment Completed by: Amadou Dominguez RN    If patient is discharged prior to next notation, then this note serves as note for discharge by case management. Patient Name: Jb Banda                   YOB: 1935  Diagnosis: STEMI (ST elevation myocardial infarction) [facility]) [I21.3]                   Date / Time: 10/3/2022 10:30 PM    Patient Admission Status: Inpatient   Readmission Risk (Low < 19, Mod (19-27), High > 27): Readmission Risk Score: 15.5    Current PCP: DEANDRA Dwyer NP  PCP verified by CM? (P) Yes    Chart Reviewed: Yes      History Provided by: (P) Patient, Child/Family (son Sherren Blender)  Patient Orientation: (P) Person, Place, Situation    Patient Cognition: (P) Short Term Memory Deficit    Hospitalization in the last 30 days (Readmission):  No    If yes, Readmission Assessment in CM Navigator will be completed. Advance Directives:      Code Status: Full Code       Discharge Planning:    Patient lives with:   Type of Home:    Primary Care Giver: (P) Self  Patient Support Systems include: (P) Children, Family Members   Current Financial resources: (P) Medicare ([facility])  Current community resources:  none  Current services prior to admission:  none            Current DME:  robles teague            Type of Home Care services:  n/a     ADLS  Prior functional level: (P) Independent in ADLs/IADLs  Current functional level: (P) Other (see comment) (ICU)    PT AM-PAC:   /24  OT AM-PAC:   /24    Family can provide assistance at DC: (P) Yes  Would you like Case Management to discuss the discharge plan with any other family members/significant others, and if so, who? (P) Yes (sons)  Plans to Return to Present Housing: (P) Yes  Other Identified Issues/Barriers to RETURNING to current housing: none  Potential Assistance needed at discharge:  [place]?             Potential DME:  unknown  Patient expects to discharge to: (P) Unknown  Plan for transportation at discharge: (P) Family    Financial    Payor: [facility] MEDICARE / Plan: [place] HMO / Product Type: *No Product type* /     Does insurance require precert for SNF: Yes    Potential assistance Purchasing Medications:    Meds-to-Beds request:      No Pharmacies Listed    Notes:    Factors facilitating achievement of predicted outcomes: Family support and Pleasant    Barriers to discharge: none at present    Additional Case Management Notes: Pt seen in ICU s/p admission for STEMI. Alert and oriented, but does repeat information over and over. SonRigoberto at bedside. Confirms demographics and screen. No current HH or rehab. Pt lives with sonTash. Independent at home with ADL's per pt and son. Aware CM to follow for d/c needs/POC.       Stoney Burkitt, RN  Case Management Department

## 2022-10-04 NOTE — ED NOTES
TRANSFER - OUT REPORT:    Verbal report given to charge nurse Mica Beckham  on Mike Mckinley  being transferred to ER SFDT for change in patient condition (stemi)       Report consisted of patient's Situation, Background, Assessment and   Recommendations(SBAR). Information from the following report(s) ED SBAR was reviewed with the receiving nurse. Lines:   Peripheral IV 10/03/22 Right External Jugular (Active)        Opportunity for questions and clarification was provided.       Patient transported with:  Monitor     Author NEETU Bob  10/03/22 5271

## 2022-10-04 NOTE — PROGRESS NOTES
Initial visit was made, prayer, emotional support and a spiritual presence were provided.  kings attention to the  telephone number at the bottom of the white board.       Eveline Morton, [address] [facility], [facility]

## 2022-10-04 NOTE — PROGRESS NOTES
Occupational Therapy Note:    Participated in interdisciplinary rounds in ICU/CCU and chart reviewed. Patient is experiencing decrease in function from baseline. Patient would benefit from skilled acute therapy to increase independence with self care/ADLs, strength, endurance, and functional mobility. Recommend OT/PT consult when medically stable and MD agrees.     Thank you for your consideration,  [place]ana Webb, OTR/L

## 2022-10-04 NOTE — ACP (ADVANCE CARE PLANNING)
Advance Care Planning     General Advance Care Planning (ACP) Conversation    Date of Conversation: 10/4/2022    Healthcare Decision Maker: Pt single has 2 children. No healthcare decision makers have been documented. Click here to complete [address] including selection of the Healthcare Decision Maker Relationship (ie \"Primary\")   Today we documented Decision Maker(s) consistent with Legal Next of Kin hierarchy. Content/Action Overview:  No LW/HCPOA on file. Children/all or majority are legal NOK. Full code per MD orders.      Length of Voluntary ACP Conversation in minutes:  <16 minutes (Non-Billable)    Hugh Guadalupe RN

## 2022-10-04 NOTE — ED NOTES
Heparin given via push 4000 units per Hamida Sadler ( 4 mls) per ej aprox 1489     Pura Brannon RN  10/03/22 1752

## 2022-10-04 NOTE — INTERDISCIPLINARY ROUNDS
Multi-D Rounds/Checklist (leapfrog):  Lines: can any be removed?: None     External Urinary Catheter (Active)      DVT Prophylaxis: Ordered  Vent: N/A  Nutrition Ordered/appropriate: Ordered  Can antibiotics or other drugs be stopped? Is Nozin performed: N/A  Consults needed: None  A: Is pain control adequate? (has PRNs? Stop drip?) Yes  B: Sedation break and SBT? N/A  C: Is sedation choice appropriate? N/A  D: Delirium/CAM-ICU? No  E: Mobility goals/appropriateness? Yes  F: Family update and plan? son is primary contact and is being updated daily by primary attending and nursing staff.     Estuardo Iverson, NP, APRN - CNP

## 2022-10-04 NOTE — PROGRESS NOTES
[facility] CARDIOLOGY PROGRESS NOTE           10/4/2022 2:03 PM    Admit Date: 10/3/2022      Subjective:   Patient appears hemodynamically stable. She denies chest pain. Blood pressures been labile. She appears confused. She describes social support at home is limited with frequent falls and injuries. ROS:  Cardiovascular:  As noted above    Objective:      Vitals:    10/04/22 1130 10/04/22 1200 10/04/22 1230 10/04/22 1300   BP: 131/69 131/73 (!) 161/72 (!) 166/84   Pulse: 60 60 60 60   Resp: (!) 9 21 13 15   Temp:  97.6 °F (36.4 °C)     TempSrc:  Oral     SpO2: 92% 96% 94% 99%       Physical Exam:  General-No Acute Distress  Neck- supple, no JVD  CV- regular rate and rhythm no MRG  Lung- clear bilaterally  Abd- soft, nontender, nondistended  Ext- no edema bilaterally. Skin- warm and dry    Data Review:   Recent Labs     10/03/22  2152 10/04/22  0531    142   K 3.6 3.4*   MG  --  2.2   BUN 16 13   WBC 7.1 9.1   HGB 12.3 11.8   HCT 38.8 37.7    196   CHOL  --  274*   HDL  --  73*       Assessment/Plan:     Principal Problem:    STEMI (ST elevation myocardial infarction) ([facility] [address]  Plan: Patient with acute inferior myocardial infarction. Echocardiogram today demonstrates mild LV systolic dysfunction with inferolateral hypokinesis present. Medical therapy appropriate. Continue carvedilol and lisinopril. She is stable on dual antiplatelet therapy. We will titrate carvedilol and lisinopril to address elevated blood pressure. Active Problems:    HTN (hypertension)  Plan: Increase carvedilol to 6.25 mg twice daily and lisinopril to 10 mg twice daily. Type 2 diabetes mellitus (HCC)  Plan: Sliding scale insulin. Hypothyroidism  Plan: Continue medical therapy with Synthroid. Patient with extensive social issues regarding support at home. Patient reports frequent falls while at home alone. We will consult social work, PT OT to assist with discharge planning.     Patient also appears to have advanced dementia. Uncertain whether patient's complaints about home situation are true. No family at bedside.         Moiz Carroll MD  10/4/2022 2:03 PM

## 2022-10-05 LAB
ANION GAP SERPL CALC-SCNC: 5 MMOL/L (ref 4–13)
BUN SERPL-MCNC: 15 MG/DL (ref 8–23)
CALCIUM SERPL-MCNC: 8.8 MG/DL (ref 8.3–10.4)
CHLORIDE SERPL-SCNC: 110 MMOL/L (ref 101–110)
CO2 SERPL-SCNC: 25 MMOL/L (ref 21–32)
CREAT SERPL-MCNC: 1.1 MG/DL (ref 0.6–1)
GLUCOSE BLD STRIP.AUTO-MCNC: 129 MG/DL (ref 65–100)
GLUCOSE BLD STRIP.AUTO-MCNC: 135 MG/DL (ref 65–100)
GLUCOSE BLD STRIP.AUTO-MCNC: 138 MG/DL (ref 65–100)
GLUCOSE BLD STRIP.AUTO-MCNC: 153 MG/DL (ref 65–100)
GLUCOSE SERPL-MCNC: 145 MG/DL (ref 65–100)
MAGNESIUM SERPL-MCNC: 2.5 MG/DL (ref 1.8–2.4)
POTASSIUM SERPL-SCNC: 3.1 MMOL/L (ref 3.5–5.1)
SARS-COV-2 RDRP RESP QL NAA+PROBE: NOT DETECTED
SERVICE CMNT-IMP: ABNORMAL
SODIUM SERPL-SCNC: 140 MMOL/L (ref 136–145)
SOURCE: NORMAL

## 2022-10-05 PROCEDURE — 6370000000 HC RX 637 (ALT 250 FOR IP): Performed by: INTERNAL MEDICINE

## 2022-10-05 PROCEDURE — 36415 COLL VENOUS BLD VENIPUNCTURE: CPT

## 2022-10-05 PROCEDURE — 2500000003 HC RX 250 WO HCPCS: Performed by: INTERNAL MEDICINE

## 2022-10-05 PROCEDURE — 97165 OT EVAL LOW COMPLEX 30 MIN: CPT

## 2022-10-05 PROCEDURE — 80048 BASIC METABOLIC PNL TOTAL CA: CPT

## 2022-10-05 PROCEDURE — 99231 SBSQ HOSP IP/OBS SF/LOW 25: CPT | Performed by: INTERNAL MEDICINE

## 2022-10-05 PROCEDURE — 6370000000 HC RX 637 (ALT 250 FOR IP): Performed by: NURSE PRACTITIONER

## 2022-10-05 PROCEDURE — 2580000003 HC RX 258: Performed by: NURSE PRACTITIONER

## 2022-10-05 PROCEDURE — 83735 ASSAY OF MAGNESIUM: CPT

## 2022-10-05 PROCEDURE — 97112 NEUROMUSCULAR REEDUCATION: CPT

## 2022-10-05 PROCEDURE — 87635 SARS-COV-2 COVID-19 AMP PRB: CPT

## 2022-10-05 PROCEDURE — 97535 SELF CARE MNGMENT TRAINING: CPT

## 2022-10-05 PROCEDURE — 1100000003 HC PRIVATE W/ TELEMETRY

## 2022-10-05 RX ADMIN — CARVEDILOL 6.25 MG: 6.25 TABLET, FILM COATED ORAL at 17:20

## 2022-10-05 RX ADMIN — LISINOPRIL 10 MG: 5 TABLET ORAL at 20:10

## 2022-10-05 RX ADMIN — LISINOPRIL 10 MG: 5 TABLET ORAL at 08:59

## 2022-10-05 RX ADMIN — SODIUM CHLORIDE, PRESERVATIVE FREE 10 ML: 5 INJECTION INTRAVENOUS at 09:12

## 2022-10-05 RX ADMIN — CARVEDILOL 6.25 MG: 6.25 TABLET, FILM COATED ORAL at 08:59

## 2022-10-05 RX ADMIN — ASPIRIN 81 MG: 81 TABLET, CHEWABLE ORAL at 08:59

## 2022-10-05 RX ADMIN — MAGNESIUM GLUCONATE 500 MG ORAL TABLET 400 MG: 500 TABLET ORAL at 08:59

## 2022-10-05 RX ADMIN — ATORVASTATIN CALCIUM 80 MG: 80 TABLET, FILM COATED ORAL at 20:10

## 2022-10-05 RX ADMIN — PANTOPRAZOLE SODIUM 40 MG: 40 TABLET, DELAYED RELEASE ORAL at 08:59

## 2022-10-05 RX ADMIN — SENNOSIDES AND DOCUSATE SODIUM 1 TABLET: 8.6; 5 TABLET ORAL at 20:10

## 2022-10-05 RX ADMIN — CLOPIDOGREL BISULFATE 75 MG: 75 TABLET ORAL at 08:59

## 2022-10-05 RX ADMIN — SODIUM CHLORIDE, PRESERVATIVE FREE 10 ML: 5 INJECTION INTRAVENOUS at 20:14

## 2022-10-05 RX ADMIN — Medication 5 UNITS: at 14:06

## 2022-10-05 RX ADMIN — LEVOTHYROXINE SODIUM 100 MCG: 0.1 TABLET ORAL at 08:59

## 2022-10-05 ASSESSMENT — PAIN SCALES - GENERAL
PAINLEVEL_OUTOF10: 0

## 2022-10-05 NOTE — PROGRESS NOTES
Cardiac Rehab: Spoke with patient regarding referral to cardiac rehab. Patient meets admission criteria based on STEMI with PCI (10/3/22). Written information about Cardiac Rehab given and reviewed with patient. Discussed lifestyle modifications to promote cardiac wellness. Patient indicated that she can not participate in the cardiac rehab program due to what she describes as vertigo with frequent falls at home. Her Cardiologist is Dr. Aurora Lopez.       Thank you,  JASVIR HernandezN, RN  Cardiopulmonary Rehabilitation Nurse Liaison  [facility]

## 2022-10-05 NOTE — PROGRESS NOTES
ACUTE OCCUPATIONAL THERAPY GOALS:   (Developed with and agreed upon by patient and/or caregiver.)  1. Patient will complete lower body bathing and dressing with supervision and adaptive equipment as needed. 2. Patient will complete toileting with supervision. 3. Patient will tolerate 30 minutes of OT treatment with 1-2 rest breaks to increase activity tolerance for ADLs. 4. Patient will complete functional transfers with supervision and adaptive equipment as needed. 5. Patient will complete functional mobility for household distances with supervision and adaptive equipment as needed. 6. Patient will complete self-grooming while standing edge of sink with supervision and adaptive equipment as needed. Timeframe: 7 visits       OCCUPATIONAL THERAPY Initial Assessment, Daily Note, and AM           Acknowledge Orders  Time  OT Charge Capture  Rehab Caseload Tracker      Donte Mitchell is a 81 y.o. female   PRIMARY DIAGNOSIS: STEMI (ST elevation myocardial infarction) ([facility] [address]  STEMI (ST elevation myocardial infarction) ([facility] [address] [I21.3]  Procedure(s) (LRB):  Coronary angiography (N/A)  Percutaneous coronary intervention (N/A)  2 Days Post-Op  Reason for Referral: Generalized Muscle Weakness (M62.81)  Inpatient: Payor: Alexis Lawler / Plan: [employer] GOLD PLUS HMO / Product Type: *No Product type* /     ASSESSMENT:     REHAB RECOMMENDATIONS:   Recommendation to date pending progress:  Setting:  Short-term Rehab    Equipment:    To Be Determined     ASSESSMENT:  Ms. Monica Hudson presents with deficits in overall strength, activity tolerance, ADL performance, and functional mobility. Presents for STEMI. Upon arrival, pt emotionally labile; confused as why she is in the hospital and why she cannot remember things. BUE (3+/5) are generally decreased but WFL. Min A for functional bed mobility/supine <> sit transfer to EOB; intact EOB unsupported sitting balance. Min A for sit <> stand; CGA x RW for mobility to bathroom. Performed self-grooming tasks, including washing face and brushing teeth, while seated EOB. CGA x RW for functional mobility for short household distances in room. OOB to chair. Tolerated therapy well however required max verbal cues for task redirection and facilitation. At this time, Theresa Duarte is functioning below baseline for ADL performance and functional mobility. Patient would benefit from skilled OT services to address goals and plan of care. [facility] [address] [id number] AM-PAC 6 Clicks Daily Activity Inpatient Short Form:    AM-PAC Daily Activity Inpatient   How much help for putting on and taking off regular lower body clothing?: A Lot  How much help for Bathing?: A Lot  How much help for Toileting?: A Little  How much help for putting on and taking off regular upper body clothing?: A Little  How much help for taking care of personal grooming?: A Little  How much help for eating meals?: A Little  AM-PAC Inpatient Daily Activity Raw Score: 16  AM-PAC Inpatient ADL T-Scale Score : 35.96  ADL Inpatient CMS 0-100% Score: 53.32  ADL Inpatient CMS G-Code Modifier : CK           SUBJECTIVE:     Ms. Bryce Ganser states, \"I am so upset. I don't know why I am here or where I am even. \"     Social/Functional Lives With: Son (lives with Emile)  Home Layout: One level  Home Equipment: [employer], [address] Help From: Family  Active : No  Patient's  Info: family  Lives in [facility]. Uses RW for functional mobility. Independent at baseline for Adls.    OBJECTIVE:     Dollene Reasons / Onalee Lauth / AIRWAY: None    RESTRICTIONS/PRECAUTIONS:  Restrictions/Precautions: Fall Risk    PAIN: VITALS / O2:   Pre Treatment:       Numeric 0-10  0/10    Post Treatment:   0/10       Vitals        WFL    Oxygen     WFL       GROSS EVALUATION: INTACT IMPAIRED   (See Comments)   UE AROM [x] []   UE PROM [x] []   Strength []  Generally weak     Posture / Balance []  Intact sitting; fair  standing    Sensation [x]     Coordination [x]       Tone [x]       Edema [x]    Activity Tolerance []    Generally decreased on RA   Hand Dominance R [] L []      COGNITION/  PERCEPTION: INTACT IMPAIRED   (See Comments)   Orientation []  confused   Vision []     Hearing []     Cognition  []  AMS   Perception []  Decreased insight into situation/deficits     MOBILITY: I Mod I S SBA CGA Min Mod Max Total  NT x2 Comments:   Bed Mobility    Rolling [] [] [] [] [] [] [] [] [] [x] []    Supine to Sit [] [] [] [] [] [x] [] [] [] [] []    Scooting [] [] [] [] [] [x] [] [] [] [] []    Sit to Supine [] [] [] [] [] [] [] [] [] [x] []    Transfers    Sit to Stand [] [] [] [] [] [x] [] [] [] [] []    Bed to Chair [] [] [] [] [] [x] [] [] [] [] [] X RW   Stand to Sit [] [] [] [] [] [x] [] [] [] [] []    Tub/Shower [] [] [] [] [] [] [] [] [] [] []     Toilet [] [] [] [] [] [] [] [] [] [] []      [] [] [] [] [] [] [] [] [] [] []    I=Independent, Mod I=Modified Independent, S=Supervision/Setup, SBA=Standby Assistance, CGA=Contact Guard Assistance, Min=Minimal Assistance, Mod=Moderate Assistance, Max=Maximal Assistance, Total=Total Assistance, NT=Not Tested    ACTIVITIES OF DAILY LIVING: I Mod I S SBA CGA Min Mod Max Total NT Comments   BASIC ADLs:              Upper Body Bathing  [] [] [] [] [] [] [] [] [] [x]    Lower Body Bathing [] [] [] [] [] [] [] [] [] [x]    Toileting [] [] [] [] [] [] [] [] [] [x]    Upper Body Dressing [] [] [] [] [] [] [] [] [] [x]    Lower Body Dressing [] [] [] [] [] [] [] [x] [] [] socks   Feeding [] [] [] [] [] [] [] [] [] [x]    Grooming [] [] [] [] [x] [] [] [] [] [] Standing EOS   Personal Device Care [] [] [] [] [] [] [] [] [] [x]    Functional Mobility [] [] [] [] [x] [x] [] [] [] [] X Rw   I=Independent, Mod I=Modified Independent, S=Supervision/Setup, SBA=Standby Assistance, CGA=Contact Guard Assistance, Min=Minimal Assistance, Mod=Moderate Assistance, Max=Maximal Assistance, Total=Total Assistance, NT=Not Tested    PLAN: FREQUENCY/DURATION     for duration of hospital stay or until stated goals are met, whichever comes first.    PROBLEM LIST:   (Skilled intervention is medically necessary to address:)  Decreased ADL/Functional Activities  Decreased Activity Tolerance  Decreased AROM/PROM  Decreased Balance  Decreased Cognition  Decreased Gait Ability  Decreased Safety Awareness  Decreased Strength  Decreased Transfer Abilities   INTERVENTIONS PLANNED:  (Benefits and precautions of occupational therapy have been discussed with the patient.)  Self Care Training  Therapeutic Activity  Therapeutic Exercise/HEP  Neuromuscular Re-education  Manual Therapy  Education         TREATMENT:     EVALUATION: LOW COMPLEXITY: (Untimed Charge)    TREATMENT:   Neuromuscular Re-education (13 Minutes): Neuromuscular Re-education included Balance Training, Coordination training, Postural training, Sitting balance training, and Standing balance training to improve Balance, Coordination, and Postural Control. Self Care (10 minutes): Patient participated in lower body dressing and grooming ADLs in unsupported sitting with maximal verbal cueing to increase independence, decrease assistance required, and increase activity tolerance. Patient also participated in functional mobility, functional transfer, and energy conservation training to increase independence, decrease assistance required, and increase activity tolerance.      TREATMENT GRID:  N/A    AFTER TREATMENT PRECAUTIONS: Alarm Activated, Call light within reach, Chair, Needs within reach, and RN notified    INTERDISCIPLINARY COLLABORATION:  RN/ PCT and OT/ MIN    EDUCATION:  Education Given To: Patient  Education Provided: Role of Therapy;Plan of Care  Barriers to Learning: Cognition  Education Outcome: Verbalized understanding;Demonstrated understanding      TOTAL TREATMENT DURATION AND TIME:  Time In: 1115  Time Out: [address]  Minutes: [address], OT

## 2022-10-05 NOTE — PROGRESS NOTES
[facility] CARDIOLOGY PROGRESS NOTE           10/5/2022 12:57 PM    Admit Date: 10/3/2022         Subjective: PCI 10/3/2022 doing well denies chest pain. Awaiting social work for disposition. ROS:  Cardiovascular:  As noted above    Objective:      Vitals:    10/05/22 0445 10/05/22 0845 10/05/22 0904 10/05/22 1138   BP: (!) 99/56 104/63  118/66   Pulse: 59  60 61   Resp: 18   17   Temp: 98.8 °F (37.1 °C)   97.9 °F (36.6 °C)   TempSrc: Oral   Oral   SpO2: 95%  97% 98%   Weight: 136 lb 11 oz (62 kg)              Physical Exam:  General: Well Developed, Well Nourished, No Acute Distress, Alert & Oriented x 3, Appropriate mood  Neck: supple, no JVD  Heart: S1S2 with RRR without murmurs or gallops  Lungs: Clear throughout auscultation bilaterally without adventitious sounds  Abd: soft, nontender, nondistended, with good bowel sounds  Ext: no edema bilaterally  Skin: warm and dry      Data Review:   Recent Labs     10/03/22  2152 10/04/22  0531 10/05/22  0349    142 140   K 3.6 3.4* 3.1*   MG  --  2.2 2.5*   BUN 16 13 15   WBC 7.1 9.1  --    HGB 12.3 11.8  --    HCT 38.8 37.7  --     196  --    CHOL  --  274*  --    HDL  --  73*  --        No results for input(s): TNIPOC in the last 72 hours. Invalid input(s): TROIQ        Assessment/Plan:     Principal Problem:    STEMI (ST elevation myocardial infarction) ([facility] [address]  Active Problems:    HTN (hypertension)    Type 2 diabetes mellitus ([facility] [address]    Hypothyroidism  Resolved Problems:    * No resolved hospital problems. *    A/p  1) STEMI - DAPT, statin  2) HTN - coreg and lisinopril controlled  3) HLD - statin  4) DM - SSI     Dispo pending SW recs.     Chris Shah MD  10/5/2022 12:57 PM

## 2022-10-05 NOTE — PROGRESS NOTES
Bedside and Verbal shift change report given to myself (oncoming nurse) by Renato Liang (offgoing nurse). Report included the following information Index, Intake/Output, MAR, Recent Results, and Med Rec Status.

## 2022-10-05 NOTE — CARE COORDINATION
LOS 2D  CM folloing for discharge planning. Patient admitted with STEMI. [facility] with PCI on 10/3. Patient had a short stint in ICU then transferred to tele on 10/4. PT/OT consulted. Await evaluations. CM attempted to call patient's son, Antwon Washburn (with whom she lives with) and Deana Kathleen at contact numbers. Restricted access and no VM set up. CM contacted Tri SALDANA [phone]. CM LVOM. Last contact, Jessika, patient's neighbor, answered my call. Jessika reports she was the patient is at [address] knocked on patient's door to see if patient's sons were at home and to update them on patient's disposition. No answer at the door. CM will await patient's sons to get to [facility] to discuss DCP. Addendum:  [id number]  CM informed both patient's sons are here visiting. CM informed of therapy recommendation for rehab. Both are in agreement. Deana Kathleen requesting that the staff tell patient \"physical therapy to get you stronger\". No preference from patient's sons for rehab. CM provided list of SNF [facility]  Referrals sent to [facility], [facility], [facility]. PPD active. [facility] offered a bed. CM to start prior auth with Gaye.

## 2022-10-06 VITALS
SYSTOLIC BLOOD PRESSURE: 108 MMHG | TEMPERATURE: 97.9 F | WEIGHT: 136.69 LBS | DIASTOLIC BLOOD PRESSURE: 57 MMHG | HEART RATE: 59 BPM | OXYGEN SATURATION: 93 % | BODY MASS INDEX: 22.75 KG/M2 | RESPIRATION RATE: 17 BRPM

## 2022-10-06 PROBLEM — F03.90 DEMENTIA (HCC): Status: ACTIVE | Noted: 2022-10-06

## 2022-10-06 LAB
GLUCOSE BLD STRIP.AUTO-MCNC: 108 MG/DL (ref 65–100)
GLUCOSE BLD STRIP.AUTO-MCNC: 185 MG/DL (ref 65–100)
MM INDURATION, POC: 0 MM (ref 0–5)
PPD, POC: NEGATIVE
SERVICE CMNT-IMP: ABNORMAL
SERVICE CMNT-IMP: ABNORMAL

## 2022-10-06 PROCEDURE — 82962 GLUCOSE BLOOD TEST: CPT

## 2022-10-06 PROCEDURE — 97162 PT EVAL MOD COMPLEX 30 MIN: CPT

## 2022-10-06 PROCEDURE — 6370000000 HC RX 637 (ALT 250 FOR IP): Performed by: NURSE PRACTITIONER

## 2022-10-06 PROCEDURE — 99238 HOSP IP/OBS DSCHRG MGMT 30/<: CPT | Performed by: INTERNAL MEDICINE

## 2022-10-06 PROCEDURE — 2580000003 HC RX 258: Performed by: NURSE PRACTITIONER

## 2022-10-06 PROCEDURE — 97530 THERAPEUTIC ACTIVITIES: CPT

## 2022-10-06 PROCEDURE — 6370000000 HC RX 637 (ALT 250 FOR IP): Performed by: INTERNAL MEDICINE

## 2022-10-06 RX ORDER — CARVEDILOL 6.25 MG/1
6.25 TABLET ORAL 2 TIMES DAILY WITH MEALS
Qty: 60 TABLET | Refills: 11
Start: 2022-10-06

## 2022-10-06 RX ORDER — CLOPIDOGREL BISULFATE 75 MG/1
75 TABLET ORAL DAILY
Qty: 30 TABLET | Refills: 11
Start: 2022-10-07

## 2022-10-06 RX ORDER — PANTOPRAZOLE SODIUM 40 MG/1
40 TABLET, DELAYED RELEASE ORAL
Qty: 30 TABLET | Refills: 5
Start: 2022-10-07

## 2022-10-06 RX ORDER — ASPIRIN 81 MG/1
81 TABLET, CHEWABLE ORAL DAILY
Qty: 30 TABLET | Refills: 11 | COMMUNITY
Start: 2022-10-07

## 2022-10-06 RX ORDER — NITROGLYCERIN 0.4 MG/1
0.4 TABLET SUBLINGUAL EVERY 5 MIN PRN
Qty: 25 TABLET | Refills: 2
Start: 2022-10-06

## 2022-10-06 RX ADMIN — CLOPIDOGREL BISULFATE 75 MG: 75 TABLET ORAL at 08:55

## 2022-10-06 RX ADMIN — CARVEDILOL 6.25 MG: 6.25 TABLET, FILM COATED ORAL at 08:55

## 2022-10-06 RX ADMIN — ASPIRIN 81 MG: 81 TABLET, CHEWABLE ORAL at 08:55

## 2022-10-06 RX ADMIN — PANTOPRAZOLE SODIUM 40 MG: 40 TABLET, DELAYED RELEASE ORAL at 08:55

## 2022-10-06 RX ADMIN — MAGNESIUM GLUCONATE 500 MG ORAL TABLET 400 MG: 500 TABLET ORAL at 08:55

## 2022-10-06 RX ADMIN — LEVOTHYROXINE SODIUM 100 MCG: 0.1 TABLET ORAL at 08:55

## 2022-10-06 RX ADMIN — SODIUM CHLORIDE, PRESERVATIVE FREE 10 ML: 5 INJECTION INTRAVENOUS at 08:57

## 2022-10-06 RX ADMIN — LISINOPRIL 10 MG: 5 TABLET ORAL at 08:55

## 2022-10-06 ASSESSMENT — PAIN SCALES - GENERAL
PAINLEVEL_OUTOF10: 0
PAINLEVEL_OUTOF10: 0

## 2022-10-06 NOTE — CARE COORDINATION
Call from Π[place] (liaison from [facility] ) question on Chemo medication. Related to the cost could the medication be placed on hold during the patient's time in rehab. RNCM spoke with Jazlyn Cash NP and she stated that Gleevec 400mg can be placed on hold during patient's time in rehab. Lucie Balderrama updated.

## 2022-10-06 NOTE — PROGRESS NOTES
ACUTE PHYSICAL THERAPY GOALS:   (Developed with and agreed upon by patient and/or caregiver.)  Pt will perform bed mobility (I) without use of rails or cueing in 7 therapy sessions. Pt will perform sit-to-stand/ stand-to-sit transfers Mod (I) c use of LRAD in 7 therapy sessions. Pt will ambulate 200 ft SB (A) with use of LRAD and breaks as needed in 7 therapy sessions. Pt will perform standing dynamic balance activities with minimal postural sway in 7 therapy sessions. Pt will tolerate multiple sets and reps of BLE exercises in 7 therapy sessions. PHYSICAL THERAPY Initial Assessment, Daily Note, and AM  (Link to Caseload Tracking: PT Visit Days : 1  Acknowledge Orders  Time In/Out  PT Charge Capture  Rehab Caseload Tracker    Deja Lewis is a 81 y.o. female   PRIMARY DIAGNOSIS: STEMI (ST elevation myocardial infarction) ([facility] [address]  STEMI (ST elevation myocardial infarction) ([facility] [address] [I21.3]  Procedure(s) (LRB):  Coronary angiography (N/A)  Percutaneous coronary intervention (N/A)  3 Days Post-Op  Reason for Referral: Difficulty in walking, Not elsewhere classified (R26.2)  Other abnormalities of gait and mobility (R26.89)  History of falling (Z91.81)  Inpatient: Payor: Herman Jones / Plan: HU[employer] GOLD PLUS HMO / Product Type: *No Product type* /     ASSESSMENT:     REHAB RECOMMENDATIONS:   Recommendation to date pending progress:  Setting:  Short-term Rehab    Equipment:    Rolling Walker  To Be Determined     ASSESSMENT:  Ms. Augusta Blake Is a 81 y.o. female presenting to PT s/p after being admitted on 10/3/22 for STEMI. At time of initial evaluation, pt presents below baseline LOF with deficits in bed mobility, strength, transfers, balance, gait and activity tolerance limiting her overall functional mobility. Today, pt performed all mobility with Min (A), inc time and cueing while requiring additional use of RW/gait belt for ambulation of 15'x1.  Of note, pt pleasantly confused throughout session and as such required frequent re-direction to stay on task; ultimately does well with repeated instruction provided. Pt requires Min (A) for sup to sit as well as sit-to-stand from EOB d/t notably inc postural sway and generally unsteady nature on her feet. Her gait is slow and shuffling in nature and she had 1-2 small LOB that she was largely able to self-correct. Pt showing poor safety awareness throughout session as she consistently overestimates her ability to perform activity without LOB/ miss-steps. While moving about on their feet, pt denying any dizziness, lightheadedness or SOB. At this time, pt is an appropriate candidate for skilled PT and will benefit from POC designed to address the aforementioned deficits. Upon completion of treatment, pt was positioned to comfort in chair with needs in reach. RN was made aware of pt performance.      DC Recommendation: [address] [facility] 6 Clicks Basic Mobility Inpatient Short Form  AM-PAC Mobility Inpatient   How much difficulty turning over in bed?: A Little  How much difficulty sitting down on / standing up from a chair with arms?: A Little  How much difficulty moving from lying on back to sitting on side of bed?: A Little  How much help from another person moving to and from a bed to a chair?: A Little  How much help from another person needed to walk in hospital room?: A Lot  How much help from another person for climbing 3-5 steps with a railing?: A Lot  AM-PAC Inpatient Mobility Raw Score : 16  AM-PAC Inpatient T-Scale Score : 40.78  Mobility Inpatient CMS 0-100% Score: 54.16  Mobility Inpatient CMS G-Code Modifier : CK    SUBJECTIVE:   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, \"I taught school for 30 years\"     Social/Functional Lives With: Son (lives with Jaime Old)  Home Layout: One level  Home Equipment: Mag[address], [address] Help From: Family  Active : No  Patient's  Info: family    OBJECTIVE:     PAIN: [place]a [place] / O2: Willie Rumple / [place] / Halima Darden:   Pre Treatment: 0/10         Post Treatment: 0/10 Vitals        Oxygen   RA   IV, Purewick, and Telemetry     RESTRICTIONS/PRECAUTIONS:  Restrictions/Precautions: Fall Risk                 GROSS EVALUATION: Intact Impaired (Comments):   AROM [x]     PROM []    Strength []  Globally weak/ deconditioned; BLE generally 3+/5 to 4/5   Balance []  Inc sway and miss-steps; unsteady    Posture [] Rounded Shoulders  Thoracic Kyphosis   Sensation [x]     Coordination [x]      Tone [x]     Edema []    Activity Tolerance []  Diminished    []      COGNITION/  PERCEPTION: Intact Impaired (Comments):   Orientation []  Oriented to person and place   Vision [x]  Not Formally Assessed    Hearing [x]  Not Formally Assessed    Cognition  []  Pleasantly confused throughout      MOBILITY: I Mod I S SBA CGA Min Mod Max Total  NT x2 Comments:   Bed Mobility    Rolling [] [] [] [] [] [x] [] [] [] [] []    Supine to Sit [] [] [] [] [] [x] [] [] [] [] []    Scooting [] [] [] [] [] [x] [] [] [] [] []    Sit to Supine [] [] [] [] [] [] [] [] [] [x] []    Transfers    Sit to Stand [] [] [] [] [] [x] [] [] [] [] []    Bed to Chair [] [] [] [] [] [x] [] [] [] [] []    Stand to Sit [] [] [] [] [] [x] [] [] [] [] []     [] [] [] [] [] [] [] [] [] [] []    I=Independent, Mod I=Modified Independent, S=Supervision, SBA=Standby Assistance, CGA=Contact Guard Assistance,   Min=Minimal Assistance, Mod=Moderate Assistance, Max=Maximal Assistance, Total=Total Assistance, NT=Not Tested    GAIT: I Mod I S SBA CGA Min Mod Max Total  NT x2 Comments:   Level of Assistance [] [] [] [] [] [x] [] [] [] [] []    Distance  15'x1    DME Gait Belt and Rolling Walker    Gait Quality Decreased gabi , Decreased step clearance, Decreased step length, Narrow base of support, Path deviations , Shuffling , and Trunk sway increased    Weightbearing Status Restrictions/Precautions  Restrictions/Precautions: Fall Risk    Stairs      I=Independent, Mod I=Modified Independent, S=Supervision, SBA=Standby Assistance, CGA=Contact Guard Assistance,   Min=Minimal Assistance, Mod=Moderate Assistance, Max=Maximal Assistance, Total=Total Assistance, NT=Not Tested    PLAN:   [address]: 3 times/week for duration of hospital stay or until stated goals are met, whichever comes first.    THERAPY PROGNOSIS: Fair    PROBLEM LIST:   (Skilled intervention is medically necessary to address:)  Decreased ADL/Functional Activities  Decreased Activity Tolerance  Decreased Balance  Decreased Gait Ability  Decreased Safety Awareness  Decreased Strength  Decreased Transfer Abilities INTERVENTIONS PLANNED:   (Benefits and precautions of physical therapy have been discussed with the patient.)  Self Care Training  Therapeutic Activity  Therapeutic Exercise/HEP  Gait Training  Education       TREATMENT:   EVALUATION: MODERATE COMPLEXITY: (Untimed Charge)    TREATMENT:   Therapeutic Activity (12 Minutes): Therapeutic activity included Rolling, Supine to Sit, Scooting, Lateral Scooting, Transfer Training, Ambulation on level ground, Sitting balance , and Standing balance to improve functional Activity tolerance, Balance, Mobility, and Strength.     TREATMENT GRID:  N/A    AFTER TREATMENT PRECAUTIONS: Alarm Activated, Bed/Chair Locked, Call light within reach, Chair, Needs within reach, RN at bedside, and RN notified    INTERDISCIPLINARY COLLABORATION:  RN/ PCT and PT/ PTA    EDUCATION: Education Given To: Patient  Education Provided: Role of Therapy    TIME IN/OUT:  Time In: Damon Bose  Time Out: [address]  Minutes: [facility], PT

## 2022-10-06 NOTE — DISCHARGE INSTRUCTIONS
the artery. A stent is a small, expandable tube. It presses against the walls of the artery. The stent is left in the artery to keep the artery open. This helps blood flow. The catheter is removed from your body. What happens right after the procedure? The catheter will be removed. Pressure may be applied to the area where the catheter was put into your blood vessel. This will help prevent bleeding. A small device may also be used to close the blood vessel. You may have a bandage or a compression device on the catheter site. After the procedure, you will be taken to a room where the catheter site and your heart rate, blood pressure, and temperature will be checked several times. If the catheter was put in your groin, you will need to lie still and keep your leg straight for up to a few hours. If the catheter was put in your wrist, you may need to keep your arm still for at least 2 hours. You may go home the same day. Or you may stay at least 1 night in the hospital. When you go home, you will get instructions from your doctor to help you recover well and prevent problems. Make sure to drink plenty of fluids (unless your doctor tells you not to) for several hours after the procedure. This will help flush the dye out of your body. Follow-up care is a key part of your treatment and safety. Be sure to make and go to all appointments, and call your doctor if you are having problems. It's also a good idea to know your test results and keep a list of the medicines you take. Where can you learn more? Go to https://CookBritesavanahAdCrimson.Brainwave Education. org and sign in to your DefenCall account. Enter Q868 in the MOD Systems box to learn more about \"Learning About Coronary Angioplasty. \"     If you do not have an account, please click on the \"Sign Up Now\" link. Current as of: January 10, 2022               Content Version: 13.4  © 2006-2022 Healthwise, Incorporated.    Care instructions adapted under license by [employer] [employer] [facility]. If you have questions about a medical condition or this instruction, always ask your healthcare professional. [address] any warranty or liability for your use of this information. Heart Attack: Care Instructions  Overview     A heart attack is an event that occurs when part of the heart muscle does not get enough blood and oxygen. This part of the heart starts to die. A heart attack is also called a myocardial infarction, or MI. A heart attack most often happens because blood flow through one or more of the coronary arteries is blocked. This blockage is usually caused by a blood clot that forms when plaque in the artery breaks open. After a heart attack, you may be worried about your future. Over the next several weeks, your heart will start to heal. Though it can be hard to break old habits, you can reduce your risk of having another heart attack. You can do this by making some lifestyle changes and by taking medicines. Follow-up care is a key part of your treatment and safety. Be sure to make and go to all appointments, and call your doctor if you are having problems. It's also a good idea to know your test results and keep a list of the medicines you take. How can you care for yourself at home? Activity    Until your doctor says it is okay, do not do strenuous exercise. And do not lift, pull, or push anything heavy. Ask your doctor what types of activities are safe for you. If your doctor has not set you up with a cardiac rehabilitation (rehab) program, talk to him or her about whether that is right for you. Cardiac rehab includes supervised exercise. It also includes help with diet and lifestyle changes and emotional support. It may reduce your risk of future heart problems. Increase your activities slowly. Take short rest breaks when you get tired. If your doctor recommends it, get more exercise. Walking is a good choice.  Bit by bit, increase the amount you walk every day. Try for at least 30 minutes on most days of the week. You also may want to swim, bike, or do other activities. Talk with your doctor before you start an exercise program to make sure it is safe for you. Ask your doctor when you can drive, go back to work, and do other daily activities again. You can have sex as soon as you feel ready for it. Often this means when you can easily walk around or climb stairs. Talk with your doctor if you have any concerns. If you are taking nitroglycerin, do not take erection-enhancing medicine such as sildenafil (Viagra), tadalafil (Cialis), or vardenafil (Levitra) . Lifestyle changes    Do not smoke. Smoking increases your risk of another heart attack. If you need help quitting, talk to your doctor about stop-smoking programs and medicines. These can increase your chances of quitting for good. Eat a heart-healthy diet that is low in saturated fat and salt, and is full of fruits, vegetables and whole-grains. You may get more details about how to eat healthy. But these tips can help you get started. Stay at a healthy weight, or lose weight if you need to. Medicines    Be safe with medicines. Take your medicines exactly as prescribed. Call your doctor if you think you are having a problem with your medicine. You will get more details on the specific medicines your doctor prescribes. Do not stop taking your medicine unless your doctor tells you to. Not taking your medicine might raise your risk of having another heart attack. You may need several medicines to help lower your risk of another heart attack. These include:  Blood pressure medicines such as angiotensin-converting enzyme (ACE) inhibitors, ARBs (angiotensin II receptor blockers), and beta-blockers. Cholesterol medicine called statins. Aspirin and other blood thinners. These prevent blood clots that can cause a heart attack.      If your doctor has given you nitroglycerin, keep it with you at all times. If you have angina symptoms, such as chest pain or pressure, sit down and rest. Take the first dose of nitroglycerin as directed. If symptoms get worse or are not getting better within 5 minutes, call 911 right away. Stay on the phone. The emergency  will tell you what to do. Do not take any over-the-counter medicines, vitamins, or herbal products without talking to your doctor first.   Staying healthy    Manage other health conditions such as high blood pressure and diabetes. Avoid colds and flu. Get a pneumococcal vaccine shot. If you have had one before, ask your doctor whether you need another dose. Get the flu vaccine every year. If you must be around people with colds or flu, wash your hands often. Be sure to tell your doctor about any angina symptoms you have had, even if they went away. Pay attention to your angina symptoms. Know what is typical for you and learn how to control it. Know when to call for help. Talk to your family, friends, or a counselor about your feelings. It is normal to feel frightened, angry, hopeless, helpless, and even guilty. Talking openly about bad feelings can help you cope. If you have symptoms of depression, talk to your doctor. When should you call for help? Call 911 anytime you think you may need emergency care. For example, call if:    You have symptoms of a heart attack. These may include:  Chest pain or pressure, or a strange feeling in the chest.  Sweating. Shortness of breath. Nausea or vomiting. Pain, pressure, or a strange feeling in the back, neck, jaw, or upper belly or in one or both shoulders or arms. Lightheadedness or sudden weakness. A fast or irregular heartbeat. After you call 911, the  may tell you to chew 1 adult-strength or 2 to 4 low-dose aspirin. Wait for an ambulance. Do not try to drive yourself.      You have angina symptoms (such as chest pain or pressure) that do not go away with rest or are not getting better within 5 minutes after you take a dose of nitroglycerin. You passed out (lost consciousness). You feel like you are having another heart attack. Call your doctor now or seek immediate medical care if:    You are having angina symptoms, such as chest pain or pressure, more often than usual, or the symptoms are different or worse than usual.     You have new or increased shortness of breath. You are dizzy or lightheaded, or you feel like you may faint. Watch closely for changes in your health, and be sure to contact your doctor if you have any problems. Where can you learn more? Go to https://SowesopePrompt.lyeweb.Neterion. org and sign in to your MoPals account. Enter W844 in the Harry's box to learn more about \"Heart Attack: Care Instructions. \"     If you do not have an account, please click on the \"Sign Up Now\" link. Current as of: January 10, 2022               Content Version: 13.4  © 2006-2022 Healthwise, Incorporated. Care instructions adapted under license by [facility] ([facility]). If you have questions about a medical condition or this instruction, always ask your healthcare professional. [place] [address] any warranty or liability for your use of this information.

## 2022-10-06 NOTE — PROGRESS NOTES
Called patient's son, Jerri Coffey, to let him know she will be leaving from here around 15:00 to go to [facility]. I asked him to bring her some clothes. He verbalized understanding.

## 2022-10-06 NOTE — PROGRESS NOTES
[facility] CARDIOLOGY PROGRESS NOTE           10/6/2022 10:28 AM    Admit Date: 10/3/2022      Subjective:   Patient appears hemodynamically stable. She denies chest pain. Blood pressures been labile. She appears confused. She describes social support at home is limited with frequent falls and injuries. ROS:  Cardiovascular:  As noted above    Objective:      Vitals:    10/06/22 0013 10/06/22 0017 10/06/22 0443 10/06/22 0730   BP: (!) 93/50 (!) 94/51 (!) 93/53 119/64   Pulse: 59 60 65 60   Resp: 20  20 16   Temp: 98.8 °F (37.1 °C)  98.6 °F (37 °C) 98.2 °F (36.8 °C)   TempSrc: Oral  Oral Oral   SpO2: 93% 95% 97% 97%   Weight:           Physical Exam:  General-No Acute Distress  Neck- supple, no JVD  CV- regular rate and rhythm no MRG  Lung- clear bilaterally  Abd- soft, nontender, nondistended  Ext- no edema bilaterally. Skin- warm and dry    Data Review:   Recent Labs     10/03/22  2152 10/04/22  0531 10/05/22  0349    142 140   K 3.6 3.4* 3.1*   MG  --  2.2 2.5*   BUN 16 13 15   WBC 7.1 9.1  --    HGB 12.3 11.8  --    HCT 38.8 37.7  --     196  --    CHOL  --  274*  --    HDL  --  73*  --          Assessment/Plan:     Principal Problem:    STEMI (ST elevation myocardial infarction) ([facility]  Plan: Patient with acute inferior myocardial infarction. Echocardiogram demonstrates mild LV systolic dysfunction with inferolateral hypokinesis present. Medical therapy appropriate. Continue carvedilol and lisinopril. She is stable on dual antiplatelet therapy. Active Problems:    HTN (hypertension)  Plan: Increase carvedilol to 6.25 mg twice daily and lisinopril to 10 mg twice daily. Type 2 diabetes mellitus (HCC)  Plan: Sliding scale insulin. Hypothyroidism  Plan: Continue medical therapy with Synthroid. Patient with extensive social issues regarding support at home. Patient reports frequent falls while at home alone.   We will consult social work, PT OT to assist with discharge planning. Patient also appears to have advanced dementia. Uncertain whether patient's complaints about home situation are true. No family at bedside.         Lisa Zambrano MD  10/6/2022 10:28 AM

## 2022-10-06 NOTE — CARE COORDINATION
Pt is for discharge today to SNF for [address]: [address]  Rm: 106D  Report: [phone]  Transport: [place]  Time: 1500  NP , RN  and facility liasion aware. attempted to call son no answer. No further  needs/supportive care orders received for CM at this time. Pt will have close follow up per facility    Milestones Met    ASSESSMENT NOTE    Attending Physician: No att. providers found  Admit Problem: STEMI (ST elevation myocardial infarction) ([facility] [I21.3]  Date/Time of Admission: 10/3/2022 10:30 PM  Problem List:  Patient Active Problem List   Diagnosis    STEMI (ST elevation myocardial infarction) ([facility]    HTN (hypertension)    Type 2 diabetes mellitus ([facility]    Hypothyroidism    Dementia [facility])       Service Assessment  Patient Orientation Person, Place, Situation   Cognition Short Term Memory Deficit   History Provided By Patient, Child/Family (son Jovan Yates)   Primary Caregiver Self   Accompanied By/Relationship     [address], Family Members   Patient's Healthcare Decision Maker is: Legal Next of Myriam 70   PCP Verified by CM Yes   Last Visit to PCP     Prior Functional Level Independent in ADLs/IADLs   Current Functional Level Other (see comment) (ICU)   Can patient return to prior living arrangement Yes   Ability to make needs known: Fair   Family able to assist with home care needs: Yes   Would you like for me to discuss the discharge plan with any other family members/significant others, and if so, who?  Yes (sons)   Financial Resources Medicare ([facility])   Community Resources     CM/SW Referral       Social/Functional History  Lives With Son (lives with Emile)   Type of [address] [facility] One level   Home Access     Entrance Stairs - Number of Steps     Entrance Stairs - Rails     Bathroom Shower/Tub     Bathroom Toilet     Bathroom Equipment     Bathroom Accessibility     Home Equipment [employer], [address] Help From [address] Grooming     Feeding     [address]     Laundry     Vacuuming     Cleaning     Gardening     Yard Work     Driving     Shopping          Other (Comment)     [address] Paying/Finance Responsibility     [address] Management     Other (Comment)     Ambulation Assistance     Transfer Assistance     Active  No   Patient's  Info family   Mode of Transportation     Education     Occupation     Type of Occupation       Discharge Planning   Type of [facility]   Living Arrangements     Support Systems Children, Family Members   Current Services Prior To Admission     Potential Assistance Needed     DME     DME     DME Ordered? Potential Assistance Purchasing Medications No   Meds-to-Beds: Does the patient want to have any new prescriptions delivered to bedside prior to discharge? Type of Home Care Services     Patient expects to be discharged to: Skilled nursing facility [facility])   Follow Up Appointment: Best Day/Time     One/Two Story Residence:     # of Interior Steps     Height of Each Step (in)     [employer] Available     History of Falls? Services At/After Discharge  Transition of Care Consult (CM Consult): SNF [facility] rehab)   Internal Home Health     Internal Hospice     Reason Outside Agency [address]     Partner SNF     Reason Why Partner SNF Not Chosen     Internal Comfort Care     Reason Outside [address] Discharge [place] (SNF)   [address] Provided?  No   Mode of Transport at Discharge BLS Stephanie Sparks transport @ 1500)   [facility] Transport Time of Discharge     Confirm Follow Up Transport Family     Condition of Participation: Discharge Planning  The plan for Transition of Care is related to the following treatment goals: ongoing therapy needs   The Patient and/or Patient Representative was provided with a Choice of Provider? Name of the Patient Representative who was provided with the Choice of Provider and agrees with the Discharge Plan? The Patient and/or Patient Representative Agree with the Discharge Plan? Freedom of Choice list was provided with basic dialogue that supports the individualized plan of care/goals, treatment preferences, and shares the quality data associated with the providers?  Yes     Documentation for Discharge Appeal  Discharge Appealed by     Date notified by QIO of appeal request:     Time notified by QIO of appeal request:     Detailed Notice of Discharge given to:     Date Notice of Discharge given:     Time Notice of Discharge given:     [facility]     [facility]     Date Notified of Outcome     Time Notified of Outcome     Outcome of appeal           Eli Blunt RN 10/06/22 4:51 PM

## 2022-10-06 NOTE — DISCHARGE SUMMARY
Patient seen and examined by me. Agree with above note by physician extender. Key findings are: Patient admitted with acute myocardial infarction. She has recovered well. Medications are appropriate. Vitals:    10/06/22 0017 10/06/22 0443 10/06/22 0730 10/06/22 1132   BP: (!) 94/51 (!) 93/53 119/64 (!) 108/57   Pulse: 60 65 60 59   Resp:  20 16 17   Temp:  98.6 °F (37 °C) 98.2 °F (36.8 °C) 97.9 °F (36.6 °C)   TempSrc:  Oral Oral Oral   SpO2: 95% 97% 97% 93%   Weight:            General: Patient is alert and oriented, no apparent distress  HEENT: Pupils equal reactive, oropharynx clear  Chest: Clear to auscultation bilaterally  Cardiovascular: S1-S2 regular with no murmur  Abdomen: Soft positive bowel sounds  Extremities: Soft no edema with intact distal pulses    Principal Problem:    STEMI (ST elevation myocardial infarction) ([facility]  Plan: Medications appropriate. Patient stable for discharge home. Arrange TC 7 follow-up in cardiac rehab.   Active Problems:    HTN (hypertension)  Plan: Continue medications as outlined in medication reconciliation    Type 2 diabetes mellitus ([facility]  Plan: Continue medications outlined on medication reconciliation    Hypothyroidism  Plan: Chronic    Dementia ([facility]  Plan: Chronic          La Naik MD                    [facility] Cardiology Discharge Summary     Patient ID:  Magan Ravi  [id number]  81 y.o.  1935    Admit date: 10/3/2022    Discharge date:  10/06/22     Admitting Physician: Sree Hensley MD     Discharge Physician: DEANDRA Danielson - VICENTE/Dr. Jacqueline Sunshine     Admission Diagnoses: STEMI (ST elevation myocardial infarction) [facility]) [I21.3]    Discharge Diagnoses:   Patient Active Problem List    Diagnosis Date Noted    Dementia [facility]) 10/06/2022    STEMI (ST elevation myocardial infarction) ([facility] 10/03/2022    HTN (hypertension) 10/03/2022    Type 2 diabetes mellitus ([facility] 10/03/2022    Hypothyroidism 10/03/2022       Cardiology Procedures this admission: Left heart catheterization with PCI  EchoCardiogram  Consults: none    Hospital Course: Patient with past medical history of generalized weakness, vertigo, DM2, HTN, symptomatic bradycardia s/p PPM in 2020, and former tobacco abuse who presented to the ER at [facility] via EMS with complaints of chest pain. EMS was initially called to patient's home due fall with weakness. Patient complained of chest pain worse with deep breath. Patient does have history of fracture rib that was diagnosed in August. EKG done by EMS en route to the ER showed paced rhythm. Upon arrival to the ER, EMS noted that patient had a drop in HR. At some point, EKG was shot that showed inferolateral ST elevation. ST protocol was activated. Patient was given 324mg ASA and 4000 units IV heparin prior transfer to [facility] facility. Patient underwent cardiac catheterization by Dr. Sujey Belle. Patient was found to have occlusion of pRCA that was stented with a 3.0 mm x 22 mm [place] RAHAT with 0% residual stenosis. Patient tolerated the procedure well and was taken to ICU/CCU for recovery. An echocardiogram showed 10/03/22    TRANSTHORACIC ECHOCARDIOGRAM (TTE) COMPLETE (CONTRAST/BUBBLE/3D PRN) 10/04/2022 10:26 AM (Final)    Interpretation Summary    Left Ventricle: Mildly reduced left ventricular systolic function with a visually estimated EF of 40 - 45%. Left ventricle size is normal. Mildly increased wall thickness. Severe hypokinesis of the following segments: mid inferior, mid inferolateral, apical inferior and apical lateral. Abnormal diastolic function. Aortic Valve: Mild regurgitation. Mitral Valve: Mild regurgitation. Tricuspid Valve: The estimated RVSP is 23 mmHg. Technical qualifiers: Color flow Doppler was performed and pulse wave and/or continuous wave Doppler was performed. Contrast used: Definity. Signed by: Catrachita Purcell MD on 10/4/2022 10:26 AM     The patient was seen by PT/OT and felt she would benefit from STR.  Case management discussed with pt/son and they were in agreement. The afternoon of 10/6/2022, the patient was feeling better without any complaints of chest pain or shortness of breath. Patient's right radial cath site was clean, dry and intact without hematoma or bruit. Patient's labs were WNL. Patient was seen and examined by Dr. Brent Nina and determined stable and ready for discharge. Patient was instructed on the importance of medication compliance including taking aspirin and plavix everyday without missing a dose. After receiving drug eluting stents, the patient will remain on dual anti-platelet therapy for 1 year. For maximized medical therapy for CAD, patient will continue BB, ACE-I, and high intensity statin as well. The patient will follow up with f/u with her regular cardiologist at [facility] post d/c from [address] [place] [address]. The patient was referred to cardiac rehab. DISPOSITION: The patient is being discharged home in stable condition on a low saturated fat, low cholesterol and low salt diet. The patient is instructed to advance activities as tolerated to the limit of fatigue or shortness of breath. The patient is instructed to avoid all heavy lifting for 5 days. The patient is instructed to watch the cath site for bleeding/oozing; if seen, the patient is instructed to apply firm pressure with a clean cloth and call [facility] at [phone]. The patient is instructed to watch for signs of infection which include: increasing area of redness, fever/hot to touch or purulent drainage at the catheterization site. The patient is instructed not to soak in a bathtub for 7-10 days, but is cleared to shower. The patient is instructed to call the office or return to the ER for immediate evaluation for any shortness of breath or chest pain not relieved by NTG.         Discharge Exam: BP (!) 108/57   Pulse 59   Temp 97.9 °F (36.6 °C) (Oral)   Resp 17   Wt 136 lb 11 oz (62 kg)   SpO2 93%   BMI 22.75 kg/m²   Patient has been seen by Dr. Jeannie Mendes: see his progress note for exam details. Recent Results (from the past 24 hour(s))   POCT Glucose    Collection Time: 10/05/22  3:52 PM   Result Value Ref Range    POC Glucose 129 (H) 65 - 100 mg/dL    Performed by: Autumn Coronado    COVID-19, Rapid    Collection Time: 10/05/22  5:28 PM    Specimen: Nasopharyngeal   Result Value Ref Range    Source NASAL      SARS-CoV-2, Rapid Not detected NOTD     POCT Glucose    Collection Time: 10/05/22  9:33 PM   Result Value Ref Range    POC Glucose 135 (H) 65 - 100 mg/dL    Performed by: Bryon    POCT Glucose    Collection Time: 10/06/22  7:31 AM   Result Value Ref Range    POC Glucose 108 (H) 65 - 100 mg/dL    Performed by: Alex    POCT Glucose    Collection Time: 10/06/22 11:36 AM   Result Value Ref Range    POC Glucose 185 (H) 65 - 100 mg/dL    Performed by: Alex    PLEASE READ & DOCUMENT PPD TEST IN 24 HRS    Collection Time: 10/06/22  2:01 PM   Result Value Ref Range    PPD, (POC) Negative Negative    mm Induration 0 0 - 5 mm         Patient Instructions:     Current Discharge Medication List        START taking these medications    Details   aspirin 81 MG chewable tablet Take 1 tablet by mouth daily  Qty: 30 tablet, Refills: 11      carvedilol (COREG) 6.25 MG tablet Take 1 tablet by mouth 2 times daily (with meals)  Qty: 60 tablet, Refills: 11      clopidogrel (PLAVIX) 75 MG tablet Take 1 tablet by mouth daily  Qty: 30 tablet, Refills: 11      nitroGLYCERIN (NITROSTAT) 0.4 MG SL tablet Place 1 tablet under the tongue every 5 minutes as needed for Chest pain up to max of 3 total doses. If no relief after 1 dose, call 911.   Qty: 25 tablet, Refills: 2      pantoprazole (PROTONIX) 40 MG tablet Take 1 tablet by mouth every morning (before breakfast)  Qty: 30 tablet, Refills: 5           CONTINUE these medications which have NOT CHANGED    Details   acetaminophen (TYLENOL) 325 MG tablet Take 325 mg by mouth atorvastatin (LIPITOR) 80 MG tablet Take 80 mg by mouth      imatinib (GLEEVEC) 400 MG chemo tablet Take 400 mg by mouth      lactulose (CHRONULAC) 10 GM/15ML solution Take 10 g by mouth      levothyroxine (SYNTHROID) 100 MCG tablet Take by mouth every morning (before breakfast)      lisinopril (PRINIVIL;ZESTRIL) 10 MG tablet Take by mouth daily      magnesium oxide (MAG-OX) 400 (240 Mg) MG tablet Take 400 mg by mouth      meclizine (ANTIVERT) 25 MG tablet Take by mouth 3 times daily as needed      megestrol (MEGACE) 40 MG tablet TAKE 1 TABLET BY MOUTH EVERY DAY      metFORMIN (GLUCOPHAGE) 500 MG tablet Take 500 mg by mouth      ofloxacin (OCUFLOX) 0.3 % solution Apply 2 drops to eye 4 times daily      senna-docusate (PERICOLACE) 8.6-50 MG per tablet Take 2 tablets by mouth           STOP taking these medications       amLODIPine (NORVASC) 5 MG tablet Comments:   Reason for Stopping:         doxazosin (CARDURA) 4 MG tablet Comments:   Reason for Stopping:         hydroCHLOROthiazide (HYDRODIURIL) 25 MG tablet Comments:   Reason for Stopping:                  Signed:  Luz Maria Combs APRN - CNP-C  10/6/2022  2:04 PM

## 2023-03-14 ENCOUNTER — HOSPITAL ENCOUNTER (EMERGENCY)
Age: 88
Discharge: HOME | End: 2023-03-15
Attending: STUDENT IN AN ORGANIZED HEALTH CARE EDUCATION/TRAINING PROGRAM
Payer: MEDICARE

## 2023-03-14 ENCOUNTER — APPOINTMENT (OUTPATIENT)
Dept: GENERAL RADIOLOGY | Age: 88
End: 2023-03-14
Payer: MEDICARE

## 2023-03-14 ENCOUNTER — APPOINTMENT (OUTPATIENT)
Dept: CT IMAGING | Age: 88
End: 2023-03-14
Payer: MEDICARE

## 2023-03-14 DIAGNOSIS — S42.211A CLOSED FRACTURE OF NECK OF RIGHT HUMERUS, INITIAL ENCOUNTER: Primary | ICD-10-CM

## 2023-03-14 LAB
ALBUMIN SERPL-MCNC: 4.4 G/DL (ref 3.2–4.6)
ALBUMIN/GLOB SERPL: 1 (ref 0.4–1.6)
ALP SERPL-CCNC: 102 U/L (ref 50–136)
ALT SERPL-CCNC: 37 U/L (ref 12–65)
ANION GAP SERPL CALC-SCNC: 5 MMOL/L (ref 2–11)
APPEARANCE UR: CLEAR
AST SERPL-CCNC: 39 U/L (ref 15–37)
BACTERIA URNS QL MICRO: NEGATIVE /HPF
BASOPHILS # BLD: 0 K/UL (ref 0–0.2)
BASOPHILS NFR BLD: 1 % (ref 0–2)
BILIRUB SERPL-MCNC: 1.1 MG/DL (ref 0.2–1.1)
BILIRUB UR QL: NEGATIVE
BUN SERPL-MCNC: 21 MG/DL (ref 8–23)
CALCIUM SERPL-MCNC: 9.7 MG/DL (ref 8.3–10.4)
CASTS URNS QL MICRO: ABNORMAL /LPF
CHLORIDE SERPL-SCNC: 104 MMOL/L (ref 101–110)
CO2 SERPL-SCNC: 27 MMOL/L (ref 21–32)
COLOR UR: ABNORMAL
CREAT SERPL-MCNC: 1.3 MG/DL (ref 0.6–1)
DIFFERENTIAL METHOD BLD: ABNORMAL
EOSINOPHIL # BLD: 0.1 K/UL (ref 0–0.8)
EOSINOPHIL NFR BLD: 1 % (ref 0.5–7.8)
EPI CELLS #/AREA URNS HPF: ABNORMAL /HPF
ERYTHROCYTE [DISTWIDTH] IN BLOOD BY AUTOMATED COUNT: 15.2 % (ref 11.9–14.6)
GLOBULIN SER CALC-MCNC: 4.4 G/DL (ref 2.8–4.5)
GLUCOSE SERPL-MCNC: 184 MG/DL (ref 65–100)
GLUCOSE UR STRIP.AUTO-MCNC: 100 MG/DL
HCT VFR BLD AUTO: 38.5 % (ref 35.8–46.3)
HGB BLD-MCNC: 12.3 G/DL (ref 11.7–15.4)
HGB UR QL STRIP: NEGATIVE
IMM GRANULOCYTES # BLD AUTO: 0 K/UL (ref 0–0.5)
IMM GRANULOCYTES NFR BLD AUTO: 0 % (ref 0–5)
KETONES UR QL STRIP.AUTO: NEGATIVE MG/DL
LEUKOCYTE ESTERASE UR QL STRIP.AUTO: ABNORMAL
LYMPHOCYTES # BLD: 1.3 K/UL (ref 0.5–4.6)
LYMPHOCYTES NFR BLD: 20 % (ref 13–44)
MAGNESIUM SERPL-MCNC: 2.3 MG/DL (ref 1.8–2.4)
MCH RBC QN AUTO: 29.7 PG (ref 26.1–32.9)
MCHC RBC AUTO-ENTMCNC: 31.9 G/DL (ref 31.4–35)
MCV RBC AUTO: 93 FL (ref 82–102)
MONOCYTES # BLD: 0.3 K/UL (ref 0.1–1.3)
MONOCYTES NFR BLD: 5 % (ref 4–12)
NEUTS SEG # BLD: 4.6 K/UL (ref 1.7–8.2)
NEUTS SEG NFR BLD: 73 % (ref 43–78)
NITRITE UR QL STRIP.AUTO: NEGATIVE
NRBC # BLD: 0 K/UL (ref 0–0.2)
PH UR STRIP: 8.5 (ref 5–9)
PLATELET # BLD AUTO: 212 K/UL (ref 150–450)
PMV BLD AUTO: 9.8 FL (ref 9.4–12.3)
POTASSIUM SERPL-SCNC: 4.4 MMOL/L (ref 3.5–5.1)
PROT SERPL-MCNC: 8.8 G/DL (ref 6.3–8.2)
PROT UR STRIP-MCNC: 30 MG/DL
RBC # BLD AUTO: 4.14 M/UL (ref 4.05–5.2)
RBC #/AREA URNS HPF: ABNORMAL /HPF
SODIUM SERPL-SCNC: 136 MMOL/L (ref 133–143)
SP GR UR REFRACTOMETRY: 1.01 (ref 1–1.02)
TROPONIN I SERPL HS-MCNC: 13.2 PG/ML (ref 0–14)
UROBILINOGEN UR QL STRIP.AUTO: 0.2 EU/DL (ref 0.2–1)
WBC # BLD AUTO: 6.4 K/UL (ref 4.3–11.1)
WBC URNS QL MICRO: ABNORMAL /HPF

## 2023-03-14 PROCEDURE — 71045 X-RAY EXAM CHEST 1 VIEW: CPT

## 2023-03-14 PROCEDURE — 6360000002 HC RX W HCPCS: Performed by: STUDENT IN AN ORGANIZED HEALTH CARE EDUCATION/TRAINING PROGRAM

## 2023-03-14 PROCEDURE — 84484 ASSAY OF TROPONIN QUANT: CPT

## 2023-03-14 PROCEDURE — 85025 COMPLETE CBC W/AUTO DIFF WBC: CPT

## 2023-03-14 PROCEDURE — 99285 EMERGENCY DEPT VISIT HI MDM: CPT

## 2023-03-14 PROCEDURE — 73060 X-RAY EXAM OF HUMERUS: CPT

## 2023-03-14 PROCEDURE — 70450 CT HEAD/BRAIN W/O DYE: CPT

## 2023-03-14 PROCEDURE — 93005 ELECTROCARDIOGRAM TRACING: CPT | Performed by: STUDENT IN AN ORGANIZED HEALTH CARE EDUCATION/TRAINING PROGRAM

## 2023-03-14 PROCEDURE — 81001 URINALYSIS AUTO W/SCOPE: CPT

## 2023-03-14 PROCEDURE — 96374 THER/PROPH/DIAG INJ IV PUSH: CPT

## 2023-03-14 PROCEDURE — 96375 TX/PRO/DX INJ NEW DRUG ADDON: CPT

## 2023-03-14 PROCEDURE — 83735 ASSAY OF MAGNESIUM: CPT

## 2023-03-14 PROCEDURE — 80053 COMPREHEN METABOLIC PANEL: CPT

## 2023-03-14 PROCEDURE — 72125 CT NECK SPINE W/O DYE: CPT

## 2023-03-14 RX ORDER — ONDANSETRON 2 MG/ML
4 INJECTION INTRAMUSCULAR; INTRAVENOUS
Status: COMPLETED | OUTPATIENT
Start: 2023-03-14 | End: 2023-03-14

## 2023-03-14 RX ORDER — MORPHINE SULFATE 4 MG/ML
2 INJECTION INTRAVENOUS ONCE
Status: COMPLETED | OUTPATIENT
Start: 2023-03-14 | End: 2023-03-14

## 2023-03-14 RX ADMIN — MORPHINE SULFATE 2 MG: 4 INJECTION, SOLUTION INTRAMUSCULAR; INTRAVENOUS at 23:39

## 2023-03-14 RX ADMIN — ONDANSETRON 4 MG: 2 INJECTION INTRAMUSCULAR; INTRAVENOUS at 23:39

## 2023-03-14 ASSESSMENT — PAIN SCALES - GENERAL: PAINLEVEL_OUTOF10: 10

## 2023-03-14 ASSESSMENT — PAIN DESCRIPTION - ORIENTATION: ORIENTATION: RIGHT

## 2023-03-14 ASSESSMENT — PAIN DESCRIPTION - LOCATION: LOCATION: ARM

## 2023-03-15 ENCOUNTER — TELEPHONE (OUTPATIENT)
Dept: ORTHOPEDIC SURGERY | Age: 88
End: 2023-03-15

## 2023-03-15 VITALS
HEIGHT: 65 IN | RESPIRATION RATE: 16 BRPM | TEMPERATURE: 98.5 F | HEART RATE: 60 BPM | SYSTOLIC BLOOD PRESSURE: 137 MMHG | DIASTOLIC BLOOD PRESSURE: 81 MMHG | OXYGEN SATURATION: 96 % | WEIGHT: 135 LBS | BODY MASS INDEX: 22.49 KG/M2

## 2023-03-15 LAB
EKG ATRIAL RATE: 60 BPM
EKG DIAGNOSIS: NORMAL
EKG P AXIS: 14 DEGREES
EKG P-R INTERVAL: 110 MS
EKG Q-T INTERVAL: 433 MS
EKG QRS DURATION: 94 MS
EKG QTC CALCULATION (BAZETT): 433 MS
EKG R AXIS: -6 DEGREES
EKG T AXIS: -30 DEGREES
EKG VENTRICULAR RATE: 60 BPM

## 2023-03-15 PROCEDURE — 6370000000 HC RX 637 (ALT 250 FOR IP): Performed by: STUDENT IN AN ORGANIZED HEALTH CARE EDUCATION/TRAINING PROGRAM

## 2023-03-15 RX ORDER — DOCUSATE SODIUM 100 MG/1
100 CAPSULE, LIQUID FILLED ORAL 2 TIMES DAILY
Qty: 20 CAPSULE | Refills: 0 | Status: SHIPPED | OUTPATIENT
Start: 2023-03-15 | End: 2023-03-25

## 2023-03-15 RX ORDER — ONDANSETRON 4 MG/1
4 TABLET, FILM COATED ORAL 3 TIMES DAILY PRN
Qty: 15 TABLET | Refills: 2 | Status: SHIPPED | OUTPATIENT
Start: 2023-03-15

## 2023-03-15 RX ORDER — OXYCODONE HYDROCHLORIDE 5 MG/1
5 TABLET ORAL EVERY 6 HOURS PRN
Qty: 8 TABLET | Refills: 0 | Status: SHIPPED | OUTPATIENT
Start: 2023-03-15 | End: 2023-03-20

## 2023-03-15 RX ORDER — HYDROCODONE BITARTRATE AND ACETAMINOPHEN 7.5; 325 MG/1; MG/1
1 TABLET ORAL
Status: COMPLETED | OUTPATIENT
Start: 2023-03-15 | End: 2023-03-15

## 2023-03-15 RX ADMIN — HYDROCODONE BITARTRATE AND ACETAMINOPHEN 1 TABLET: 7.5; 325 TABLET ORAL at 01:28

## 2023-03-15 NOTE — ED TRIAGE NOTES
Pt arrived via EMS from home, pt said she had an episode of vertigo and fell from standing landing on R shoulder. Pt denies hitting head, no LOC, no blood thinners. GCS 14.

## 2023-03-15 NOTE — ED NOTES
Arranged for Narcisa Presume to transport patient home.  ETA: 2:00 AM.     Burman Bloch A Kautzer  03/15/23 0053

## 2023-03-15 NOTE — DISCHARGE INSTRUCTIONS
Utilize a sling for comfort as prescribed. X-ray imaging confirms evidence of a right-sided humeral fracture for which she will need to see an orthopedic provider outpatient. This referral has been arranged. Please call the office listed to confirm details of your appointment. Use the medication as prescribed for pain control. Return for worsening symptoms, concerns or questions.

## 2023-03-15 NOTE — TELEPHONE ENCOUNTER
Called to schedule ER follow up with Dr. Lesley Henry for Right Humerus Fracture. Patient's son answered and said, they don't hava car or any way to get to appointment. The will call back when they have transportation.

## 2023-03-15 NOTE — ED NOTES
I have reviewed discharge instructions with the caregiver.  The caregiver verbalized understanding.    Patient left ED via Discharge Method: stretcher to Home with [facility].    Opportunity for questions and clarification provided.       Patient given 3 scripts.         To continue your aftercare when you leave the hospital, you may receive an automated call from our care team to check in on how you are doing.  This is a free service and part of our promise to provide the best care and service to meet your aftercare needs.” If you have questions, or wish to unsubscribe from this service please call [phone].  Thank you for Choosing our [facility] Emergency Department.    mandy Gu RN  03/15/23 0348

## 2023-03-15 NOTE — ED PROVIDER NOTES
Emergency Department Provider Note                   PCP:                DEANDRA Landeros NP               Age: 81 y.o. Sex: female     DISPOSITION Decision To Discharge 03/15/2023 12:30:03 AM       ICD-10-CM    1. Closed fracture of neck of right humerus, initial encounter  S42.211A oxyCODONE (ROXICODONE) 5 MG immediate release tablet          MEDICAL DECISION MAKING  Complexity of Problems Addressed:  1 or more acute illnesses that pose a threat to life or bodily function. Data Reviewed and Analyzed:  Category 1:   I reviewed records from an external source: ED records from outside this hospital.  I reviewed records from an external source: provider visit notes from PCP. I ordered each unique test.  I reviewed the results of each unique test.    The patients assessment required an independent historian: EMS personnel at bedside  Discussed with patient's son with whom she resides over the phone    Category 2:   I independently ordered and reviewed the EKG. I independently ordered and reviewed the X-rays. Chest x-ray, right humeral films  I independently ordered and reviewed the labs    Category 3: Discussion of management or test interpretation. This is a 73-year-old female patient suffered a fall from standing height earlier today secondary to dizziness. History of vertigo, patient states \"I am always dizzy\". She exhibits some mild confusion with date and specifics of her current location. Unclear if this is patient baseline or not. No family at bedside. Orders placed for basic labs, EKG, chest x-ray, humerus, CT head. ED Course as of 03/15/23 0030   Wed Mar 15, 2023   0016 Spoke with patient's son with whom she resides. He confirms that patient does not fact have a history of dementia. He states she is at baseline mentation. He was at home when the fall occurred and heard the event.   He states that he found her on the floor, awake, alert and complaining of severe pain in her right shoulder. [BR]   0016 Patient's labs thus far appear stable. She does have an impacted fracture of the proximal right humerus. We will plan to place in a sling, treat pain and discharge if remaining imaging is stable [BR]   0017 . [BR]      ED Course User Index  [BR] Arnoldo Garza DO       Risk of Complications and/or Morbidity of Patient Management:  Patient was discharged risks and benefits of hospitalization were considered, Discussion with external consultants, and Diagnosis or care significantly limited by social determinants of health history of dementia, lives at home with son      Greta Bello is a 81 y.o. female who presents to the Emergency Department with chief complaint of    Chief Complaint   Patient presents with    Shoulder Injury      81year-old female patient presenting this department via EMS with reports of severe shoulder pain after a fall from standing height at home. EMS reports that patient suffered an episode of vertigo causing her to fall. Patient states she does not exactly remember why she fell but states she is always dizzy. She reports only extensive history of vertigo treated with medication given by her son. She states she was ambulating from the restroom when she lost her balance and fell, landing on her right shoulder. She reports severe pain over the right shoulder as well. EMS reports some confusion leaving patient a GCS of 14. This is apparently abnormal for patient per her report. No family at bedside to confirm. The history is provided by the patient and the EMS personnel. No  was used. Review of Systems   Unable to perform ROS: Mental status change   Musculoskeletal:  Positive for arthralgias, joint swelling and myalgias. Neurological:  Positive for dizziness. Vitals signs and nursing note reviewed.    Patient Vitals for the past 4 hrs:   Temp Pulse Resp BP SpO2   03/14/23 2340 -- 60 13 (!) 164/89 --   03/14/23 2324 -- 60 17 -- --   03/14/23 2322 -- 60 17 -- --   03/14/23 2232 98.5 °F (36.9 °C) 60 18 (!) 163/99 100 %          Physical Exam  Vitals and nursing note reviewed. Constitutional:       General: She is not in acute distress. Appearance: Normal appearance. She is normal weight. She is not ill-appearing or toxic-appearing. Comments: Early female patient, appears uncomfortable, alert and oriented x3, confused with date, specifics of current location. No acute distress, speaks in clear, fluid sentences. HENT:      Head: Normocephalic and atraumatic. Right Ear: External ear normal.      Left Ear: External ear normal.      Nose: Nose normal.      Mouth/Throat:      Mouth: Mucous membranes are moist.   Eyes:      General: No scleral icterus. Right eye: No discharge. Left eye: No discharge. Extraocular Movements: Extraocular movements intact. Cardiovascular:      Rate and Rhythm: Normal rate and regular rhythm. Pulses: Normal pulses. Heart sounds: Normal heart sounds. Pulmonary:      Effort: Pulmonary effort is normal. No tachypnea, bradypnea, accessory muscle usage, prolonged expiration or respiratory distress. Breath sounds: Normal breath sounds and air entry. No stridor. No decreased breath sounds, wheezing, rhonchi or rales. Abdominal:      General: Abdomen is flat. There is no distension. Palpations: There is no mass. Tenderness: There is no abdominal tenderness. There is no right CVA tenderness, left CVA tenderness, guarding or rebound. Negative signs include Hinds's sign and McBurney's sign. Hernia: No hernia is present. Musculoskeletal:         General: No swelling, tenderness or deformity. Normal range of motion. Cervical back: Normal range of motion. Comments: Prominence and pain to the proximal aspect of the right humerus. Distal pulses are palpated and intact to remove the ulnar and radial aspect.     Superficial abrasion noted to patient's elbow on the right side. No deformity of elbow, wrist or hand. Skin:     General: Skin is warm. Capillary Refill: Capillary refill takes less than 2 seconds. Neurological:      General: No focal deficit present. Mental Status: She is alert. Psychiatric:         Mood and Affect: Mood normal.        Procedures     Orders Placed This Encounter   Procedures    XR CHEST PORTABLE    XR HUMERUS RIGHT (MIN 2 VIEWS)    CT HEAD WO CONTRAST    CT CERVICAL SPINE WO CONTRAST    CBC with Auto Differential    Comprehensive Metabolic Panel    Troponin    Magnesium    Urinalysis    EKG 12 Lead    [employer] Sling and Swathe, Right        Medications   morphine injection 2 mg (2 mg IntraVENous Given 3/14/23 2339)   ondansetron (ZOFRAN) injection 4 mg (4 mg IntraVENous Given 3/14/23 2339)       New Prescriptions    DOCUSATE SODIUM (COLACE) 100 MG CAPSULE    Take 1 capsule by mouth 2 times daily for 10 days    ONDANSETRON (ZOFRAN) 4 MG TABLET    Take 1 tablet by mouth 3 times daily as needed for Nausea or Vomiting    OXYCODONE (ROXICODONE) 5 MG IMMEDIATE RELEASE TABLET    Take 1 tablet by mouth every 6 hours as needed for Pain for up to 5 days. Intended supply: 3 days. 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 Dr. Javon Langston        No family history on file.      Social History     Socioeconomic History    Marital status: Single   Tobacco Use    Smoking status: Never    Smokeless tobacco: Never   Substance and Sexual Activity    Alcohol use: Never    Drug use: Never   Social History Narrative    ** Merged History Encounter **             Allergies: Patient has no known allergies. Previous Medications    ACETAMINOPHEN (TYLENOL) 325 MG TABLET    Take 325 mg by mouth    ASPIRIN 81 MG CHEWABLE TABLET    Take 1 tablet by mouth daily    ATORVASTATIN (LIPITOR) 80 MG TABLET    Take 80 mg by mouth    CARVEDILOL (COREG) 6.25 MG TABLET    Take 1 tablet by mouth 2 times daily (with meals)    CLOPIDOGREL (PLAVIX) 75 MG TABLET    Take 1 tablet by mouth daily    IMATINIB (GLEEVEC) 400 MG CHEMO TABLET    Take 400 mg by mouth    LACTULOSE (CHRONULAC) 10 GM/15ML SOLUTION    Take 10 g by mouth    LEVOTHYROXINE (SYNTHROID) 100 MCG TABLET    Take by mouth every morning (before breakfast)    LISINOPRIL (PRINIVIL;ZESTRIL) 10 MG TABLET    Take by mouth daily    MAGNESIUM OXIDE (MAG-OX) 400 (240 MG) MG TABLET    Take 400 mg by mouth    MECLIZINE (ANTIVERT) 25 MG TABLET    Take by mouth 3 times daily as needed    MEGESTROL (MEGACE) 40 MG TABLET    TAKE 1 TABLET BY MOUTH EVERY DAY    METFORMIN (GLUCOPHAGE) 500 MG TABLET    Take 500 mg by mouth    NITROGLYCERIN (NITROSTAT) 0.4 MG SL TABLET    Place 1 tablet under the tongue every 5 minutes as needed for Chest pain up to max of 3 total doses. If no relief after 1 dose, call 911. OFLOXACIN (OCUFLOX) 0.3 % SOLUTION    Apply 2 drops to eye 4 times daily    PANTOPRAZOLE (PROTONIX) 40 MG TABLET    Take 1 tablet by mouth every morning (before breakfast)    SENNA-DOCUSATE (PERICOLACE) 8.6-50 MG PER TABLET    Take 2 tablets by mouth        Results for orders placed or performed during the hospital encounter of 03/14/23   XR CHEST PORTABLE    Narrative    Examination: AP view of the chest    Indication: Dizziness, fall    Comparison: 20/7/2021    Findings:  A dual-lead pacing device is present in the left chest wall.  The   cardiomediastinal silhouette is within normal size limits. Thoracic aortic   calcifications are present. Minimal atelectatic changes are present in the perihilar left lung. No focal   consolidative airspace disease, sizable pleural fluid accumulation or   pneumothorax is identified. There is an impacted fracture of the right proximal humerus. Impression    Impression:  1. Impacted fracture of the right proximal humerus. 2. No evidence of an acute pleural or pulmonary parenchymal abnormality. Arleen Pang M.D.   3/14/2023 11:48:00 PM   XR HUMERUS RIGHT (MIN 2 VIEWS)    Narrative    Examination: XR HUMERUS RIGHT (MIN 2 VIEWS)    Indication: Pain after fall    Comparison: No prior study is available for comparison. Findings: There is an impacted acute fracture of the right proximal humerus, the fracture   likely across the surgical neck. No dislocation or radiopaque foreign body is   seen. Impression    Impression:  Impacted acute fracture of the proximal humerus, likely at the surgical neck. Arleen Pang M.D.   3/14/2023 11:01:00 PM   CT HEAD WO CONTRAST    Narrative    EXAM: CT HEAD WO CONTRAST, CT CERVICAL SPINE WO CONTRAST    EXAM DATE: 3/14/2023 11:29 PM    INDICATION: Trauma. COMPARISON: 4/27/2021 . TECHNIQUE:  Images through the head without intravenous contrast.    Images through the cervical spine without intravenous contrast.    Low-dose CT acquisition technique included one or more of the following options:   Automated exposure control; Adjustment of mA and/or KV according to patient's   size; Use of iterative reconstruction. CONTRAST:  None. FINDINGS  TECHNICAL: Motion degraded examination. SUPPORT LINES / DEVICES:   Implantable cardiac device partially visualized. HEAD CT:  INTRACRANIAL CONTENTS:  No acute intracranial hemorrhage. No intracranial mass. No acute large territorial infarct. Mild hypodensity within the supratentorial white matter.  Increased compared to 4/27/2021    Ventricles, sulci, and cisterns prominent due to moderate volume loss. Progressed since 4/27/2021. SKULL: No acute abnormality. PARANASAL SINUSES: Essentially clear. ORBITS: Within normal limits. MASTOID AIR CELLS: No significant opacification. INTRACRANIAL VESSELS: Calcification of the intracranial internal carotid and   vertebral arteries. CERVICAL SPINE CT:   VERTEBRAE: No acute fracture. SPINAL ALIGNMENT: Within normal limits. SPINAL CANAL: No gross intraspinal collection, mass, or other abnormality. INTERVERTEBRAL DISCS: Multilevel intervertebral disc height loss with   hypertrophic endplate change. SPINAL CANAL STENOSIS: Most prominent at C5-6, C6-7 (mild/moderate). NEURAL FORAMINAL NARROWING: Most prominent at left C5-6 (severe). SOFT TISSUES: No significant abnormality. OTHER SIGNIFICANT FINDINGS: Acute, comminuted fracture of the proximal right   humerus. THYROID: Atrophic. Impression    HEAD CT:  No acute intracranial hemorrhage. No acute fracture. Mild white matter changes, nonspecific but commonly due to chronic   microangiopathic change. Increased compared to 4/27/2021. CERVICAL SPINE CT:  Chronic-appearing changes of the spine without acute fracture or subluxation. OTHER FINDINGS:  Acute, comminuted fracture of the proximal right humerus. Nory Russell M.D.   3/15/2023 12:22:00 AM   CT CERVICAL SPINE WO CONTRAST    Narrative    EXAM: CT HEAD WO CONTRAST, CT CERVICAL SPINE WO CONTRAST    EXAM DATE: 3/14/2023 11:29 PM    INDICATION: Trauma. COMPARISON: 4/27/2021 . TECHNIQUE:  Images through the head without intravenous contrast.    Images through the cervical spine without intravenous contrast.    Low-dose CT acquisition technique included one or more of the following options:   Automated exposure control;  Adjustment of mA and/or KV according to patient's   size; Use of iterative reconstruction. CONTRAST:  None. FINDINGS  TECHNICAL: Motion degraded examination. SUPPORT LINES / DEVICES:   Implantable cardiac device partially visualized. HEAD CT:  INTRACRANIAL CONTENTS:  No acute intracranial hemorrhage. No intracranial mass. No acute large territorial infarct. Mild hypodensity within the supratentorial white matter. Increased compared to   4/27/2021    Ventricles, sulci, and cisterns prominent due to moderate volume loss. Progressed since 4/27/2021. SKULL: No acute abnormality. PARANASAL SINUSES: Essentially clear. ORBITS: Within normal limits. MASTOID AIR CELLS: No significant opacification. INTRACRANIAL VESSELS: Calcification of the intracranial internal carotid and   vertebral arteries. CERVICAL SPINE CT:   VERTEBRAE: No acute fracture. SPINAL ALIGNMENT: Within normal limits. SPINAL CANAL: No gross intraspinal collection, mass, or other abnormality. INTERVERTEBRAL DISCS: Multilevel intervertebral disc height loss with   hypertrophic endplate change. SPINAL CANAL STENOSIS: Most prominent at C5-6, C6-7 (mild/moderate). NEURAL FORAMINAL NARROWING: Most prominent at left C5-6 (severe). SOFT TISSUES: No significant abnormality. OTHER SIGNIFICANT FINDINGS: Acute, comminuted fracture of the proximal right   humerus. THYROID: Atrophic. Impression    HEAD CT:  No acute intracranial hemorrhage. No acute fracture. Mild white matter changes, nonspecific but commonly due to chronic   microangiopathic change. Increased compared to 4/27/2021. CERVICAL SPINE CT:  Chronic-appearing changes of the spine without acute fracture or subluxation. OTHER FINDINGS:  Acute, comminuted fracture of the proximal right humerus.      Nory Russell M.D.   3/15/2023 12:22:00 AM   CBC with Auto Differential   Result Value Ref Range    WBC 6.4 4.3 - 11.1 K/uL    RBC 4.14 4.05 - 5.2 M/uL    Hemoglobin 12.3 11.7 - 15.4 g/dL    Hematocrit 38.5 35.8 - 46.3 %    MCV 93.0 82 - 102 FL    MCH 29.7 26.1 - 32.9 PG    MCHC 31.9 31.4 - 35.0 g/dL    RDW 15.2 (H) 11.9 - 14.6 %    Platelets 595 745 - 206 K/uL    MPV 9.8 9.4 - 12.3 FL    nRBC 0.00 0.0 - 0.2 K/uL    Differential Type AUTOMATED      Seg Neutrophils 73 43 - 78 %    Lymphocytes 20 13 - 44 %    Monocytes 5 4.0 - 12.0 %    Eosinophils % 1 0.5 - 7.8 %    Basophils 1 0.0 - 2.0 %    Immature Granulocytes 0 0.0 - 5.0 %    Segs Absolute 4.6 1.7 - 8.2 K/UL    Absolute Lymph # 1.3 0.5 - 4.6 K/UL    Absolute Mono # 0.3 0.1 - 1.3 K/UL    Absolute Eos # 0.1 0.0 - 0.8 K/UL    Basophils Absolute 0.0 0.0 - 0.2 K/UL    Absolute Immature Granulocyte 0.0 0.0 - 0.5 K/UL   Comprehensive Metabolic Panel   Result Value Ref Range    Sodium 136 133 - 143 mmol/L    Potassium 4.4 3.5 - 5.1 mmol/L    Chloride 104 101 - 110 mmol/L    CO2 27 21 - 32 mmol/L    Anion Gap 5 2 - 11 mmol/L    Glucose 184 (H) 65 - 100 mg/dL    BUN 21 8 - 23 MG/DL    Creatinine 1.30 (H) 0.6 - 1.0 MG/DL    Est, Glom Filt Rate 40 (L) >60 ml/min/1.73m2    Calcium 9.7 8.3 - 10.4 MG/DL    Total Bilirubin 1.1 0.2 - 1.1 MG/DL    ALT 37 12 - 65 U/L    AST 39 (H) 15 - 37 U/L    Alk Phosphatase 102 50 - 136 U/L    Total Protein 8.8 (H) 6.3 - 8.2 g/dL    Albumin 4.4 3.2 - 4.6 g/dL    Globulin 4.4 2.8 - 4.5 g/dL    Albumin/Globulin Ratio 1.0 0.4 - 1.6     Troponin   Result Value Ref Range    Troponin, High Sensitivity 13.2 0 - 14 pg/mL   Magnesium   Result Value Ref Range    Magnesium 2.3 1.8 - 2.4 mg/dL   Urinalysis   Result Value Ref Range    Color, UA YELLOW/STRAW      Appearance CLEAR      Specific Gravity, UA 1.014 1.001 - 1.023      pH, Urine 8.5 5.0 - 9.0      Protein, UA 30 (A) NEG mg/dL    Glucose,  mg/dL    Ketones, Urine Negative NEG mg/dL    Bilirubin Urine Negative NEG      Blood, Urine Negative NEG      Urobilinogen, Urine 0.2 0.2 - 1.0 EU/dL    Nitrite, Urine Negative NEG      Leukocyte Esterase, Urine TRACE (A) NEG      WBC, UA 5-10 (A) U4 /hpf    RBC, UA 0-5 U5 /hpf    Epithelial Cells UA 0-5 U5 /hpf    BACTERIA, URINE Negative NEG /hpf    Casts 0-2 U2 /lpf   EKG 12 Lead   Result Value Ref Range    Ventricular Rate 60 BPM    Atrial Rate 60 BPM    P-R Interval 110 ms    QRS Duration 94 ms    Q-T Interval 433 ms    QTc Calculation (Bazett) 433 ms    P Axis 14 degrees    R Axis -6 degrees    T Axis -30 degrees    Diagnosis Sinus rhythm         CT HEAD WO CONTRAST   Final Result   HEAD CT:   No acute intracranial hemorrhage. No acute fracture. Mild white matter changes, nonspecific but commonly due to chronic    microangiopathic change. Increased compared to 4/27/2021. CERVICAL SPINE CT:   Chronic-appearing changes of the spine without acute fracture or subluxation. OTHER FINDINGS:   Acute, comminuted fracture of the proximal right humerus. Jacki Henson M.D.    3/15/2023 12:22:00 AM      CT CERVICAL SPINE WO CONTRAST   Final Result   HEAD CT:   No acute intracranial hemorrhage. No acute fracture. Mild white matter changes, nonspecific but commonly due to chronic    microangiopathic change. Increased compared to 4/27/2021. CERVICAL SPINE CT:   Chronic-appearing changes of the spine without acute fracture or subluxation. OTHER FINDINGS:   Acute, comminuted fracture of the proximal right humerus. Jacki Henson M.D.    3/15/2023 12:22:00 AM      XR CHEST PORTABLE   Final Result   Impression:   1. Impacted fracture of the right proximal humerus. 2. No evidence of an acute pleural or pulmonary parenchymal abnormality. Arleen Pang M.D.    3/14/2023 11:48:00 PM      XR HUMERUS RIGHT (MIN 2 VIEWS)   Final Result   Impression:   Impacted acute fracture of the proximal humerus, likely at the surgical neck. Arleen Pang M.D.    3/14/2023 11:01:00 PM                        Voice dictation software was used during the making of this note.   This software is not perfect and grammatical and other typographical errors may be present. This note has not been completely proofread for errors.         [address] [facility],   03/15/23 0031

## 2023-03-18 ENCOUNTER — HOSPITAL ENCOUNTER (EMERGENCY)
Age: 88
Discharge: HOME | End: 2023-03-18
Attending: EMERGENCY MEDICINE
Payer: MEDICARE

## 2023-03-18 ENCOUNTER — APPOINTMENT (OUTPATIENT)
Dept: CT IMAGING | Age: 88
End: 2023-03-18
Payer: MEDICARE

## 2023-03-18 ENCOUNTER — APPOINTMENT (OUTPATIENT)
Dept: GENERAL RADIOLOGY | Age: 88
End: 2023-03-18
Payer: MEDICARE

## 2023-03-18 VITALS
HEIGHT: 65 IN | RESPIRATION RATE: 16 BRPM | OXYGEN SATURATION: 96 % | SYSTOLIC BLOOD PRESSURE: 168 MMHG | BODY MASS INDEX: 22.49 KG/M2 | WEIGHT: 135 LBS | DIASTOLIC BLOOD PRESSURE: 74 MMHG | HEART RATE: 60 BPM | TEMPERATURE: 98.4 F

## 2023-03-18 DIAGNOSIS — F03.90 DEMENTIA, UNSPECIFIED DEMENTIA SEVERITY, UNSPECIFIED DEMENTIA TYPE, UNSPECIFIED WHETHER BEHAVIORAL, PSYCHOTIC, OR MOOD DISTURBANCE OR ANXIETY (HCC): ICD-10-CM

## 2023-03-18 DIAGNOSIS — R52 UNCONTROLLED PAIN: Primary | ICD-10-CM

## 2023-03-18 DIAGNOSIS — S42.294A OTHER CLOSED NONDISPLACED FRACTURE OF PROXIMAL END OF RIGHT HUMERUS, INITIAL ENCOUNTER: ICD-10-CM

## 2023-03-18 LAB
ALBUMIN SERPL-MCNC: 3.8 G/DL (ref 3.2–4.6)
ALBUMIN/GLOB SERPL: 0.8 (ref 0.4–1.6)
ALP SERPL-CCNC: 82 U/L (ref 50–136)
ALT SERPL-CCNC: 29 U/L (ref 12–65)
ANION GAP SERPL CALC-SCNC: 4 MMOL/L (ref 2–11)
APPEARANCE UR: CLEAR
AST SERPL-CCNC: 30 U/L (ref 15–37)
BACTERIA URNS QL MICRO: 0 /HPF
BASOPHILS # BLD: 0 K/UL (ref 0–0.2)
BASOPHILS NFR BLD: 0 % (ref 0–2)
BILIRUB SERPL-MCNC: 2.1 MG/DL (ref 0.2–1.1)
BILIRUB UR QL: NEGATIVE
BUN SERPL-MCNC: 33 MG/DL (ref 8–23)
CALCIUM SERPL-MCNC: 9.4 MG/DL (ref 8.3–10.4)
CHLORIDE SERPL-SCNC: 107 MMOL/L (ref 101–110)
CO2 SERPL-SCNC: 30 MMOL/L (ref 21–32)
COLOR UR: ABNORMAL
CREAT SERPL-MCNC: 1.3 MG/DL (ref 0.6–1)
DIFFERENTIAL METHOD BLD: ABNORMAL
EKG ATRIAL RATE: 65 BPM
EKG DIAGNOSIS: NORMAL
EKG P AXIS: 235 DEGREES
EKG P-R INTERVAL: 123 MS
EKG Q-T INTERVAL: 406 MS
EKG QRS DURATION: 92 MS
EKG QTC CALCULATION (BAZETT): 423 MS
EKG R AXIS: -26 DEGREES
EKG T AXIS: -5 DEGREES
EKG VENTRICULAR RATE: 65 BPM
EOSINOPHIL # BLD: 0 K/UL (ref 0–0.8)
EOSINOPHIL NFR BLD: 0 % (ref 0.5–7.8)
EPI CELLS #/AREA URNS HPF: ABNORMAL /HPF
ERYTHROCYTE [DISTWIDTH] IN BLOOD BY AUTOMATED COUNT: 14.9 % (ref 11.9–14.6)
GLOBULIN SER CALC-MCNC: 4.5 G/DL (ref 2.8–4.5)
GLUCOSE SERPL-MCNC: 188 MG/DL (ref 65–100)
GLUCOSE UR STRIP.AUTO-MCNC: 100 MG/DL
HCT VFR BLD AUTO: 34.5 % (ref 35.8–46.3)
HGB BLD-MCNC: 11 G/DL (ref 11.7–15.4)
HGB UR QL STRIP: NEGATIVE
IMM GRANULOCYTES # BLD AUTO: 0.1 K/UL (ref 0–0.5)
IMM GRANULOCYTES NFR BLD AUTO: 1 % (ref 0–5)
KETONES UR QL STRIP.AUTO: 15 MG/DL
LACTATE SERPL-SCNC: 1.5 MMOL/L (ref 0.4–2)
LEUKOCYTE ESTERASE UR QL STRIP.AUTO: NEGATIVE
LYMPHOCYTES # BLD: 1.2 K/UL (ref 0.5–4.6)
LYMPHOCYTES NFR BLD: 10 % (ref 13–44)
MAGNESIUM SERPL-MCNC: 2.5 MG/DL (ref 1.8–2.4)
MCH RBC QN AUTO: 30.1 PG (ref 26.1–32.9)
MCHC RBC AUTO-ENTMCNC: 31.9 G/DL (ref 31.4–35)
MCV RBC AUTO: 94.3 FL (ref 82–102)
MONOCYTES # BLD: 0.7 K/UL (ref 0.1–1.3)
MONOCYTES NFR BLD: 6 % (ref 4–12)
NEUTS SEG # BLD: 9.9 K/UL (ref 1.7–8.2)
NEUTS SEG NFR BLD: 83 % (ref 43–78)
NITRITE UR QL STRIP.AUTO: NEGATIVE
NRBC # BLD: 0 K/UL (ref 0–0.2)
PH UR STRIP: 5 (ref 5–9)
PLATELET # BLD AUTO: 245 K/UL (ref 150–450)
PMV BLD AUTO: 10.4 FL (ref 9.4–12.3)
POTASSIUM SERPL-SCNC: 4 MMOL/L (ref 3.5–5.1)
PROCALCITONIN SERPL-MCNC: 0.16 NG/ML (ref 0–0.49)
PROT SERPL-MCNC: 8.3 G/DL (ref 6.3–8.2)
PROT UR STRIP-MCNC: 100 MG/DL
RBC # BLD AUTO: 3.66 M/UL (ref 4.05–5.2)
SODIUM SERPL-SCNC: 141 MMOL/L (ref 133–143)
SP GR UR REFRACTOMETRY: 1.03 (ref 1–1.02)
UROBILINOGEN UR QL STRIP.AUTO: 1 EU/DL (ref 0.2–1)
WBC # BLD AUTO: 11.8 K/UL (ref 4.3–11.1)
WBC URNS QL MICRO: ABNORMAL /HPF

## 2023-03-18 PROCEDURE — 85025 COMPLETE CBC W/AUTO DIFF WBC: CPT

## 2023-03-18 PROCEDURE — 93005 ELECTROCARDIOGRAM TRACING: CPT | Performed by: EMERGENCY MEDICINE

## 2023-03-18 PROCEDURE — 96374 THER/PROPH/DIAG INJ IV PUSH: CPT

## 2023-03-18 PROCEDURE — 84145 PROCALCITONIN (PCT): CPT

## 2023-03-18 PROCEDURE — 99285 EMERGENCY DEPT VISIT HI MDM: CPT

## 2023-03-18 PROCEDURE — 83735 ASSAY OF MAGNESIUM: CPT

## 2023-03-18 PROCEDURE — 70450 CT HEAD/BRAIN W/O DYE: CPT

## 2023-03-18 PROCEDURE — 71045 X-RAY EXAM CHEST 1 VIEW: CPT

## 2023-03-18 PROCEDURE — 6360000002 HC RX W HCPCS: Performed by: EMERGENCY MEDICINE

## 2023-03-18 PROCEDURE — 81001 URINALYSIS AUTO W/SCOPE: CPT

## 2023-03-18 PROCEDURE — 83605 ASSAY OF LACTIC ACID: CPT

## 2023-03-18 PROCEDURE — 80053 COMPREHEN METABOLIC PANEL: CPT

## 2023-03-18 RX ADMIN — FENTANYL CITRATE 25 MCG: 50 INJECTION INTRAMUSCULAR; INTRAVENOUS at 17:12

## 2023-03-18 ASSESSMENT — PAIN DESCRIPTION - LOCATION: LOCATION: SHOULDER

## 2023-03-18 ASSESSMENT — PAIN DESCRIPTION - ORIENTATION: ORIENTATION: RIGHT

## 2023-03-18 ASSESSMENT — PAIN SCALES - GENERAL: PAINLEVEL_OUTOF10: 10

## 2023-03-18 ASSESSMENT — LIFESTYLE VARIABLES
HOW MANY STANDARD DRINKS CONTAINING ALCOHOL DO YOU HAVE ON A TYPICAL DAY: PATIENT DOES NOT DRINK
HOW OFTEN DO YOU HAVE A DRINK CONTAINING ALCOHOL: NEVER

## 2023-03-18 ASSESSMENT — PAIN - FUNCTIONAL ASSESSMENT: PAIN_FUNCTIONAL_ASSESSMENT: 0-10

## 2023-03-18 NOTE — ED TRIAGE NOTES
Pt to ED from home. Pt sons called out for lift assist and states she is in too much pain. Pt was dx with broken arm 4 days ago. Pt was sent home with oxycodone. Son came in and found her in fall and they were unable to get her up from floor. Pt states she slid into floor. Pt confused in triage. Pt states she does not remember fall today. Pt complaining of right shoulder and arm pain. Pt denies chest pain, SOB, headaches, NVD. Pt states dizziness.

## 2023-03-18 NOTE — ED PROVIDER NOTES
Emergency Department Provider Note                   PCP:                DEANDRA Golden NP               Age: 81 y.o. Sex: female     DISPOSITION Decision To Discharge 03/18/2023 06:27:03 PM       ICD-10-CM    1. Uncontrolled pain  R52       2. Dementia, unspecified dementia severity, unspecified dementia type, unspecified whether behavioral, psychotic, or mood disturbance or anxiety ([facility] [address]  F03.90       3. Other closed nondisplaced fracture of proximal end of right humerus, initial encounter  [address]  Complexity of Problems Addressed:  1 or more acute illnesses that pose a threat to life or bodily function. Data Reviewed and Analyzed:  Category 1:   I reviewed records from an external source: provider visit notes from outside specialist.  I reviewed records from an external source: previous old EKG's reviewed. Per documentation from October when she was discharged from the hospital after a STEMI she was alert and oriented x3. Per documentation of her most recent ED visit she was confused and when the provider who saw her called family they stated this was her baseline. She had a CT of her head from her most recent fall while in the emergency department that was negative. Per discharge summary patient is supposed to be taking Plavix. I ordered each unique test.  I interpreted the results of each unique test.    The patients assessment required an independent historian: EMS, patient is unable to recall events that have recently taken place. Category 2:   ED EKG was independently interpreted in the absence of a cardiologist.  Rate: 65  EKG Interpretation: EKG Interpretation: sinus rhythm  ST Segments: Nonspecific ST segments - NO STEMI  No arrhythmia  Unchanged from previous    I independently ordered and interpreted the EKG. I independently ordered and interpreted the X-rays.   Right humerus fracture unchanged no infiltrate  I independently ordered and interpreted the CT Scan. No ICH    Category 3: Discussion of management or test interpretation. Elderly female sent for right arm pain with a known fracture. Per EMS she is also slightly altered. We called the family and patient is at baseline. The family member reports that she has not been wearing her sling because it was too small and that he did not  her pain medication that was prescribed several days ago. A sling and swath was applied that is better fitting. We discussed with the family member at the need to  her pain medication and give it to her as needed. Her lab work-up is negative for any indications of infection or sepsis. There are no CT findings to indicate an intracranial hemorrhage. Risk of Complications and/or Morbidity of Patient Management:  Parental controlled substances given in the ED     Is this patient to be included in the SEP-1 core measure due to severe sepsis or septic shock? No Exclusion criteria - the patient is NOT to be included for SEP-1 Core Measure due to: Infection is not suspected     Nish Deleon is a 81 y.o. female who presents to the Emergency Department with chief complaint of    Chief Complaint   Patient presents with    [place] with EMS for pain in her right arm. Patient recently fell and broke her right humerus. 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 Family is not at bedside. EMS reports family since patient to the hospital for pain control. Other than right arm pain patient denies having any pain anywhere else. She states she fell out of bed this morning but is unclear whether that happened or not. The history is provided by the patient and the EMS personnel.       Review of Systems    Vitals signs and nursing note reviewed:  Patient Vitals for the past 4 hrs:   Temp Pulse Resp BP SpO2 03/18/23 1800 -- 63 -- (!) 189/80 96 %   03/18/23 1618 98.4 °F (36.9 °C) 67 16 (!) 179/95 95 %          Physical Exam  Vitals and nursing note reviewed. Constitutional:       General: She is not in acute distress. Appearance: Normal appearance. She is obese. She is not ill-appearing. HENT:      Head: Normocephalic and atraumatic. Cardiovascular:      Rate and Rhythm: Normal rate and regular rhythm. Pulmonary:      Effort: Pulmonary effort is normal. No respiratory distress. Breath sounds: Normal breath sounds. No wheezing. Abdominal:      Palpations: Abdomen is soft. Tenderness: There is no abdominal tenderness. There is no guarding. Musculoskeletal:         General: Swelling (Right humerus, normal distal pulses) and deformity present. Normal range of motion. Cervical back: Normal range of motion and neck supple. Right lower leg: No edema. Left lower leg: No edema. Skin:     General: Skin is warm and dry. Neurological:      General: No focal deficit present. Mental Status: She is alert. She is disoriented. Cranial Nerves: No cranial nerve deficit. Psychiatric:         Mood and Affect: Mood normal.         Behavior: Behavior normal.         Cognition and Memory: Cognition is impaired. Memory is impaired. She exhibits impaired recent memory and impaired remote memory.         Procedures          Orders Placed This Encounter   Procedures    CT HEAD WO CONTRAST    XR CHEST PORTABLE    CBC with Auto Differential    CMP    Lactate, Sepsis    Procalcitonin    Urinalysis w rflx microscopic    Magnesium    Sling and Swath    Straight cath    EKG 12 Lead        Medications   fentaNYL (SUBLIMAZE) injection 25 mcg (25 mcg IntraVENous Given 3/18/23 1712)       New Prescriptions    No medications on file        Past Medical History:   Diagnosis Date    Diabetes ([address]     Diabetes mellitus ([facility] [address]     Hypertension         Past Surgical History:   Procedure Laterality Date APPENDECTOMY      CARDIAC PROCEDURE N/A 10/3/2022    Coronary angiography performed by Ilana Rodríguez MD at [address] [facility] N/A 10/3/2022    Percutaneous coronary intervention performed by Ilana Rodríguez MD at [address]  07/08/2019    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 Dr. Bethel Rodríguez        No family history on file. Social History     Socioeconomic History    Marital status: Single   Tobacco Use    Smoking status: Never    Smokeless tobacco: Never   Substance and Sexual Activity    Alcohol use: Never    Drug use: Never   Social History Narrative    ** Merged History Encounter **             Allergies: Patient has no known allergies. Previous Medications    ACETAMINOPHEN (TYLENOL) 325 MG TABLET    Take 325 mg by mouth    ASPIRIN 81 MG CHEWABLE TABLET    Take 1 tablet by mouth daily    ATORVASTATIN (LIPITOR) 80 MG TABLET    Take 80 mg by mouth    CARVEDILOL (COREG) 6.25 MG TABLET    Take 1 tablet by mouth 2 times daily (with meals)    CLOPIDOGREL (PLAVIX) 75 MG TABLET    Take 1 tablet by mouth daily    DOCUSATE SODIUM (COLACE) 100 MG CAPSULE    Take 1 capsule by mouth 2 times daily for 10 days    IMATINIB (GLEEVEC) 400 MG CHEMO TABLET    Take 400 mg by mouth    LACTULOSE (CHRONULAC) 10 GM/15ML SOLUTION    Take 10 g by mouth    LEVOTHYROXINE (SYNTHROID) 100 MCG TABLET    Take by mouth every morning (before breakfast)    LISINOPRIL (PRINIVIL;ZESTRIL) 10 MG TABLET    Take by mouth daily    MAGNESIUM OXIDE (MAG-OX) 400 (240 MG) MG TABLET    Take 400 mg by mouth    MECLIZINE (ANTIVERT) 25 MG TABLET    Take by mouth 3 times daily as needed    MEGESTROL (MEGACE) 40 MG TABLET    TAKE 1 TABLET BY MOUTH EVERY DAY    METFORMIN (GLUCOPHAGE) 500 MG TABLET    Take 500 mg by mouth    NITROGLYCERIN (NITROSTAT) 0.4 MG SL TABLET    Place 1 tablet under the tongue every 5 minutes as needed for Chest pain up to max of 3 total doses.  If no relief after 1 dose, call 911. OFLOXACIN (OCUFLOX) 0.3 % SOLUTION    Apply 2 drops to eye 4 times daily    ONDANSETRON (ZOFRAN) 4 MG TABLET    Take 1 tablet by mouth 3 times daily as needed for Nausea or Vomiting    OXYCODONE (ROXICODONE) 5 MG IMMEDIATE RELEASE TABLET    Take 1 tablet by mouth every 6 hours as needed for Pain for up to 5 days. Intended supply: 3 days. Take lowest dose possible to manage pain Max Daily Amount: 20 mg    PANTOPRAZOLE (PROTONIX) 40 MG TABLET    Take 1 tablet by mouth every morning (before breakfast)    SENNA-DOCUSATE (PERICOLACE) 8.6-50 MG PER TABLET    Take 2 tablets by mouth        Results for orders placed or performed during the hospital encounter of 03/18/23   CT HEAD WO CONTRAST    Narrative    HEAD CT WITHOUT CONTRAST  3/18/2023     HISTORY:   fall out of bed, confusion    TECHNIQUE: Noncontrast axial images were obtained through the brain. All CT  scans at this facility used dose modulation, interactive reconstruction and/or  weight based dosing when appropriate to reduce radiation dose to as low as  reasonably achievable. COMPARISON: 3/14/2023    FINDINGS: There is no acute intracranial hemorrhage, significant mass effect or  CT evidence of acute large artery territorial infarction. Please note that a  hyperacute infarct or small vessel infarct may not be apparent on initial CT  imaging. Moderate cerebral volume loss is present with compensatory ventricular  enlargement and bilateral hippocampal atrophy. There is no hydrocephalus , intra-axial mass or abnormal extra-axial fluid  collection. There are no displaced skull fractures. The mastoid air cells and  paranasal sinuses are clear where imaged. Impression    1. Cerebral volume loss with hippocampal atrophy and bilateral ventricular  enlargement. 2. No acute intracranial hemorrhage.      XR CHEST PORTABLE    Narrative    EXAMINATION: XR CHEST PORTABLE 3/18/2023 4:55 PM    ACCESSION NUMBER: [id number]    COMPARISON: Chest radiograph of March 14, 2023    INDICATION: ams    TECHNIQUE: A single portable AP view of the chest was obtained. FINDINGS:   Cardiac pericardial silhouette unchanged in size or morphology. Similar  subsegmental atelectasis and/or scarring of the left lung base. No infiltrative  opacity, effusion, or pneumothorax. Similar appearance of a remote compression  fracture of the proximal right humerus. .      Impression    1. No acute intrathoracic process. 2.  Redemonstration of right proximal humerus impaction fracture.       CBC with Auto Differential   Result Value Ref Range    WBC 11.8 (H) 4.3 - 11.1 K/uL    RBC 3.66 (L) 4.05 - 5.2 M/uL    Hemoglobin 11.0 (L) 11.7 - 15.4 g/dL    Hematocrit 34.5 (L) 35.8 - 46.3 %    MCV 94.3 82 - 102 FL    MCH 30.1 26.1 - 32.9 PG    MCHC 31.9 31.4 - 35.0 g/dL    RDW 14.9 (H) 11.9 - 14.6 %    Platelets 487 029 - 785 K/uL    MPV 10.4 9.4 - 12.3 FL    nRBC 0.00 0.0 - 0.2 K/uL    Differential Type AUTOMATED      Seg Neutrophils 83 (H) 43 - 78 %    Lymphocytes 10 (L) 13 - 44 %    Monocytes 6 4.0 - 12.0 %    Eosinophils % 0 (L) 0.5 - 7.8 %    Basophils 0 0.0 - 2.0 %    Immature Granulocytes 1 0.0 - 5.0 %    Segs Absolute 9.9 (H) 1.7 - 8.2 K/UL    Absolute Lymph # 1.2 0.5 - 4.6 K/UL    Absolute Mono # 0.7 0.1 - 1.3 K/UL    Absolute Eos # 0.0 0.0 - 0.8 K/UL    Basophils Absolute 0.0 0.0 - 0.2 K/UL    Absolute Immature Granulocyte 0.1 0.0 - 0.5 K/UL   CMP   Result Value Ref Range    Sodium 141 133 - 143 mmol/L    Potassium 4.0 3.5 - 5.1 mmol/L    Chloride 107 101 - 110 mmol/L    CO2 30 21 - 32 mmol/L    Anion Gap 4 2 - 11 mmol/L    Glucose 188 (H) 65 - 100 mg/dL    BUN 33 (H) 8 - 23 MG/DL    Creatinine 1.30 (H) 0.6 - 1.0 MG/DL    Est, Glom Filt Rate 40 (L) >60 ml/min/1.73m2    Calcium 9.4 8.3 - 10.4 MG/DL    Total Bilirubin 2.1 (H) 0.2 - 1.1 MG/DL    ALT 29 12 - 65 U/L    AST 30 15 - 37 U/L    Alk Phosphatase 82 50 - 136 U/L    Total Protein 8.3 (H) 6.3 - 8.2 g/dL    Albumin 3.8 3.2 - 4.6 g/dL    Globulin 4.5 2.8 - 4.5 g/dL    Albumin/Globulin Ratio 0.8 0.4 - 1.6     Lactate, Sepsis   Result Value Ref Range    Lactic Acid, Sepsis 1.5 0.4 - 2.0 MMOL/L   Procalcitonin   Result Value Ref Range    Procalcitonin 0.16 0.00 - 0.49 ng/mL   Urinalysis w rflx microscopic   Result Value Ref Range    Color, UA YELLOW/STRAW      Appearance CLEAR      Specific Gravity, UA 1.029 (H) 1.001 - 1.023      pH, Urine 5.0 5.0 - 9.0      Protein,  (A) NEG mg/dL    Glucose,  mg/dL    Ketones, Urine 15 (A) NEG mg/dL    Bilirubin Urine Negative NEG      Blood, Urine Negative NEG      Urobilinogen, Urine 1.0 0.2 - 1.0 EU/dL    Nitrite, Urine Negative NEG      Leukocyte Esterase, Urine Negative NEG     Magnesium   Result Value Ref Range    Magnesium 2.5 (H) 1.8 - 2.4 mg/dL   EKG 12 Lead   Result Value Ref Range    Ventricular Rate 65 BPM    Atrial Rate 65 BPM    P-R Interval 123 ms    QRS Duration 92 ms    Q-T Interval 406 ms    QTc Calculation (Bazett) 423 ms    P Axis 235 degrees    R Axis -26 degrees    T Axis -5 degrees    Diagnosis Sinus or ectopic atrial rhythm         XR CHEST PORTABLE   Final Result   1. No acute intrathoracic process. 2.  Redemonstration of right proximal humerus impaction fracture. CT HEAD WO CONTRAST   Final Result      1. Cerebral volume loss with hippocampal atrophy and bilateral ventricular   enlargement. 2. No acute intracranial hemorrhage. Voice dictation software was used during the making of this note. This software is not perfect and grammatical and other typographical errors may be present. This note has not been completely proofread for errors.      Woodrow Lopez MD  03/18/23 0812

## 2023-03-18 NOTE — ED NOTES
This RN spoke with son Andra Valentin. Pt son verified prescription has not yet been picked up and patient has not been wearing sling. This RN educated on importance of making sure patient is wearing sling and taking pain medication to help promote ADLs at home. Dr Camilla Topete made aware.       Michael Luo RN  03/18/23 1751

## 2023-03-23 ENCOUNTER — HOSPITAL ENCOUNTER (OUTPATIENT)
Age: 88
Setting detail: OBSERVATION
Discharge: NURSING, REHAB OR LONG-TERM CARE | End: 2023-04-05
Attending: EMERGENCY MEDICINE | Admitting: INTERNAL MEDICINE
Payer: MEDICARE

## 2023-03-23 DIAGNOSIS — R44.1 VISUAL HALLUCINATIONS: ICD-10-CM

## 2023-03-23 DIAGNOSIS — M25.511 ACUTE PAIN OF RIGHT SHOULDER: ICD-10-CM

## 2023-03-23 DIAGNOSIS — R41.0 DELIRIUM: Primary | ICD-10-CM

## 2023-03-23 DIAGNOSIS — F03.C11 SEVERE DEMENTIA WITH AGITATION, UNSPECIFIED DEMENTIA TYPE (HCC): ICD-10-CM

## 2023-03-23 LAB
ALBUMIN SERPL-MCNC: 3.3 G/DL (ref 3.2–4.6)
ALBUMIN/GLOB SERPL: 0.8 (ref 0.4–1.6)
ALP SERPL-CCNC: 80 U/L (ref 50–136)
ALT SERPL-CCNC: 15 U/L (ref 12–65)
ANION GAP SERPL CALC-SCNC: 5 MMOL/L (ref 2–11)
AST SERPL-CCNC: 20 U/L (ref 15–37)
BASOPHILS # BLD: 0 K/UL (ref 0–0.2)
BASOPHILS NFR BLD: 0 % (ref 0–2)
BILIRUB SERPL-MCNC: 1.9 MG/DL (ref 0.2–1.1)
BUN SERPL-MCNC: 27 MG/DL (ref 8–23)
CALCIUM SERPL-MCNC: 9.6 MG/DL (ref 8.3–10.4)
CHLORIDE SERPL-SCNC: 108 MMOL/L (ref 101–110)
CO2 SERPL-SCNC: 26 MMOL/L (ref 21–32)
CREAT SERPL-MCNC: 0.9 MG/DL (ref 0.6–1)
DIFFERENTIAL METHOD BLD: ABNORMAL
EOSINOPHIL # BLD: 0.1 K/UL (ref 0–0.8)
EOSINOPHIL NFR BLD: 1 % (ref 0.5–7.8)
ERYTHROCYTE [DISTWIDTH] IN BLOOD BY AUTOMATED COUNT: 14.6 % (ref 11.9–14.6)
GLOBULIN SER CALC-MCNC: 4.3 G/DL (ref 2.8–4.5)
GLUCOSE SERPL-MCNC: 136 MG/DL (ref 65–100)
HCT VFR BLD AUTO: 31.9 % (ref 35.8–46.3)
HGB BLD-MCNC: 10.4 G/DL (ref 11.7–15.4)
IMM GRANULOCYTES # BLD AUTO: 0.1 K/UL (ref 0–0.5)
IMM GRANULOCYTES NFR BLD AUTO: 1 % (ref 0–5)
LYMPHOCYTES # BLD: 1 K/UL (ref 0.5–4.6)
LYMPHOCYTES NFR BLD: 10 % (ref 13–44)
MCH RBC QN AUTO: 30 PG (ref 26.1–32.9)
MCHC RBC AUTO-ENTMCNC: 32.6 G/DL (ref 31.4–35)
MCV RBC AUTO: 91.9 FL (ref 82–102)
MONOCYTES # BLD: 0.6 K/UL (ref 0.1–1.3)
MONOCYTES NFR BLD: 7 % (ref 4–12)
NEUTS SEG # BLD: 7.7 K/UL (ref 1.7–8.2)
NEUTS SEG NFR BLD: 81 % (ref 43–78)
NRBC # BLD: 0 K/UL (ref 0–0.2)
PLATELET # BLD AUTO: 287 K/UL (ref 150–450)
PMV BLD AUTO: 9.7 FL (ref 9.4–12.3)
POTASSIUM SERPL-SCNC: 3.7 MMOL/L (ref 3.5–5.1)
PROT SERPL-MCNC: 7.6 G/DL (ref 6.3–8.2)
RBC # BLD AUTO: 3.47 M/UL (ref 4.05–5.2)
SODIUM SERPL-SCNC: 139 MMOL/L (ref 133–143)
WBC # BLD AUTO: 9.5 K/UL (ref 4.3–11.1)

## 2023-03-23 PROCEDURE — 85025 COMPLETE CBC W/AUTO DIFF WBC: CPT

## 2023-03-23 PROCEDURE — 80053 COMPREHEN METABOLIC PANEL: CPT

## 2023-03-23 PROCEDURE — 2580000003 HC RX 258: Performed by: EMERGENCY MEDICINE

## 2023-03-23 PROCEDURE — 99285 EMERGENCY DEPT VISIT HI MDM: CPT

## 2023-03-23 PROCEDURE — 6370000000 HC RX 637 (ALT 250 FOR IP): Performed by: EMERGENCY MEDICINE

## 2023-03-23 RX ORDER — DIPHENHYDRAMINE HCL 25 MG
25 CAPSULE ORAL
Status: COMPLETED | OUTPATIENT
Start: 2023-03-23 | End: 2023-03-23

## 2023-03-23 RX ORDER — OLANZAPINE 5 MG/1
10 TABLET, ORALLY DISINTEGRATING ORAL
Status: COMPLETED | OUTPATIENT
Start: 2023-03-23 | End: 2023-03-23

## 2023-03-23 RX ORDER — 0.9 % SODIUM CHLORIDE 0.9 %
500 INTRAVENOUS SOLUTION INTRAVENOUS ONCE
Status: COMPLETED | OUTPATIENT
Start: 2023-03-23 | End: 2023-03-23

## 2023-03-23 RX ADMIN — SODIUM CHLORIDE 500 ML: 9 INJECTION, SOLUTION INTRAVENOUS at 21:03

## 2023-03-23 RX ADMIN — DIPHENHYDRAMINE HYDROCHLORIDE 25 MG: 25 CAPSULE ORAL at 21:47

## 2023-03-23 RX ADMIN — OLANZAPINE 10 MG: 5 TABLET, ORALLY DISINTEGRATING ORAL at 21:47

## 2023-03-23 ASSESSMENT — PAIN SCALES - PAIN ASSESSMENT IN ADVANCED DEMENTIA (PAINAD)
BREATHING: 0
CONSOLABILITY: 0
BODYLANGUAGE: 0
FACIALEXPRESSION: 0
TOTALSCORE: 1
NEGVOCALIZATION: 1

## 2023-03-23 ASSESSMENT — LIFESTYLE VARIABLES
HOW OFTEN DO YOU HAVE A DRINK CONTAINING ALCOHOL: NEVER
HOW MANY STANDARD DRINKS CONTAINING ALCOHOL DO YOU HAVE ON A TYPICAL DAY: PATIENT DOES NOT DRINK

## 2023-03-23 ASSESSMENT — PAIN - FUNCTIONAL ASSESSMENT
PAIN_FUNCTIONAL_ASSESSMENT: ADULT NONVERBAL PAIN SCALE (NPVS)
PAIN_FUNCTIONAL_ASSESSMENT: PAIN ASSESSMENT IN ADVANCED DEMENTIA (PAINAD)

## 2023-03-23 NOTE — CARE COORDINATION
CM received call from pts jaden Chen [phone] asking about transportation for pt to MD appointment d/t the son that lives with pt and pt do not drive, son encouraged to call insurance to see it they pay for transportation for MD appointments then to call the EMS service of choice to arrange transportation. Cindy Prather voiced understanding of information provided. Son also asked about home care and again was instructed to contact pts PCP to arrange home care.

## 2023-03-23 NOTE — ED TRIAGE NOTES
Pt arrive via [facility] from home. Son stated diagnosed with dementia x 1 week ago. Pt needs eval for in home care. Right arm fracture approx a couple days ago, bruising noted and limited movement for this reason.

## 2023-03-24 ENCOUNTER — APPOINTMENT (OUTPATIENT)
Dept: ULTRASOUND IMAGING | Age: 88
End: 2023-03-24
Payer: MEDICARE

## 2023-03-24 PROBLEM — R53.1 GENERALIZED WEAKNESS: Status: ACTIVE | Noted: 2023-03-24

## 2023-03-24 LAB
APPEARANCE UR: CLEAR
BACTERIA URNS QL MICRO: 0 /HPF
BILIRUB UR QL: NEGATIVE
CASTS URNS QL MICRO: ABNORMAL /LPF
COLOR UR: ABNORMAL
EPI CELLS #/AREA URNS HPF: ABNORMAL /HPF
GLUCOSE BLD STRIP.AUTO-MCNC: 105 MG/DL (ref 65–100)
GLUCOSE BLD STRIP.AUTO-MCNC: 114 MG/DL (ref 65–100)
GLUCOSE BLD STRIP.AUTO-MCNC: 170 MG/DL (ref 65–100)
GLUCOSE UR STRIP.AUTO-MCNC: NEGATIVE MG/DL
HGB UR QL STRIP: NEGATIVE
KETONES UR QL STRIP.AUTO: 15 MG/DL
LEUKOCYTE ESTERASE UR QL STRIP.AUTO: NEGATIVE
NITRITE UR QL STRIP.AUTO: NEGATIVE
OTHER OBSERVATIONS: ABNORMAL
PH UR STRIP: 5 (ref 5–9)
PROT UR STRIP-MCNC: 30 MG/DL
RBC #/AREA URNS HPF: ABNORMAL /HPF
SERVICE CMNT-IMP: ABNORMAL
SP GR UR REFRACTOMETRY: 1.03 (ref 1–1.02)
T4 FREE SERPL-MCNC: 0.5 NG/DL (ref 0.78–1.46)
TSH W FREE THYROID IF ABNORMAL: 85.7 UIU/ML (ref 0.36–3.74)
UROBILINOGEN UR QL STRIP.AUTO: 1 EU/DL (ref 0.2–1)
WBC URNS QL MICRO: ABNORMAL /HPF

## 2023-03-24 PROCEDURE — 6360000002 HC RX W HCPCS: Performed by: EMERGENCY MEDICINE

## 2023-03-24 PROCEDURE — G0378 HOSPITAL OBSERVATION PER HR: HCPCS

## 2023-03-24 PROCEDURE — 96374 THER/PROPH/DIAG INJ IV PUSH: CPT

## 2023-03-24 PROCEDURE — 2580000003 HC RX 258: Performed by: INTERNAL MEDICINE

## 2023-03-24 PROCEDURE — 36415 COLL VENOUS BLD VENIPUNCTURE: CPT

## 2023-03-24 PROCEDURE — 84443 ASSAY THYROID STIM HORMONE: CPT

## 2023-03-24 PROCEDURE — 6370000000 HC RX 637 (ALT 250 FOR IP): Performed by: INTERNAL MEDICINE

## 2023-03-24 PROCEDURE — 81001 URINALYSIS AUTO W/SCOPE: CPT

## 2023-03-24 PROCEDURE — 97166 OT EVAL MOD COMPLEX 45 MIN: CPT

## 2023-03-24 PROCEDURE — 97162 PT EVAL MOD COMPLEX 30 MIN: CPT

## 2023-03-24 PROCEDURE — 82962 GLUCOSE BLOOD TEST: CPT

## 2023-03-24 PROCEDURE — 96372 THER/PROPH/DIAG INJ SC/IM: CPT

## 2023-03-24 PROCEDURE — 2500000003 HC RX 250 WO HCPCS: Performed by: INTERNAL MEDICINE

## 2023-03-24 PROCEDURE — 84439 ASSAY OF FREE THYROXINE: CPT

## 2023-03-24 PROCEDURE — 6360000002 HC RX W HCPCS: Performed by: INTERNAL MEDICINE

## 2023-03-24 RX ORDER — ACETAMINOPHEN 325 MG/1
650 TABLET ORAL EVERY 6 HOURS PRN
Status: DISCONTINUED | OUTPATIENT
Start: 2023-03-24 | End: 2023-04-05 | Stop reason: HOSPADM

## 2023-03-24 RX ORDER — LORAZEPAM 2 MG/ML
1 INJECTION INTRAMUSCULAR ONCE
Status: COMPLETED | OUTPATIENT
Start: 2023-03-24 | End: 2023-03-24

## 2023-03-24 RX ORDER — ATORVASTATIN CALCIUM 80 MG/1
80 TABLET, FILM COATED ORAL NIGHTLY
Status: DISCONTINUED | OUTPATIENT
Start: 2023-03-24 | End: 2023-04-05 | Stop reason: HOSPADM

## 2023-03-24 RX ORDER — SODIUM CHLORIDE 0.9 % (FLUSH) 0.9 %
5-40 SYRINGE (ML) INJECTION PRN
Status: DISCONTINUED | OUTPATIENT
Start: 2023-03-24 | End: 2023-04-05 | Stop reason: HOSPADM

## 2023-03-24 RX ORDER — ASPIRIN 81 MG/1
81 TABLET, CHEWABLE ORAL DAILY
Status: DISCONTINUED | OUTPATIENT
Start: 2023-03-25 | End: 2023-04-05 | Stop reason: HOSPADM

## 2023-03-24 RX ORDER — ONDANSETRON 4 MG/1
4 TABLET, ORALLY DISINTEGRATING ORAL EVERY 8 HOURS PRN
Status: DISCONTINUED | OUTPATIENT
Start: 2023-03-24 | End: 2023-04-05 | Stop reason: HOSPADM

## 2023-03-24 RX ORDER — INSULIN LISPRO 100 [IU]/ML
0-4 INJECTION, SOLUTION INTRAVENOUS; SUBCUTANEOUS NIGHTLY
Status: DISCONTINUED | OUTPATIENT
Start: 2023-03-24 | End: 2023-04-05 | Stop reason: HOSPADM

## 2023-03-24 RX ORDER — INSULIN LISPRO 100 [IU]/ML
0-8 INJECTION, SOLUTION INTRAVENOUS; SUBCUTANEOUS
Status: DISCONTINUED | OUTPATIENT
Start: 2023-03-24 | End: 2023-04-05 | Stop reason: HOSPADM

## 2023-03-24 RX ORDER — ONDANSETRON 2 MG/ML
4 INJECTION INTRAMUSCULAR; INTRAVENOUS EVERY 6 HOURS PRN
Status: DISCONTINUED | OUTPATIENT
Start: 2023-03-24 | End: 2023-04-05 | Stop reason: HOSPADM

## 2023-03-24 RX ORDER — ACETAMINOPHEN 650 MG/1
650 SUPPOSITORY RECTAL EVERY 6 HOURS PRN
Status: DISCONTINUED | OUTPATIENT
Start: 2023-03-24 | End: 2023-04-05 | Stop reason: HOSPADM

## 2023-03-24 RX ORDER — SODIUM CHLORIDE 9 MG/ML
INJECTION, SOLUTION INTRAVENOUS PRN
Status: DISCONTINUED | OUTPATIENT
Start: 2023-03-24 | End: 2023-04-05 | Stop reason: HOSPADM

## 2023-03-24 RX ORDER — POLYETHYLENE GLYCOL 3350 17 G/17G
17 POWDER, FOR SOLUTION ORAL DAILY PRN
Status: DISCONTINUED | OUTPATIENT
Start: 2023-03-24 | End: 2023-04-05 | Stop reason: HOSPADM

## 2023-03-24 RX ORDER — CARVEDILOL 6.25 MG/1
6.25 TABLET ORAL 2 TIMES DAILY WITH MEALS
Status: DISCONTINUED | OUTPATIENT
Start: 2023-03-24 | End: 2023-04-05 | Stop reason: HOSPADM

## 2023-03-24 RX ORDER — LEVOTHYROXINE SODIUM 0.1 MG/1
100 TABLET ORAL
Status: DISCONTINUED | OUTPATIENT
Start: 2023-03-25 | End: 2023-04-05 | Stop reason: HOSPADM

## 2023-03-24 RX ORDER — CLOPIDOGREL BISULFATE 75 MG/1
75 TABLET ORAL DAILY
Status: DISCONTINUED | OUTPATIENT
Start: 2023-03-25 | End: 2023-04-05 | Stop reason: HOSPADM

## 2023-03-24 RX ORDER — ENOXAPARIN SODIUM 100 MG/ML
40 INJECTION SUBCUTANEOUS DAILY
Status: DISCONTINUED | OUTPATIENT
Start: 2023-03-24 | End: 2023-04-05 | Stop reason: HOSPADM

## 2023-03-24 RX ORDER — SODIUM CHLORIDE 9 MG/ML
INJECTION, SOLUTION INTRAVENOUS ONCE
Status: COMPLETED | OUTPATIENT
Start: 2023-03-24 | End: 2023-03-24

## 2023-03-24 RX ORDER — MEGESTROL ACETATE 40 MG/1
40 TABLET ORAL DAILY
Status: DISCONTINUED | OUTPATIENT
Start: 2023-03-24 | End: 2023-04-05 | Stop reason: HOSPADM

## 2023-03-24 RX ORDER — PANTOPRAZOLE SODIUM 40 MG/1
40 TABLET, DELAYED RELEASE ORAL
Status: DISCONTINUED | OUTPATIENT
Start: 2023-03-25 | End: 2023-04-05 | Stop reason: HOSPADM

## 2023-03-24 RX ORDER — SODIUM CHLORIDE 0.9 % (FLUSH) 0.9 %
5-40 SYRINGE (ML) INJECTION EVERY 12 HOURS SCHEDULED
Status: DISCONTINUED | OUTPATIENT
Start: 2023-03-24 | End: 2023-04-05 | Stop reason: HOSPADM

## 2023-03-24 RX ORDER — LISINOPRIL 5 MG/1
10 TABLET ORAL DAILY
Status: DISCONTINUED | OUTPATIENT
Start: 2023-03-24 | End: 2023-04-05 | Stop reason: HOSPADM

## 2023-03-24 RX ADMIN — ENOXAPARIN SODIUM 40 MG: 100 INJECTION SUBCUTANEOUS at 18:22

## 2023-03-24 RX ADMIN — LISINOPRIL 10 MG: 5 TABLET ORAL at 18:12

## 2023-03-24 RX ADMIN — ATORVASTATIN CALCIUM 80 MG: 80 TABLET, FILM COATED ORAL at 20:53

## 2023-03-24 RX ADMIN — SODIUM CHLORIDE: 9 INJECTION, SOLUTION INTRAVENOUS at 18:22

## 2023-03-24 RX ADMIN — CARVEDILOL 6.25 MG: 6.25 TABLET, FILM COATED ORAL at 18:12

## 2023-03-24 RX ADMIN — LORAZEPAM 1 MG: 2 INJECTION INTRAMUSCULAR; INTRAVENOUS at 01:06

## 2023-03-24 RX ADMIN — MEGESTROL ACETATE 40 MG: 40 TABLET ORAL at 18:12

## 2023-03-24 RX ADMIN — TUBERCULIN PURIFIED PROTEIN DERIVATIVE 5 UNITS: 5 INJECTION, SOLUTION INTRADERMAL at 18:05

## 2023-03-24 ASSESSMENT — PAIN - FUNCTIONAL ASSESSMENT: PAIN_FUNCTIONAL_ASSESSMENT: ADULT NONVERBAL PAIN SCALE (NPVS)

## 2023-03-24 NOTE — ED NOTES
Pt repositioned in bed and pillows put between her knees. No signs of distress.      Marco Fried RN  03/24/23 3123 Earnest Browne RN  03/24/23 0869

## 2023-03-24 NOTE — ED PROVIDER NOTES
ALT 15 12 - 65 U/L    AST 20 15 - 37 U/L    Alk Phosphatase 80 50 - 136 U/L    Total Protein 7.6 6.3 - 8.2 g/dL    Albumin 3.3 3.2 - 4.6 g/dL    Globulin 4.3 2.8 - 4.5 g/dL    Albumin/Globulin Ratio 0.8 0.4 - 1.6          No orders to display                     Voice dictation software was used during the making of this note. This software is not perfect and grammatical and other typographical errors may be present. This note has not been completely proofread for errors.      Karyna Vickers MD  03/25/23 1200

## 2023-03-24 NOTE — THERAPY EVALUATION
ACUTE PHYSICAL THERAPY GOALS:   (Developed with and agreed upon by patient and/or caregiver.)  (1.) Nish Deleon will move from supine to sit and sit to supine , scoot up and down, and roll side to side with MINIMAL ASSIST within 7 treatment day(s). (2.) Nish Deleon will transfer from bed to chair and chair to bed with MINIMAL ASSIST using the least restrictive device within 7 treatment day(s). (3.) Nish Deleon will ambulate with MINIMAL ASSIST for 25 feet with the least restrictive device within 7 treatment day(s). (4.) Nish Deleon will perform standing static and dynamic balance activities x 10 minutes with MINIMAL ASSIST to improve safety within 7 treatment day(s). (5.) Nish Deleon will perform therapeutic exercises x 20 min for HEP with STAND BY ASSIST to improve strength, endurance, and functional mobility within 7 treatment day(s). PHYSICAL THERAPY Initial Assessment, Daily Note, and AM  (Link to Caseload Tracking:    Acknowledge Orders  Time In/Out  PT Charge Capture  Rehab Caseload Tracker    Nish Deleon is a 81 y.o. female   PRIMARY DIAGNOSIS: <principal problem not specified>  No admission diagnoses are documented for this encounter. Reason for Referral: Generalized Muscle Weakness (M62.81)  Other lack of cordination (R27.8)  Difficulty in walking, Not elsewhere classified (R26.2)  Other abnormalities of gait and mobility (R26.89)  History of falling (Z91.81)  Emergency: Payor: Bia Valenzuela / Plan: [employer] GOLD [employer]US [employer]MO / Product Type: *No Product type* /     ASSESSMENT:     REHAB RECOMMENDATIONS:   Recommendation to date pending progress:  Setting:  Short-term Rehab    Equipment:    To Be Determined     ASSESSMENT:  Ms. Mary Petty is an 81year old F who presents to ED with inability to care for herself, also with recent fall and RUE humeral fx. Per chart there is a recent diagnosis of dementia.    PT facilitated therapeutic activities including bed mobility (supine > sit,

## 2023-03-24 NOTE — H&P
135 lb (61.2 kg), SpO2 97 %. General:    Chronically ill appearing. Head:  Normocephalic, atraumatic  Eyes:  Sclerae appear normal.  Pupils equally round. ENT:  Nares appear normal.  Moist oral mucosa  Neck:  No restricted ROM. Trachea midline   CV:   RRR. No m/r/g. No jugular venous distension. Lungs:   CTAB. No wheezing, rhonchi, or rales. Symmetric expansion. Room air O2. Abdomen:   Soft, nontender, nondistended. Extremities: No cyanosis or clubbing. No edema  Skin:     No rashes and normal coloration. Warm and dry. Reported sacral ulceration but exam deferred due to patient's mentation. Neuro:  Somnolent so unable to fully assess, though will wake up and converse briefly, follow simple commands. Moves all extremities. Psych:  Normal mood and affect.       I have personally reviewed labs and tests:  Recent Labs:  Recent Results (from the past 24 hour(s))   CBC with Auto Differential    Collection Time: 03/23/23  8:44 PM   Result Value Ref Range    WBC 9.5 4.3 - 11.1 K/uL    RBC 3.47 (L) 4.05 - 5.2 M/uL    Hemoglobin 10.4 (L) 11.7 - 15.4 g/dL    Hematocrit 31.9 (L) 35.8 - 46.3 %    MCV 91.9 82 - 102 FL    MCH 30.0 26.1 - 32.9 PG    MCHC 32.6 31.4 - 35.0 g/dL    RDW 14.6 11.9 - 14.6 %    Platelets 306 953 - 898 K/uL    MPV 9.7 9.4 - 12.3 FL    nRBC 0.00 0.0 - 0.2 K/uL    Differential Type AUTOMATED      Seg Neutrophils 81 (H) 43 - 78 %    Lymphocytes 10 (L) 13 - 44 %    Monocytes 7 4.0 - 12.0 %    Eosinophils % 1 0.5 - 7.8 %    Basophils 0 0.0 - 2.0 %    Immature Granulocytes 1 0.0 - 5.0 %    Segs Absolute 7.7 1.7 - 8.2 K/UL    Absolute Lymph # 1.0 0.5 - 4.6 K/UL    Absolute Mono # 0.6 0.1 - 1.3 K/UL    Absolute Eos # 0.1 0.0 - 0.8 K/UL    Basophils Absolute 0.0 0.0 - 0.2 K/UL    Absolute Immature Granulocyte 0.1 0.0 - 0.5 K/UL   Comprehensive Metabolic Panel    Collection Time: 03/23/23  8:44 PM   Result Value Ref Range    Sodium 139 133 - 143 mmol/L    Potassium 3.7 3.5 - 5.1 mmol/L    Chloride

## 2023-03-24 NOTE — CODE DOCUMENTATION
Several attempts to assess patient have been this morning and patient continues to sleep and is not arousable. Will continue to attempt to assess patient.

## 2023-03-24 NOTE — ED NOTES
Pt received hot food tray. Pt very lethargic but stated she does not want any food.      Park Bocanegra, RN  03/24/23 2435

## 2023-03-24 NOTE — ED NOTES
TRANSFER - OUT REPORT:    Verbal report given to Arun Coello RN on Dalila Borja  being transferred to  [address] for routine progression of patient care       Report consisted of patient's Situation, Background, Assessment and   Recommendations(SBAR). Information from the following report(s) ED Encounter Summary was reviewed with the receiving nurse. [place] Assessment: Presents to emergency department  because of falls (Syncope, seizure, or loss of consciousness): No, Age > 79: Yes, Altered Mental Status, Intoxication with alcohol or substance confusion (Disorientation, impaired judgment, poor safety awaremess, or inability to follow instructions): Yes, Impaired Mobility: Ambulates or transfers with assistive devices or assistance; Unable to ambulate or transer.: Yes, Nursing Judgement: Yes  Lines:   Peripheral IV 03/23/23 Right Forearm (Active)   Site Assessment Clean, dry & intact 03/23/23 2047   Line Status Flushed 03/24/23 1200   Phlebitis Assessment No symptoms 03/23/23 2047   Infiltration Assessment 0 03/23/23 2047        Opportunity for questions and clarification was provided.       Patient transported with:  Traci Burnette RN  03/24/23 1492

## 2023-03-24 NOTE — ED NOTES
Pt cathed for urine. Pt cleaned, new brief and purewick put in place, and linen changed. Pressure ulcer on sacrum. Alleven foam bandage put in place after cleaned with wound . Pt rolled to left side due to broken humerus on right arm. Doctor made aware.      Marco Fried RN  03/24/23 9100 SOB at 5am thinks its from straining for a BM. Hasn't had a BM since beginning of week.

## 2023-03-25 LAB
APPEARANCE UR: CLEAR
BACTERIA URNS QL MICRO: 0 /HPF
BILIRUB UR QL: NEGATIVE
CASTS URNS QL MICRO: ABNORMAL /LPF
COLOR UR: ABNORMAL
CRYSTALS URNS QL MICRO: 0 /LPF
EPI CELLS #/AREA URNS HPF: ABNORMAL /HPF
GLUCOSE BLD STRIP.AUTO-MCNC: 114 MG/DL (ref 65–100)
GLUCOSE BLD STRIP.AUTO-MCNC: 164 MG/DL (ref 65–100)
GLUCOSE BLD STRIP.AUTO-MCNC: 216 MG/DL (ref 65–100)
GLUCOSE UR STRIP.AUTO-MCNC: NEGATIVE MG/DL
HGB UR QL STRIP: NEGATIVE
KETONES UR QL STRIP.AUTO: 15 MG/DL
LEUKOCYTE ESTERASE UR QL STRIP.AUTO: NEGATIVE
MM INDURATION, POC: 0 MM (ref 0–5)
MUCOUS THREADS URNS QL MICRO: 0 /LPF
NITRITE UR QL STRIP.AUTO: NEGATIVE
PH UR STRIP: 5 (ref 5–9)
PPD, POC: NEGATIVE
PROT UR STRIP-MCNC: 30 MG/DL
RBC #/AREA URNS HPF: ABNORMAL /HPF
SERVICE CMNT-IMP: ABNORMAL
SP GR UR REFRACTOMETRY: 1.03 (ref 1–1.02)
URINE CULTURE IF INDICATED: ABNORMAL
UROBILINOGEN UR QL STRIP.AUTO: 1 EU/DL (ref 0.2–1)
WBC URNS QL MICRO: ABNORMAL /HPF
YEAST URNS QL MICRO: ABNORMAL

## 2023-03-25 PROCEDURE — 51798 US URINE CAPACITY MEASURE: CPT

## 2023-03-25 PROCEDURE — 6370000000 HC RX 637 (ALT 250 FOR IP): Performed by: INTERNAL MEDICINE

## 2023-03-25 PROCEDURE — 6370000000 HC RX 637 (ALT 250 FOR IP): Performed by: PHYSICIAN ASSISTANT

## 2023-03-25 PROCEDURE — 81001 URINALYSIS AUTO W/SCOPE: CPT

## 2023-03-25 PROCEDURE — G0378 HOSPITAL OBSERVATION PER HR: HCPCS

## 2023-03-25 PROCEDURE — 6360000002 HC RX W HCPCS: Performed by: INTERNAL MEDICINE

## 2023-03-25 PROCEDURE — 82962 GLUCOSE BLOOD TEST: CPT

## 2023-03-25 PROCEDURE — 96372 THER/PROPH/DIAG INJ SC/IM: CPT

## 2023-03-25 PROCEDURE — 51701 INSERT BLADDER CATHETER: CPT

## 2023-03-25 PROCEDURE — 2580000003 HC RX 258: Performed by: INTERNAL MEDICINE

## 2023-03-25 RX ORDER — IBUPROFEN 400 MG/1
400 TABLET ORAL EVERY 6 HOURS PRN
Status: DISCONTINUED | OUTPATIENT
Start: 2023-03-25 | End: 2023-03-25

## 2023-03-25 RX ORDER — IBUPROFEN 400 MG/1
400 TABLET ORAL EVERY 8 HOURS PRN
Status: DISCONTINUED | OUTPATIENT
Start: 2023-03-25 | End: 2023-04-05 | Stop reason: HOSPADM

## 2023-03-25 RX ORDER — TRAMADOL HYDROCHLORIDE 50 MG/1
25 TABLET ORAL EVERY 6 HOURS PRN
Status: DISCONTINUED | OUTPATIENT
Start: 2023-03-25 | End: 2023-04-05 | Stop reason: HOSPADM

## 2023-03-25 RX ADMIN — SODIUM CHLORIDE, PRESERVATIVE FREE 10 ML: 5 INJECTION INTRAVENOUS at 09:54

## 2023-03-25 RX ADMIN — ACETAMINOPHEN 650 MG: 325 TABLET ORAL at 14:43

## 2023-03-25 RX ADMIN — CARVEDILOL 6.25 MG: 6.25 TABLET, FILM COATED ORAL at 17:15

## 2023-03-25 RX ADMIN — ENOXAPARIN SODIUM 40 MG: 100 INJECTION SUBCUTANEOUS at 09:52

## 2023-03-25 RX ADMIN — TRAMADOL HYDROCHLORIDE 25 MG: 50 TABLET ORAL at 17:16

## 2023-03-25 RX ADMIN — ATORVASTATIN CALCIUM 80 MG: 80 TABLET, FILM COATED ORAL at 21:05

## 2023-03-25 RX ADMIN — LISINOPRIL 10 MG: 5 TABLET ORAL at 09:53

## 2023-03-25 RX ADMIN — ASPIRIN 81 MG 81 MG: 81 TABLET ORAL at 09:53

## 2023-03-25 RX ADMIN — CLOPIDOGREL BISULFATE 75 MG: 75 TABLET ORAL at 09:53

## 2023-03-25 RX ADMIN — CARVEDILOL 6.25 MG: 6.25 TABLET, FILM COATED ORAL at 09:53

## 2023-03-25 RX ADMIN — SODIUM CHLORIDE, PRESERVATIVE FREE 10 ML: 5 INJECTION INTRAVENOUS at 21:05

## 2023-03-25 RX ADMIN — PANTOPRAZOLE SODIUM 40 MG: 40 TABLET, DELAYED RELEASE ORAL at 05:42

## 2023-03-25 RX ADMIN — LEVOTHYROXINE SODIUM 100 MCG: 0.1 TABLET ORAL at 05:42

## 2023-03-25 RX ADMIN — MEGESTROL ACETATE 40 MG: 40 TABLET ORAL at 09:53

## 2023-03-25 ASSESSMENT — PAIN DESCRIPTION - LOCATION: LOCATION: SHOULDER

## 2023-03-25 ASSESSMENT — PAIN SCALES - GENERAL
PAINLEVEL_OUTOF10: 2
PAINLEVEL_OUTOF10: 0

## 2023-03-26 LAB
GLUCOSE BLD STRIP.AUTO-MCNC: 137 MG/DL (ref 65–100)
GLUCOSE BLD STRIP.AUTO-MCNC: 138 MG/DL (ref 65–100)
GLUCOSE BLD STRIP.AUTO-MCNC: 164 MG/DL (ref 65–100)
GLUCOSE BLD STRIP.AUTO-MCNC: 211 MG/DL (ref 65–100)
MM INDURATION, POC: 0 MM (ref 0–5)
PPD, POC: NEGATIVE
SERVICE CMNT-IMP: ABNORMAL

## 2023-03-26 PROCEDURE — 6370000000 HC RX 637 (ALT 250 FOR IP): Performed by: PHYSICIAN ASSISTANT

## 2023-03-26 PROCEDURE — 6360000002 HC RX W HCPCS: Performed by: INTERNAL MEDICINE

## 2023-03-26 PROCEDURE — G0378 HOSPITAL OBSERVATION PER HR: HCPCS

## 2023-03-26 PROCEDURE — 6370000000 HC RX 637 (ALT 250 FOR IP): Performed by: INTERNAL MEDICINE

## 2023-03-26 PROCEDURE — 2580000003 HC RX 258: Performed by: INTERNAL MEDICINE

## 2023-03-26 PROCEDURE — 82962 GLUCOSE BLOOD TEST: CPT

## 2023-03-26 PROCEDURE — 6360000002 HC RX W HCPCS: Performed by: FAMILY MEDICINE

## 2023-03-26 PROCEDURE — 51702 INSERT TEMP BLADDER CATH: CPT

## 2023-03-26 PROCEDURE — 96375 TX/PRO/DX INJ NEW DRUG ADDON: CPT

## 2023-03-26 PROCEDURE — 96372 THER/PROPH/DIAG INJ SC/IM: CPT

## 2023-03-26 RX ORDER — HYDRALAZINE HYDROCHLORIDE 20 MG/ML
10 INJECTION INTRAMUSCULAR; INTRAVENOUS ONCE
Status: COMPLETED | OUTPATIENT
Start: 2023-03-26 | End: 2023-03-26

## 2023-03-26 RX ORDER — HYDRALAZINE HYDROCHLORIDE 25 MG/1
25 TABLET, FILM COATED ORAL EVERY 6 HOURS PRN
Status: DISCONTINUED | OUTPATIENT
Start: 2023-03-26 | End: 2023-04-05 | Stop reason: HOSPADM

## 2023-03-26 RX ORDER — HYDRALAZINE HYDROCHLORIDE 50 MG/1
25 TABLET, FILM COATED ORAL EVERY 8 HOURS PRN
Status: DISCONTINUED | OUTPATIENT
Start: 2023-03-26 | End: 2023-03-26

## 2023-03-26 RX ADMIN — ATORVASTATIN CALCIUM 80 MG: 80 TABLET, FILM COATED ORAL at 20:12

## 2023-03-26 RX ADMIN — CLOPIDOGREL BISULFATE 75 MG: 75 TABLET ORAL at 08:47

## 2023-03-26 RX ADMIN — PANTOPRAZOLE SODIUM 40 MG: 40 TABLET, DELAYED RELEASE ORAL at 05:42

## 2023-03-26 RX ADMIN — LEVOTHYROXINE SODIUM 100 MCG: 0.1 TABLET ORAL at 05:42

## 2023-03-26 RX ADMIN — SODIUM CHLORIDE, PRESERVATIVE FREE 5 ML: 5 INJECTION INTRAVENOUS at 20:13

## 2023-03-26 RX ADMIN — MEGESTROL ACETATE 40 MG: 40 TABLET ORAL at 08:47

## 2023-03-26 RX ADMIN — ASPIRIN 81 MG 81 MG: 81 TABLET ORAL at 08:47

## 2023-03-26 RX ADMIN — ENOXAPARIN SODIUM 40 MG: 100 INJECTION SUBCUTANEOUS at 08:47

## 2023-03-26 RX ADMIN — LISINOPRIL 10 MG: 5 TABLET ORAL at 08:47

## 2023-03-26 RX ADMIN — TRAMADOL HYDROCHLORIDE 25 MG: 50 TABLET ORAL at 20:19

## 2023-03-26 RX ADMIN — CARVEDILOL 6.25 MG: 6.25 TABLET, FILM COATED ORAL at 17:04

## 2023-03-26 RX ADMIN — CARVEDILOL 6.25 MG: 6.25 TABLET, FILM COATED ORAL at 08:47

## 2023-03-26 RX ADMIN — HYDRALAZINE HYDROCHLORIDE 25 MG: 50 TABLET, FILM COATED ORAL at 17:04

## 2023-03-26 RX ADMIN — HYDRALAZINE HYDROCHLORIDE 10 MG: 20 INJECTION INTRAMUSCULAR; INTRAVENOUS at 21:16

## 2023-03-26 RX ADMIN — TRAMADOL HYDROCHLORIDE 25 MG: 50 TABLET ORAL at 03:04

## 2023-03-26 RX ADMIN — SODIUM CHLORIDE, PRESERVATIVE FREE 5 ML: 5 INJECTION INTRAVENOUS at 08:48

## 2023-03-26 ASSESSMENT — PAIN SCALES - GENERAL
PAINLEVEL_OUTOF10: 0
PAINLEVEL_OUTOF10: 5
PAINLEVEL_OUTOF10: 0
PAINLEVEL_OUTOF10: 5
PAINLEVEL_OUTOF10: 0

## 2023-03-26 ASSESSMENT — PAIN DESCRIPTION - LOCATION
LOCATION: SHOULDER
LOCATION: SHOULDER

## 2023-03-26 ASSESSMENT — PAIN DESCRIPTION - PAIN TYPE
TYPE: CHRONIC PAIN
TYPE: CHRONIC PAIN

## 2023-03-26 ASSESSMENT — PAIN DESCRIPTION - ONSET
ONSET: GRADUAL
ONSET: GRADUAL

## 2023-03-26 ASSESSMENT — PAIN DESCRIPTION - DESCRIPTORS
DESCRIPTORS: ACHING
DESCRIPTORS: ACHING

## 2023-03-26 ASSESSMENT — PAIN DESCRIPTION - ORIENTATION
ORIENTATION: RIGHT
ORIENTATION: RIGHT

## 2023-03-26 ASSESSMENT — PAIN - FUNCTIONAL ASSESSMENT
PAIN_FUNCTIONAL_ASSESSMENT: PREVENTS OR INTERFERES SOME ACTIVE ACTIVITIES AND ADLS
PAIN_FUNCTIONAL_ASSESSMENT: PREVENTS OR INTERFERES SOME ACTIVE ACTIVITIES AND ADLS

## 2023-03-26 ASSESSMENT — PAIN DESCRIPTION - FREQUENCY
FREQUENCY: INTERMITTENT
FREQUENCY: INTERMITTENT

## 2023-03-27 LAB
GLUCOSE BLD STRIP.AUTO-MCNC: 171 MG/DL (ref 65–100)
GLUCOSE BLD STRIP.AUTO-MCNC: 178 MG/DL (ref 65–100)
GLUCOSE BLD STRIP.AUTO-MCNC: 180 MG/DL (ref 65–100)
GLUCOSE BLD STRIP.AUTO-MCNC: 183 MG/DL (ref 65–100)
SERVICE CMNT-IMP: ABNORMAL

## 2023-03-27 PROCEDURE — 6370000000 HC RX 637 (ALT 250 FOR IP): Performed by: INTERNAL MEDICINE

## 2023-03-27 PROCEDURE — 6360000002 HC RX W HCPCS: Performed by: INTERNAL MEDICINE

## 2023-03-27 PROCEDURE — 6370000000 HC RX 637 (ALT 250 FOR IP): Performed by: PHYSICIAN ASSISTANT

## 2023-03-27 PROCEDURE — G0378 HOSPITAL OBSERVATION PER HR: HCPCS

## 2023-03-27 PROCEDURE — 96372 THER/PROPH/DIAG INJ SC/IM: CPT

## 2023-03-27 PROCEDURE — 97530 THERAPEUTIC ACTIVITIES: CPT

## 2023-03-27 PROCEDURE — 2580000003 HC RX 258: Performed by: INTERNAL MEDICINE

## 2023-03-27 PROCEDURE — 82962 GLUCOSE BLOOD TEST: CPT

## 2023-03-27 RX ORDER — LANOLIN ALCOHOL/MO/W.PET/CERES
3 CREAM (GRAM) TOPICAL NIGHTLY
Status: DISCONTINUED | OUTPATIENT
Start: 2023-03-27 | End: 2023-04-05 | Stop reason: HOSPADM

## 2023-03-27 RX ORDER — DIVALPROEX SODIUM 125 MG/1
125 CAPSULE, COATED PELLETS ORAL EVERY 12 HOURS SCHEDULED
Status: DISCONTINUED | OUTPATIENT
Start: 2023-03-27 | End: 2023-03-29

## 2023-03-27 RX ADMIN — PANTOPRAZOLE SODIUM 40 MG: 40 TABLET, DELAYED RELEASE ORAL at 05:26

## 2023-03-27 RX ADMIN — CARVEDILOL 6.25 MG: 6.25 TABLET, FILM COATED ORAL at 09:24

## 2023-03-27 RX ADMIN — ENOXAPARIN SODIUM 40 MG: 100 INJECTION SUBCUTANEOUS at 09:24

## 2023-03-27 RX ADMIN — CARVEDILOL 6.25 MG: 6.25 TABLET, FILM COATED ORAL at 17:51

## 2023-03-27 RX ADMIN — SODIUM CHLORIDE, PRESERVATIVE FREE 5 ML: 5 INJECTION INTRAVENOUS at 20:27

## 2023-03-27 RX ADMIN — LEVOTHYROXINE SODIUM 100 MCG: 0.1 TABLET ORAL at 05:26

## 2023-03-27 RX ADMIN — CLOPIDOGREL BISULFATE 75 MG: 75 TABLET ORAL at 09:24

## 2023-03-27 RX ADMIN — MEGESTROL ACETATE 40 MG: 40 TABLET ORAL at 09:24

## 2023-03-27 RX ADMIN — TRAMADOL HYDROCHLORIDE 25 MG: 50 TABLET ORAL at 17:51

## 2023-03-27 RX ADMIN — ASPIRIN 81 MG 81 MG: 81 TABLET ORAL at 09:24

## 2023-03-27 RX ADMIN — SODIUM CHLORIDE: 9 INJECTION, SOLUTION INTRAVENOUS at 22:54

## 2023-03-27 RX ADMIN — ATORVASTATIN CALCIUM 80 MG: 80 TABLET, FILM COATED ORAL at 20:26

## 2023-03-27 RX ADMIN — SODIUM CHLORIDE, PRESERVATIVE FREE 5 ML: 5 INJECTION INTRAVENOUS at 09:26

## 2023-03-27 RX ADMIN — Medication 3 MG: at 20:26

## 2023-03-27 RX ADMIN — LISINOPRIL 10 MG: 5 TABLET ORAL at 09:24

## 2023-03-27 RX ADMIN — DIVALPROEX SODIUM 125 MG: 125 CAPSULE, COATED PELLETS ORAL at 20:26

## 2023-03-27 ASSESSMENT — PAIN SCALES - GENERAL
PAINLEVEL_OUTOF10: 0
PAINLEVEL_OUTOF10: 0

## 2023-03-27 NOTE — WOUND CARE
Patient seen for sacral area wound. Patient sitting in chair, pct assisted wound nurse to stand patient and evaluate wound. Noted 2.5cm gray based shallow wound with adjacent 1cm area that is pink. Minimal serosanguinous drainage on foam pad. Pressure and moisture issues, present on admission. Check frequently for incontinence and change brief as needed. Keep turned on sides while in the bed. Will add foam chair cushion for when she is sitting. Continue foam dressing every 3 days and prn. Wound team will monitor and adjust treatment plan as needed.

## 2023-03-28 LAB
GLUCOSE BLD STRIP.AUTO-MCNC: 165 MG/DL (ref 65–100)
GLUCOSE BLD STRIP.AUTO-MCNC: 169 MG/DL (ref 65–100)
GLUCOSE BLD STRIP.AUTO-MCNC: 185 MG/DL (ref 65–100)
GLUCOSE BLD STRIP.AUTO-MCNC: 211 MG/DL (ref 65–100)
SERVICE CMNT-IMP: ABNORMAL

## 2023-03-28 PROCEDURE — G0378 HOSPITAL OBSERVATION PER HR: HCPCS

## 2023-03-28 PROCEDURE — 82962 GLUCOSE BLOOD TEST: CPT

## 2023-03-28 PROCEDURE — 97530 THERAPEUTIC ACTIVITIES: CPT

## 2023-03-28 PROCEDURE — 6360000002 HC RX W HCPCS: Performed by: INTERNAL MEDICINE

## 2023-03-28 PROCEDURE — 6370000000 HC RX 637 (ALT 250 FOR IP): Performed by: INTERNAL MEDICINE

## 2023-03-28 PROCEDURE — 2580000003 HC RX 258: Performed by: INTERNAL MEDICINE

## 2023-03-28 PROCEDURE — 51798 US URINE CAPACITY MEASURE: CPT

## 2023-03-28 PROCEDURE — 6370000000 HC RX 637 (ALT 250 FOR IP): Performed by: PHYSICIAN ASSISTANT

## 2023-03-28 PROCEDURE — 1100000000 HC RM PRIVATE

## 2023-03-28 PROCEDURE — 96372 THER/PROPH/DIAG INJ SC/IM: CPT

## 2023-03-28 PROCEDURE — 94761 N-INVAS EAR/PLS OXIMETRY MLT: CPT

## 2023-03-28 RX ORDER — LIDOCAINE 4 G/G
1 PATCH TOPICAL DAILY
Status: DISCONTINUED | OUTPATIENT
Start: 2023-03-28 | End: 2023-04-05 | Stop reason: HOSPADM

## 2023-03-28 RX ADMIN — Medication 3 MG: at 23:18

## 2023-03-28 RX ADMIN — MEGESTROL ACETATE 40 MG: 40 TABLET ORAL at 08:42

## 2023-03-28 RX ADMIN — TRAMADOL HYDROCHLORIDE 25 MG: 50 TABLET ORAL at 13:05

## 2023-03-28 RX ADMIN — INSULIN LISPRO 2 UNITS: 100 INJECTION, SOLUTION INTRAVENOUS; SUBCUTANEOUS at 08:44

## 2023-03-28 RX ADMIN — TRAMADOL HYDROCHLORIDE 25 MG: 50 TABLET ORAL at 23:18

## 2023-03-28 RX ADMIN — LEVOTHYROXINE SODIUM 100 MCG: 0.1 TABLET ORAL at 05:32

## 2023-03-28 RX ADMIN — IBUPROFEN 400 MG: 400 TABLET, FILM COATED ORAL at 05:44

## 2023-03-28 RX ADMIN — ATORVASTATIN CALCIUM 80 MG: 80 TABLET, FILM COATED ORAL at 23:18

## 2023-03-28 RX ADMIN — DIVALPROEX SODIUM 125 MG: 125 CAPSULE, COATED PELLETS ORAL at 08:42

## 2023-03-28 RX ADMIN — DIVALPROEX SODIUM 125 MG: 125 CAPSULE, COATED PELLETS ORAL at 23:18

## 2023-03-28 RX ADMIN — PANTOPRAZOLE SODIUM 40 MG: 40 TABLET, DELAYED RELEASE ORAL at 05:32

## 2023-03-28 RX ADMIN — ASPIRIN 81 MG 81 MG: 81 TABLET ORAL at 08:42

## 2023-03-28 RX ADMIN — SODIUM CHLORIDE, PRESERVATIVE FREE 10 ML: 5 INJECTION INTRAVENOUS at 23:19

## 2023-03-28 RX ADMIN — CLOPIDOGREL BISULFATE 75 MG: 75 TABLET ORAL at 08:42

## 2023-03-28 RX ADMIN — SODIUM CHLORIDE, PRESERVATIVE FREE 10 ML: 5 INJECTION INTRAVENOUS at 09:12

## 2023-03-28 RX ADMIN — ENOXAPARIN SODIUM 40 MG: 100 INJECTION SUBCUTANEOUS at 08:45

## 2023-03-28 RX ADMIN — CARVEDILOL 6.25 MG: 6.25 TABLET, FILM COATED ORAL at 17:29

## 2023-03-28 ASSESSMENT — PAIN SCALES - GENERAL
PAINLEVEL_OUTOF10: 6
PAINLEVEL_OUTOF10: 7
PAINLEVEL_OUTOF10: 0
PAINLEVEL_OUTOF10: 3
PAINLEVEL_OUTOF10: 0

## 2023-03-28 ASSESSMENT — PAIN DESCRIPTION - LOCATION
LOCATION: SHOULDER

## 2023-03-28 ASSESSMENT — PAIN DESCRIPTION - FREQUENCY
FREQUENCY: INTERMITTENT
FREQUENCY: INTERMITTENT

## 2023-03-28 ASSESSMENT — PAIN DESCRIPTION - DESCRIPTORS
DESCRIPTORS: THROBBING;ACHING
DESCRIPTORS: ACHING
DESCRIPTORS: ACHING

## 2023-03-28 ASSESSMENT — PAIN DESCRIPTION - ORIENTATION
ORIENTATION: RIGHT

## 2023-03-28 ASSESSMENT — PAIN DESCRIPTION - ONSET
ONSET: GRADUAL
ONSET: GRADUAL

## 2023-03-28 ASSESSMENT — PAIN DESCRIPTION - PAIN TYPE
TYPE: ACUTE PAIN
TYPE: CHRONIC PAIN

## 2023-03-29 LAB
GLUCOSE BLD STRIP.AUTO-MCNC: 132 MG/DL (ref 65–100)
GLUCOSE BLD STRIP.AUTO-MCNC: 138 MG/DL (ref 65–100)
GLUCOSE BLD STRIP.AUTO-MCNC: 148 MG/DL (ref 65–100)
GLUCOSE BLD STRIP.AUTO-MCNC: 195 MG/DL (ref 65–100)
SERVICE CMNT-IMP: ABNORMAL

## 2023-03-29 PROCEDURE — 6370000000 HC RX 637 (ALT 250 FOR IP): Performed by: INTERNAL MEDICINE

## 2023-03-29 PROCEDURE — 6360000002 HC RX W HCPCS: Performed by: INTERNAL MEDICINE

## 2023-03-29 PROCEDURE — 2580000003 HC RX 258: Performed by: INTERNAL MEDICINE

## 2023-03-29 PROCEDURE — 82962 GLUCOSE BLOOD TEST: CPT

## 2023-03-29 PROCEDURE — G0378 HOSPITAL OBSERVATION PER HR: HCPCS

## 2023-03-29 PROCEDURE — 96372 THER/PROPH/DIAG INJ SC/IM: CPT

## 2023-03-29 PROCEDURE — 6370000000 HC RX 637 (ALT 250 FOR IP): Performed by: PHYSICIAN ASSISTANT

## 2023-03-29 PROCEDURE — 97530 THERAPEUTIC ACTIVITIES: CPT

## 2023-03-29 RX ORDER — DIVALPROEX SODIUM 125 MG/1
250 CAPSULE, COATED PELLETS ORAL EVERY 12 HOURS SCHEDULED
Status: DISCONTINUED | OUTPATIENT
Start: 2023-03-29 | End: 2023-04-05 | Stop reason: HOSPADM

## 2023-03-29 RX ORDER — DEXTROSE MONOHYDRATE 100 MG/ML
INJECTION, SOLUTION INTRAVENOUS CONTINUOUS PRN
Status: CANCELLED | OUTPATIENT
Start: 2023-03-29

## 2023-03-29 RX ADMIN — MEGESTROL ACETATE 40 MG: 40 TABLET ORAL at 09:19

## 2023-03-29 RX ADMIN — LISINOPRIL 10 MG: 5 TABLET ORAL at 09:15

## 2023-03-29 RX ADMIN — Medication 3 MG: at 21:41

## 2023-03-29 RX ADMIN — ATORVASTATIN CALCIUM 80 MG: 80 TABLET, FILM COATED ORAL at 21:41

## 2023-03-29 RX ADMIN — LEVOTHYROXINE SODIUM 100 MCG: 0.1 TABLET ORAL at 06:46

## 2023-03-29 RX ADMIN — SODIUM CHLORIDE, PRESERVATIVE FREE 5 ML: 5 INJECTION INTRAVENOUS at 21:41

## 2023-03-29 RX ADMIN — DIVALPROEX SODIUM 250 MG: 125 CAPSULE, COATED PELLETS ORAL at 21:40

## 2023-03-29 RX ADMIN — CLOPIDOGREL BISULFATE 75 MG: 75 TABLET ORAL at 09:15

## 2023-03-29 RX ADMIN — DIVALPROEX SODIUM 125 MG: 125 CAPSULE, COATED PELLETS ORAL at 09:15

## 2023-03-29 RX ADMIN — SODIUM CHLORIDE, PRESERVATIVE FREE 10 ML: 5 INJECTION INTRAVENOUS at 09:23

## 2023-03-29 RX ADMIN — PANTOPRAZOLE SODIUM 40 MG: 40 TABLET, DELAYED RELEASE ORAL at 06:46

## 2023-03-29 RX ADMIN — TRAMADOL HYDROCHLORIDE 25 MG: 50 TABLET ORAL at 09:15

## 2023-03-29 RX ADMIN — ENOXAPARIN SODIUM 40 MG: 100 INJECTION SUBCUTANEOUS at 09:15

## 2023-03-29 RX ADMIN — CARVEDILOL 6.25 MG: 6.25 TABLET, FILM COATED ORAL at 09:19

## 2023-03-29 RX ADMIN — ASPIRIN 81 MG 81 MG: 81 TABLET ORAL at 09:15

## 2023-03-29 RX ADMIN — CARVEDILOL 6.25 MG: 6.25 TABLET, FILM COATED ORAL at 17:41

## 2023-03-29 ASSESSMENT — PAIN DESCRIPTION - DESCRIPTORS: DESCRIPTORS: ACHING;THROBBING

## 2023-03-29 ASSESSMENT — PAIN SCALES - GENERAL
PAINLEVEL_OUTOF10: 6
PAINLEVEL_OUTOF10: 0

## 2023-03-29 ASSESSMENT — PAIN DESCRIPTION - ORIENTATION: ORIENTATION: RIGHT

## 2023-03-29 ASSESSMENT — PAIN DESCRIPTION - LOCATION: LOCATION: SHOULDER

## 2023-03-30 LAB
ANION GAP SERPL CALC-SCNC: ABNORMAL MMOL/L (ref 2–11)
BASOPHILS # BLD: 0 K/UL (ref 0–0.2)
BASOPHILS NFR BLD: 0 % (ref 0–2)
BUN SERPL-MCNC: 11 MG/DL (ref 8–23)
CALCIUM SERPL-MCNC: 8.9 MG/DL (ref 8.3–10.4)
CHLORIDE SERPL-SCNC: 113 MMOL/L (ref 101–110)
CO2 SERPL-SCNC: 27 MMOL/L (ref 21–32)
CREAT SERPL-MCNC: 0.9 MG/DL (ref 0.6–1)
DIFFERENTIAL METHOD BLD: ABNORMAL
EOSINOPHIL # BLD: 0.2 K/UL (ref 0–0.8)
EOSINOPHIL NFR BLD: 3 % (ref 0.5–7.8)
ERYTHROCYTE [DISTWIDTH] IN BLOOD BY AUTOMATED COUNT: 16.1 % (ref 11.9–14.6)
GLUCOSE BLD STRIP.AUTO-MCNC: 126 MG/DL (ref 65–100)
GLUCOSE BLD STRIP.AUTO-MCNC: 150 MG/DL (ref 65–100)
GLUCOSE BLD STRIP.AUTO-MCNC: 171 MG/DL (ref 65–100)
GLUCOSE BLD STRIP.AUTO-MCNC: 204 MG/DL (ref 65–100)
GLUCOSE SERPL-MCNC: 129 MG/DL (ref 65–100)
HCT VFR BLD AUTO: 28.9 % (ref 35.8–46.3)
HGB BLD-MCNC: 9.3 G/DL (ref 11.7–15.4)
IMM GRANULOCYTES # BLD AUTO: 0 K/UL (ref 0–0.5)
IMM GRANULOCYTES NFR BLD AUTO: 0 % (ref 0–5)
LYMPHOCYTES # BLD: 1.2 K/UL (ref 0.5–4.6)
LYMPHOCYTES NFR BLD: 17 % (ref 13–44)
MCH RBC QN AUTO: 30.2 PG (ref 26.1–32.9)
MCHC RBC AUTO-ENTMCNC: 32.2 G/DL (ref 31.4–35)
MCV RBC AUTO: 93.8 FL (ref 82–102)
MONOCYTES # BLD: 0.4 K/UL (ref 0.1–1.3)
MONOCYTES NFR BLD: 5 % (ref 4–12)
NEUTS SEG # BLD: 5.7 K/UL (ref 1.7–8.2)
NEUTS SEG NFR BLD: 75 % (ref 43–78)
NRBC # BLD: 0 K/UL (ref 0–0.2)
PLATELET # BLD AUTO: 230 K/UL (ref 150–450)
PMV BLD AUTO: 10.2 FL (ref 9.4–12.3)
POTASSIUM SERPL-SCNC: 3.6 MMOL/L (ref 3.5–5.1)
RBC # BLD AUTO: 3.08 M/UL (ref 4.05–5.2)
SERVICE CMNT-IMP: ABNORMAL
SODIUM SERPL-SCNC: 139 MMOL/L (ref 133–143)
WBC # BLD AUTO: 7.5 K/UL (ref 4.3–11.1)

## 2023-03-30 PROCEDURE — G0378 HOSPITAL OBSERVATION PER HR: HCPCS

## 2023-03-30 PROCEDURE — 80048 BASIC METABOLIC PNL TOTAL CA: CPT

## 2023-03-30 PROCEDURE — 6370000000 HC RX 637 (ALT 250 FOR IP): Performed by: INTERNAL MEDICINE

## 2023-03-30 PROCEDURE — 96372 THER/PROPH/DIAG INJ SC/IM: CPT

## 2023-03-30 PROCEDURE — 6360000002 HC RX W HCPCS: Performed by: INTERNAL MEDICINE

## 2023-03-30 PROCEDURE — 2580000003 HC RX 258: Performed by: INTERNAL MEDICINE

## 2023-03-30 PROCEDURE — 36415 COLL VENOUS BLD VENIPUNCTURE: CPT

## 2023-03-30 PROCEDURE — 82962 GLUCOSE BLOOD TEST: CPT

## 2023-03-30 PROCEDURE — 6370000000 HC RX 637 (ALT 250 FOR IP): Performed by: PHYSICIAN ASSISTANT

## 2023-03-30 PROCEDURE — 85025 COMPLETE CBC W/AUTO DIFF WBC: CPT

## 2023-03-30 PROCEDURE — 97530 THERAPEUTIC ACTIVITIES: CPT

## 2023-03-30 RX ORDER — AMLODIPINE BESYLATE 10 MG/1
10 TABLET ORAL DAILY
COMMUNITY

## 2023-03-30 RX ADMIN — ENOXAPARIN SODIUM 40 MG: 100 INJECTION SUBCUTANEOUS at 09:47

## 2023-03-30 RX ADMIN — Medication 3 MG: at 21:54

## 2023-03-30 RX ADMIN — DIVALPROEX SODIUM 250 MG: 125 CAPSULE, COATED PELLETS ORAL at 21:53

## 2023-03-30 RX ADMIN — LEVOTHYROXINE SODIUM 100 MCG: 0.1 TABLET ORAL at 05:30

## 2023-03-30 RX ADMIN — ATORVASTATIN CALCIUM 80 MG: 80 TABLET, FILM COATED ORAL at 21:54

## 2023-03-30 RX ADMIN — LISINOPRIL 10 MG: 5 TABLET ORAL at 09:47

## 2023-03-30 RX ADMIN — SODIUM CHLORIDE, PRESERVATIVE FREE 10 ML: 5 INJECTION INTRAVENOUS at 09:48

## 2023-03-30 RX ADMIN — DIVALPROEX SODIUM 250 MG: 125 CAPSULE, COATED PELLETS ORAL at 09:46

## 2023-03-30 RX ADMIN — MEGESTROL ACETATE 40 MG: 40 TABLET ORAL at 09:47

## 2023-03-30 RX ADMIN — SODIUM CHLORIDE, PRESERVATIVE FREE 10 ML: 5 INJECTION INTRAVENOUS at 21:54

## 2023-03-30 RX ADMIN — INSULIN LISPRO 2 UNITS: 100 INJECTION, SOLUTION INTRAVENOUS; SUBCUTANEOUS at 13:02

## 2023-03-30 RX ADMIN — CLOPIDOGREL BISULFATE 75 MG: 75 TABLET ORAL at 09:46

## 2023-03-30 RX ADMIN — TRAMADOL HYDROCHLORIDE 25 MG: 50 TABLET ORAL at 09:51

## 2023-03-30 RX ADMIN — TRAMADOL HYDROCHLORIDE 25 MG: 50 TABLET ORAL at 17:49

## 2023-03-30 RX ADMIN — ASPIRIN 81 MG 81 MG: 81 TABLET ORAL at 09:47

## 2023-03-30 RX ADMIN — PANTOPRAZOLE SODIUM 40 MG: 40 TABLET, DELAYED RELEASE ORAL at 05:30

## 2023-03-30 ASSESSMENT — PAIN SCALES - GENERAL
PAINLEVEL_OUTOF10: 0
PAINLEVEL_OUTOF10: 0
PAINLEVEL_OUTOF10: 7
PAINLEVEL_OUTOF10: 0
PAINLEVEL_OUTOF10: 7

## 2023-03-30 ASSESSMENT — PAIN DESCRIPTION - DESCRIPTORS
DESCRIPTORS: ACHING;THROBBING
DESCRIPTORS: ACHING;THROBBING

## 2023-03-30 ASSESSMENT — PAIN DESCRIPTION - ORIENTATION
ORIENTATION: POSTERIOR
ORIENTATION: RIGHT

## 2023-03-30 ASSESSMENT — PAIN DESCRIPTION - LOCATION
LOCATION: SHOULDER
LOCATION: SHOULDER

## 2023-03-31 LAB
GLUCOSE BLD STRIP.AUTO-MCNC: 128 MG/DL (ref 65–100)
GLUCOSE BLD STRIP.AUTO-MCNC: 128 MG/DL (ref 65–100)
GLUCOSE BLD STRIP.AUTO-MCNC: 190 MG/DL (ref 65–100)
GLUCOSE BLD STRIP.AUTO-MCNC: 215 MG/DL (ref 65–100)
SERVICE CMNT-IMP: ABNORMAL

## 2023-03-31 PROCEDURE — 82962 GLUCOSE BLOOD TEST: CPT

## 2023-03-31 PROCEDURE — 97530 THERAPEUTIC ACTIVITIES: CPT

## 2023-03-31 PROCEDURE — 6370000000 HC RX 637 (ALT 250 FOR IP): Performed by: PHYSICIAN ASSISTANT

## 2023-03-31 PROCEDURE — G0378 HOSPITAL OBSERVATION PER HR: HCPCS

## 2023-03-31 PROCEDURE — 6370000000 HC RX 637 (ALT 250 FOR IP): Performed by: INTERNAL MEDICINE

## 2023-03-31 PROCEDURE — 2580000003 HC RX 258: Performed by: INTERNAL MEDICINE

## 2023-03-31 PROCEDURE — 96372 THER/PROPH/DIAG INJ SC/IM: CPT

## 2023-03-31 PROCEDURE — 6360000002 HC RX W HCPCS: Performed by: INTERNAL MEDICINE

## 2023-03-31 RX ADMIN — ENOXAPARIN SODIUM 40 MG: 100 INJECTION SUBCUTANEOUS at 09:20

## 2023-03-31 RX ADMIN — SODIUM CHLORIDE, PRESERVATIVE FREE 5 ML: 5 INJECTION INTRAVENOUS at 09:21

## 2023-03-31 RX ADMIN — ATORVASTATIN CALCIUM 80 MG: 80 TABLET, FILM COATED ORAL at 21:20

## 2023-03-31 RX ADMIN — TRAMADOL HYDROCHLORIDE 25 MG: 50 TABLET ORAL at 12:42

## 2023-03-31 RX ADMIN — LISINOPRIL 10 MG: 5 TABLET ORAL at 09:17

## 2023-03-31 RX ADMIN — SODIUM CHLORIDE, PRESERVATIVE FREE 5 ML: 5 INJECTION INTRAVENOUS at 21:21

## 2023-03-31 RX ADMIN — PANTOPRAZOLE SODIUM 40 MG: 40 TABLET, DELAYED RELEASE ORAL at 05:32

## 2023-03-31 RX ADMIN — DIVALPROEX SODIUM 250 MG: 125 CAPSULE, COATED PELLETS ORAL at 09:21

## 2023-03-31 RX ADMIN — TRAMADOL HYDROCHLORIDE 25 MG: 50 TABLET ORAL at 21:20

## 2023-03-31 RX ADMIN — IBUPROFEN 400 MG: 400 TABLET, FILM COATED ORAL at 14:00

## 2023-03-31 RX ADMIN — LEVOTHYROXINE SODIUM 100 MCG: 0.1 TABLET ORAL at 05:32

## 2023-03-31 RX ADMIN — INSULIN LISPRO 2 UNITS: 100 INJECTION, SOLUTION INTRAVENOUS; SUBCUTANEOUS at 16:36

## 2023-03-31 RX ADMIN — CLOPIDOGREL BISULFATE 75 MG: 75 TABLET ORAL at 09:15

## 2023-03-31 RX ADMIN — DIVALPROEX SODIUM 250 MG: 125 CAPSULE, COATED PELLETS ORAL at 21:21

## 2023-03-31 RX ADMIN — ACETAMINOPHEN 650 MG: 325 TABLET ORAL at 09:18

## 2023-03-31 RX ADMIN — Medication 3 MG: at 21:20

## 2023-03-31 RX ADMIN — ACETAMINOPHEN 650 MG: 325 TABLET ORAL at 16:35

## 2023-03-31 RX ADMIN — MEGESTROL ACETATE 40 MG: 40 TABLET ORAL at 09:20

## 2023-03-31 RX ADMIN — ASPIRIN 81 MG 81 MG: 81 TABLET ORAL at 09:15

## 2023-03-31 ASSESSMENT — PAIN DESCRIPTION - LOCATION
LOCATION: ARM
LOCATION: HEAD
LOCATION: SHOULDER

## 2023-03-31 ASSESSMENT — PAIN SCALES - GENERAL
PAINLEVEL_OUTOF10: 5
PAINLEVEL_OUTOF10: 0
PAINLEVEL_OUTOF10: 0
PAINLEVEL_OUTOF10: 3
PAINLEVEL_OUTOF10: 0
PAINLEVEL_OUTOF10: 3
PAINLEVEL_OUTOF10: 0
PAINLEVEL_OUTOF10: 2
PAINLEVEL_OUTOF10: 3
PAINLEVEL_OUTOF10: 4

## 2023-03-31 ASSESSMENT — PAIN DESCRIPTION - FREQUENCY
FREQUENCY: INTERMITTENT

## 2023-03-31 ASSESSMENT — PAIN DESCRIPTION - PAIN TYPE
TYPE: ACUTE PAIN

## 2023-03-31 ASSESSMENT — PAIN DESCRIPTION - DESCRIPTORS
DESCRIPTORS: TENDER
DESCRIPTORS: THROBBING;SORE
DESCRIPTORS: THROBBING
DESCRIPTORS: DISCOMFORT
DESCRIPTORS: DISCOMFORT;THROBBING

## 2023-03-31 ASSESSMENT — PAIN DESCRIPTION - ORIENTATION
ORIENTATION: RIGHT
ORIENTATION: MID

## 2023-03-31 ASSESSMENT — PAIN - FUNCTIONAL ASSESSMENT
PAIN_FUNCTIONAL_ASSESSMENT: PREVENTS OR INTERFERES SOME ACTIVE ACTIVITIES AND ADLS
PAIN_FUNCTIONAL_ASSESSMENT: ACTIVITIES ARE NOT PREVENTED
PAIN_FUNCTIONAL_ASSESSMENT: PREVENTS OR INTERFERES SOME ACTIVE ACTIVITIES AND ADLS
PAIN_FUNCTIONAL_ASSESSMENT: PREVENTS OR INTERFERES WITH MANY ACTIVE NOT PASSIVE ACTIVITIES

## 2023-03-31 ASSESSMENT — PAIN DESCRIPTION - ONSET
ONSET: GRADUAL

## 2023-04-01 LAB
GLUCOSE BLD STRIP.AUTO-MCNC: 105 MG/DL (ref 65–100)
GLUCOSE BLD STRIP.AUTO-MCNC: 128 MG/DL (ref 65–100)
GLUCOSE BLD STRIP.AUTO-MCNC: 139 MG/DL (ref 65–100)
GLUCOSE BLD STRIP.AUTO-MCNC: 212 MG/DL (ref 65–100)
SERVICE CMNT-IMP: ABNORMAL

## 2023-04-01 PROCEDURE — 2580000003 HC RX 258: Performed by: INTERNAL MEDICINE

## 2023-04-01 PROCEDURE — 6370000000 HC RX 637 (ALT 250 FOR IP): Performed by: INTERNAL MEDICINE

## 2023-04-01 PROCEDURE — 96372 THER/PROPH/DIAG INJ SC/IM: CPT

## 2023-04-01 PROCEDURE — 82962 GLUCOSE BLOOD TEST: CPT

## 2023-04-01 PROCEDURE — 6370000000 HC RX 637 (ALT 250 FOR IP): Performed by: PHYSICIAN ASSISTANT

## 2023-04-01 PROCEDURE — G0378 HOSPITAL OBSERVATION PER HR: HCPCS

## 2023-04-01 PROCEDURE — 6360000002 HC RX W HCPCS: Performed by: INTERNAL MEDICINE

## 2023-04-01 RX ADMIN — DIVALPROEX SODIUM 250 MG: 125 CAPSULE, COATED PELLETS ORAL at 21:12

## 2023-04-01 RX ADMIN — LEVOTHYROXINE SODIUM 100 MCG: 0.1 TABLET ORAL at 06:08

## 2023-04-01 RX ADMIN — Medication 3 MG: at 21:13

## 2023-04-01 RX ADMIN — CLOPIDOGREL BISULFATE 75 MG: 75 TABLET ORAL at 12:06

## 2023-04-01 RX ADMIN — TRAMADOL HYDROCHLORIDE 25 MG: 50 TABLET ORAL at 12:30

## 2023-04-01 RX ADMIN — ASPIRIN 81 MG 81 MG: 81 TABLET ORAL at 12:06

## 2023-04-01 RX ADMIN — PANTOPRAZOLE SODIUM 40 MG: 40 TABLET, DELAYED RELEASE ORAL at 06:08

## 2023-04-01 RX ADMIN — SODIUM CHLORIDE, PRESERVATIVE FREE 5 ML: 5 INJECTION INTRAVENOUS at 21:18

## 2023-04-01 RX ADMIN — DIVALPROEX SODIUM 250 MG: 125 CAPSULE, COATED PELLETS ORAL at 12:05

## 2023-04-01 RX ADMIN — ATORVASTATIN CALCIUM 80 MG: 80 TABLET, FILM COATED ORAL at 21:13

## 2023-04-01 RX ADMIN — SODIUM CHLORIDE, PRESERVATIVE FREE 5 ML: 5 INJECTION INTRAVENOUS at 12:07

## 2023-04-01 RX ADMIN — ACETAMINOPHEN 650 MG: 325 TABLET ORAL at 15:25

## 2023-04-01 RX ADMIN — ENOXAPARIN SODIUM 40 MG: 100 INJECTION SUBCUTANEOUS at 12:07

## 2023-04-01 RX ADMIN — LISINOPRIL 10 MG: 5 TABLET ORAL at 12:12

## 2023-04-01 RX ADMIN — MEGESTROL ACETATE 40 MG: 40 TABLET ORAL at 12:12

## 2023-04-01 RX ADMIN — TRAMADOL HYDROCHLORIDE 25 MG: 50 TABLET ORAL at 21:27

## 2023-04-01 ASSESSMENT — PAIN - FUNCTIONAL ASSESSMENT
PAIN_FUNCTIONAL_ASSESSMENT: PREVENTS OR INTERFERES SOME ACTIVE ACTIVITIES AND ADLS
PAIN_FUNCTIONAL_ASSESSMENT: PREVENTS OR INTERFERES WITH MANY ACTIVE NOT PASSIVE ACTIVITIES
PAIN_FUNCTIONAL_ASSESSMENT: PREVENTS OR INTERFERES WITH MANY ACTIVE NOT PASSIVE ACTIVITIES

## 2023-04-01 ASSESSMENT — PAIN DESCRIPTION - LOCATION
LOCATION: ARM
LOCATION: ARM
LOCATION: SHOULDER

## 2023-04-01 ASSESSMENT — PAIN DESCRIPTION - DESCRIPTORS
DESCRIPTORS: ACHING
DESCRIPTORS: THROBBING
DESCRIPTORS: DISCOMFORT

## 2023-04-01 ASSESSMENT — PAIN SCALES - GENERAL
PAINLEVEL_OUTOF10: 6
PAINLEVEL_OUTOF10: 3
PAINLEVEL_OUTOF10: 7
PAINLEVEL_OUTOF10: 0

## 2023-04-01 ASSESSMENT — PAIN DESCRIPTION - ORIENTATION
ORIENTATION: RIGHT

## 2023-04-02 LAB
GLUCOSE BLD STRIP.AUTO-MCNC: 143 MG/DL (ref 65–100)
GLUCOSE BLD STRIP.AUTO-MCNC: 161 MG/DL (ref 65–100)
GLUCOSE BLD STRIP.AUTO-MCNC: 164 MG/DL (ref 65–100)
GLUCOSE BLD STRIP.AUTO-MCNC: 166 MG/DL (ref 65–100)
SERVICE CMNT-IMP: ABNORMAL

## 2023-04-02 PROCEDURE — 6370000000 HC RX 637 (ALT 250 FOR IP): Performed by: INTERNAL MEDICINE

## 2023-04-02 PROCEDURE — 96372 THER/PROPH/DIAG INJ SC/IM: CPT

## 2023-04-02 PROCEDURE — 2580000003 HC RX 258: Performed by: INTERNAL MEDICINE

## 2023-04-02 PROCEDURE — 82962 GLUCOSE BLOOD TEST: CPT

## 2023-04-02 PROCEDURE — G0378 HOSPITAL OBSERVATION PER HR: HCPCS

## 2023-04-02 PROCEDURE — 6360000002 HC RX W HCPCS: Performed by: INTERNAL MEDICINE

## 2023-04-02 RX ADMIN — ASPIRIN 81 MG 81 MG: 81 TABLET ORAL at 10:21

## 2023-04-02 RX ADMIN — DIVALPROEX SODIUM 250 MG: 125 CAPSULE, COATED PELLETS ORAL at 21:24

## 2023-04-02 RX ADMIN — SODIUM CHLORIDE, PRESERVATIVE FREE 10 ML: 5 INJECTION INTRAVENOUS at 10:22

## 2023-04-02 RX ADMIN — MEGESTROL ACETATE 40 MG: 40 TABLET ORAL at 10:21

## 2023-04-02 RX ADMIN — ENOXAPARIN SODIUM 40 MG: 100 INJECTION SUBCUTANEOUS at 10:21

## 2023-04-02 RX ADMIN — DIVALPROEX SODIUM 250 MG: 125 CAPSULE, COATED PELLETS ORAL at 10:21

## 2023-04-02 RX ADMIN — ATORVASTATIN CALCIUM 80 MG: 80 TABLET, FILM COATED ORAL at 21:24

## 2023-04-02 RX ADMIN — LISINOPRIL 10 MG: 5 TABLET ORAL at 10:21

## 2023-04-02 RX ADMIN — CLOPIDOGREL BISULFATE 75 MG: 75 TABLET ORAL at 10:21

## 2023-04-02 RX ADMIN — LEVOTHYROXINE SODIUM 100 MCG: 0.1 TABLET ORAL at 05:39

## 2023-04-02 RX ADMIN — Medication 3 MG: at 21:24

## 2023-04-02 RX ADMIN — POLYETHYLENE GLYCOL 3350 17 G: 17 POWDER, FOR SOLUTION ORAL at 15:27

## 2023-04-02 RX ADMIN — ACETAMINOPHEN 650 MG: 325 TABLET ORAL at 12:21

## 2023-04-02 RX ADMIN — PANTOPRAZOLE SODIUM 40 MG: 40 TABLET, DELAYED RELEASE ORAL at 05:39

## 2023-04-02 RX ADMIN — SODIUM CHLORIDE, PRESERVATIVE FREE 10 ML: 5 INJECTION INTRAVENOUS at 21:31

## 2023-04-02 ASSESSMENT — PAIN DESCRIPTION - ONSET: ONSET: GRADUAL

## 2023-04-02 ASSESSMENT — PAIN DESCRIPTION - DESCRIPTORS: DESCRIPTORS: THROBBING

## 2023-04-02 ASSESSMENT — PAIN DESCRIPTION - LOCATION: LOCATION: ARM

## 2023-04-02 ASSESSMENT — PAIN DESCRIPTION - FREQUENCY: FREQUENCY: INTERMITTENT

## 2023-04-02 ASSESSMENT — PAIN - FUNCTIONAL ASSESSMENT: PAIN_FUNCTIONAL_ASSESSMENT: PREVENTS OR INTERFERES SOME ACTIVE ACTIVITIES AND ADLS

## 2023-04-02 ASSESSMENT — PAIN SCALES - GENERAL
PAINLEVEL_OUTOF10: 0
PAINLEVEL_OUTOF10: 0
PAINLEVEL_OUTOF10: 3

## 2023-04-02 ASSESSMENT — PAIN DESCRIPTION - PAIN TYPE: TYPE: ACUTE PAIN;CHRONIC PAIN

## 2023-04-02 ASSESSMENT — PAIN DESCRIPTION - ORIENTATION: ORIENTATION: RIGHT

## 2023-04-03 LAB
ANION GAP SERPL CALC-SCNC: 2 MMOL/L (ref 2–11)
BASOPHILS # BLD: 0 K/UL (ref 0–0.2)
BASOPHILS NFR BLD: 0 % (ref 0–2)
BUN SERPL-MCNC: 19 MG/DL (ref 8–23)
CALCIUM SERPL-MCNC: 8.9 MG/DL (ref 8.3–10.4)
CHLORIDE SERPL-SCNC: 111 MMOL/L (ref 101–110)
CO2 SERPL-SCNC: 28 MMOL/L (ref 21–32)
CREAT SERPL-MCNC: 0.9 MG/DL (ref 0.6–1)
DIFFERENTIAL METHOD BLD: ABNORMAL
EOSINOPHIL # BLD: 0.2 K/UL (ref 0–0.8)
EOSINOPHIL NFR BLD: 2 % (ref 0.5–7.8)
ERYTHROCYTE [DISTWIDTH] IN BLOOD BY AUTOMATED COUNT: 16.7 % (ref 11.9–14.6)
GLUCOSE BLD STRIP.AUTO-MCNC: 136 MG/DL (ref 65–100)
GLUCOSE BLD STRIP.AUTO-MCNC: 141 MG/DL (ref 65–100)
GLUCOSE BLD STRIP.AUTO-MCNC: 156 MG/DL (ref 65–100)
GLUCOSE BLD STRIP.AUTO-MCNC: 157 MG/DL (ref 65–100)
GLUCOSE SERPL-MCNC: 127 MG/DL (ref 65–100)
HCT VFR BLD AUTO: 28.5 % (ref 35.8–46.3)
HGB BLD-MCNC: 8.9 G/DL (ref 11.7–15.4)
IMM GRANULOCYTES # BLD AUTO: 0 K/UL (ref 0–0.5)
IMM GRANULOCYTES NFR BLD AUTO: 0 % (ref 0–5)
LYMPHOCYTES # BLD: 1.2 K/UL (ref 0.5–4.6)
LYMPHOCYTES NFR BLD: 18 % (ref 13–44)
MCH RBC QN AUTO: 30.4 PG (ref 26.1–32.9)
MCHC RBC AUTO-ENTMCNC: 31.2 G/DL (ref 31.4–35)
MCV RBC AUTO: 97.3 FL (ref 82–102)
MONOCYTES # BLD: 0.6 K/UL (ref 0.1–1.3)
MONOCYTES NFR BLD: 9 % (ref 4–12)
NEUTS SEG # BLD: 4.7 K/UL (ref 1.7–8.2)
NEUTS SEG NFR BLD: 71 % (ref 43–78)
NRBC # BLD: 0 K/UL (ref 0–0.2)
PLATELET # BLD AUTO: 206 K/UL (ref 150–450)
PMV BLD AUTO: 10.2 FL (ref 9.4–12.3)
POTASSIUM SERPL-SCNC: 3.8 MMOL/L (ref 3.5–5.1)
RBC # BLD AUTO: 2.93 M/UL (ref 4.05–5.2)
SERVICE CMNT-IMP: ABNORMAL
SODIUM SERPL-SCNC: 141 MMOL/L (ref 133–143)
WBC # BLD AUTO: 6.7 K/UL (ref 4.3–11.1)

## 2023-04-03 PROCEDURE — G0378 HOSPITAL OBSERVATION PER HR: HCPCS

## 2023-04-03 PROCEDURE — 97530 THERAPEUTIC ACTIVITIES: CPT

## 2023-04-03 PROCEDURE — 96372 THER/PROPH/DIAG INJ SC/IM: CPT

## 2023-04-03 PROCEDURE — 85025 COMPLETE CBC W/AUTO DIFF WBC: CPT

## 2023-04-03 PROCEDURE — 82962 GLUCOSE BLOOD TEST: CPT

## 2023-04-03 PROCEDURE — 6360000002 HC RX W HCPCS: Performed by: INTERNAL MEDICINE

## 2023-04-03 PROCEDURE — 6370000000 HC RX 637 (ALT 250 FOR IP): Performed by: INTERNAL MEDICINE

## 2023-04-03 PROCEDURE — 36415 COLL VENOUS BLD VENIPUNCTURE: CPT

## 2023-04-03 PROCEDURE — 80048 BASIC METABOLIC PNL TOTAL CA: CPT

## 2023-04-03 PROCEDURE — 2580000003 HC RX 258: Performed by: INTERNAL MEDICINE

## 2023-04-03 RX ADMIN — ASPIRIN 81 MG 81 MG: 81 TABLET ORAL at 08:15

## 2023-04-03 RX ADMIN — MEGESTROL ACETATE 40 MG: 40 TABLET ORAL at 08:15

## 2023-04-03 RX ADMIN — DIVALPROEX SODIUM 250 MG: 125 CAPSULE, COATED PELLETS ORAL at 22:38

## 2023-04-03 RX ADMIN — LISINOPRIL 10 MG: 5 TABLET ORAL at 08:15

## 2023-04-03 RX ADMIN — SODIUM CHLORIDE, PRESERVATIVE FREE 10 ML: 5 INJECTION INTRAVENOUS at 08:16

## 2023-04-03 RX ADMIN — DIVALPROEX SODIUM 250 MG: 125 CAPSULE, COATED PELLETS ORAL at 08:15

## 2023-04-03 RX ADMIN — ENOXAPARIN SODIUM 40 MG: 100 INJECTION SUBCUTANEOUS at 08:15

## 2023-04-03 RX ADMIN — PANTOPRAZOLE SODIUM 40 MG: 40 TABLET, DELAYED RELEASE ORAL at 06:20

## 2023-04-03 RX ADMIN — Medication 3 MG: at 22:38

## 2023-04-03 RX ADMIN — LEVOTHYROXINE SODIUM 100 MCG: 0.1 TABLET ORAL at 06:20

## 2023-04-03 RX ADMIN — ATORVASTATIN CALCIUM 80 MG: 80 TABLET, FILM COATED ORAL at 22:38

## 2023-04-03 RX ADMIN — CLOPIDOGREL BISULFATE 75 MG: 75 TABLET ORAL at 08:15

## 2023-04-03 RX ADMIN — SODIUM CHLORIDE, PRESERVATIVE FREE 10 ML: 5 INJECTION INTRAVENOUS at 22:43

## 2023-04-03 ASSESSMENT — PAIN SCALES - GENERAL: PAINLEVEL_OUTOF10: 0

## 2023-04-04 LAB
GLUCOSE BLD STRIP.AUTO-MCNC: 108 MG/DL (ref 65–100)
GLUCOSE BLD STRIP.AUTO-MCNC: 141 MG/DL (ref 65–100)
GLUCOSE BLD STRIP.AUTO-MCNC: 164 MG/DL (ref 65–100)
GLUCOSE BLD STRIP.AUTO-MCNC: 317 MG/DL (ref 65–100)
GLUCOSE BLD STRIP.AUTO-MCNC: 431 MG/DL (ref 65–100)
SERVICE CMNT-IMP: ABNORMAL

## 2023-04-04 PROCEDURE — G0378 HOSPITAL OBSERVATION PER HR: HCPCS

## 2023-04-04 PROCEDURE — 2580000003 HC RX 258: Performed by: INTERNAL MEDICINE

## 2023-04-04 PROCEDURE — 6370000000 HC RX 637 (ALT 250 FOR IP): Performed by: INTERNAL MEDICINE

## 2023-04-04 PROCEDURE — 97530 THERAPEUTIC ACTIVITIES: CPT

## 2023-04-04 PROCEDURE — 82962 GLUCOSE BLOOD TEST: CPT

## 2023-04-04 PROCEDURE — 6360000002 HC RX W HCPCS: Performed by: INTERNAL MEDICINE

## 2023-04-04 PROCEDURE — 96372 THER/PROPH/DIAG INJ SC/IM: CPT

## 2023-04-04 RX ORDER — TRAMADOL HYDROCHLORIDE 50 MG/1
25 TABLET ORAL EVERY 6 HOURS PRN
Qty: 9 TABLET | Refills: 0 | Status: SHIPPED | OUTPATIENT
Start: 2023-04-04 | End: 2023-04-09

## 2023-04-04 RX ORDER — DIVALPROEX SODIUM 125 MG/1
250 CAPSULE, COATED PELLETS ORAL EVERY 12 HOURS SCHEDULED
Qty: 120 CAPSULE | Refills: 0 | Status: SHIPPED | OUTPATIENT
Start: 2023-04-04 | End: 2023-05-04

## 2023-04-04 RX ORDER — LIDOCAINE 4 G/G
1 PATCH TOPICAL DAILY
Qty: 7 EACH | Refills: 0
Start: 2023-04-05 | End: 2023-04-12

## 2023-04-04 RX ADMIN — LEVOTHYROXINE SODIUM 100 MCG: 0.1 TABLET ORAL at 05:38

## 2023-04-04 RX ADMIN — INSULIN LISPRO 6 UNITS: 100 INJECTION, SOLUTION INTRAVENOUS; SUBCUTANEOUS at 11:42

## 2023-04-04 RX ADMIN — SODIUM CHLORIDE, PRESERVATIVE FREE 10 ML: 5 INJECTION INTRAVENOUS at 08:41

## 2023-04-04 RX ADMIN — DIVALPROEX SODIUM 250 MG: 125 CAPSULE, COATED PELLETS ORAL at 08:40

## 2023-04-04 RX ADMIN — ATORVASTATIN CALCIUM 80 MG: 80 TABLET, FILM COATED ORAL at 20:41

## 2023-04-04 RX ADMIN — SODIUM CHLORIDE, PRESERVATIVE FREE 5 ML: 5 INJECTION INTRAVENOUS at 20:41

## 2023-04-04 RX ADMIN — ASPIRIN 81 MG 81 MG: 81 TABLET ORAL at 08:40

## 2023-04-04 RX ADMIN — LISINOPRIL 10 MG: 5 TABLET ORAL at 08:40

## 2023-04-04 RX ADMIN — MEGESTROL ACETATE 40 MG: 40 TABLET ORAL at 08:40

## 2023-04-04 RX ADMIN — CLOPIDOGREL BISULFATE 75 MG: 75 TABLET ORAL at 08:40

## 2023-04-04 RX ADMIN — Medication 3 MG: at 20:41

## 2023-04-04 RX ADMIN — ENOXAPARIN SODIUM 40 MG: 100 INJECTION SUBCUTANEOUS at 08:40

## 2023-04-04 RX ADMIN — DIVALPROEX SODIUM 250 MG: 125 CAPSULE, COATED PELLETS ORAL at 20:41

## 2023-04-04 ASSESSMENT — PAIN SCALES - GENERAL: PAINLEVEL_OUTOF10: 0

## 2023-04-05 VITALS
OXYGEN SATURATION: 99 % | SYSTOLIC BLOOD PRESSURE: 127 MMHG | HEART RATE: 63 BPM | RESPIRATION RATE: 18 BRPM | TEMPERATURE: 98.1 F | WEIGHT: 135 LBS | HEIGHT: 65 IN | DIASTOLIC BLOOD PRESSURE: 69 MMHG | BODY MASS INDEX: 22.49 KG/M2

## 2023-04-05 PROBLEM — I21.3 STEMI (ST ELEVATION MYOCARDIAL INFARCTION) (HCC): Status: RESOLVED | Noted: 2022-10-03 | Resolved: 2023-04-05

## 2023-04-05 LAB
GLUCOSE BLD STRIP.AUTO-MCNC: 112 MG/DL (ref 65–100)
GLUCOSE BLD STRIP.AUTO-MCNC: 218 MG/DL (ref 65–100)
SARS-COV-2 RDRP RESP QL NAA+PROBE: NOT DETECTED
SERVICE CMNT-IMP: ABNORMAL
SERVICE CMNT-IMP: ABNORMAL
SOURCE: NORMAL

## 2023-04-05 PROCEDURE — 6370000000 HC RX 637 (ALT 250 FOR IP): Performed by: INTERNAL MEDICINE

## 2023-04-05 PROCEDURE — 6360000002 HC RX W HCPCS: Performed by: INTERNAL MEDICINE

## 2023-04-05 PROCEDURE — 97530 THERAPEUTIC ACTIVITIES: CPT

## 2023-04-05 PROCEDURE — 96372 THER/PROPH/DIAG INJ SC/IM: CPT

## 2023-04-05 PROCEDURE — 87635 SARS-COV-2 COVID-19 AMP PRB: CPT

## 2023-04-05 PROCEDURE — G0378 HOSPITAL OBSERVATION PER HR: HCPCS

## 2023-04-05 PROCEDURE — 97112 NEUROMUSCULAR REEDUCATION: CPT

## 2023-04-05 PROCEDURE — 97535 SELF CARE MNGMENT TRAINING: CPT

## 2023-04-05 PROCEDURE — 2580000003 HC RX 258: Performed by: INTERNAL MEDICINE

## 2023-04-05 PROCEDURE — 82962 GLUCOSE BLOOD TEST: CPT

## 2023-04-05 RX ADMIN — PANTOPRAZOLE SODIUM 40 MG: 40 TABLET, DELAYED RELEASE ORAL at 04:31

## 2023-04-05 RX ADMIN — LISINOPRIL 10 MG: 5 TABLET ORAL at 09:04

## 2023-04-05 RX ADMIN — ASPIRIN 81 MG 81 MG: 81 TABLET ORAL at 09:04

## 2023-04-05 RX ADMIN — DIVALPROEX SODIUM 250 MG: 125 CAPSULE, COATED PELLETS ORAL at 09:04

## 2023-04-05 RX ADMIN — ENOXAPARIN SODIUM 40 MG: 100 INJECTION SUBCUTANEOUS at 09:05

## 2023-04-05 RX ADMIN — INSULIN LISPRO 2 UNITS: 100 INJECTION, SOLUTION INTRAVENOUS; SUBCUTANEOUS at 14:06

## 2023-04-05 RX ADMIN — LEVOTHYROXINE SODIUM 100 MCG: 0.1 TABLET ORAL at 04:31

## 2023-04-05 RX ADMIN — SODIUM CHLORIDE, PRESERVATIVE FREE 10 ML: 5 INJECTION INTRAVENOUS at 09:04

## 2023-04-05 RX ADMIN — CLOPIDOGREL BISULFATE 75 MG: 75 TABLET ORAL at 09:04

## 2023-04-05 RX ADMIN — MEGESTROL ACETATE 40 MG: 40 TABLET ORAL at 09:04

## 2023-04-05 NOTE — DISCHARGE SUMMARY
04/05/23 0321 98.8 °F (37.1 °C) 61 17 (!) 157/68 98 %   04/04/23 2336 98.4 °F (36.9 °C) 59 17 (!) 140/64 95 %   04/04/23 1910 99 °F (37.2 °C) 63 18 (!) 159/76 98 %   04/04/23 1634 98.7 °F (37.1 °C) 60 16 (!) 148/69 95 %   04/04/23 1113 98.4 °F (36.9 °C) 63 16 122/70 98 %       Oxygen Therapy  SpO2: 92 %  Pulse Oximetry Type: Continuous  Pulse Oximeter Device Mode: Intermittent  Pulse Oximeter Device Location: Finger  O2 Device: None (Room air)    Estimated body mass index is 22.47 kg/m² as calculated from the following:    Height as of this encounter: 5' 5\" (1.651 m). Weight as of this encounter: 135 lb (61.2 kg). Intake/Output Summary (Last 24 hours) at 4/5/2023 1012  Last data filed at 4/5/2023 0630  Gross per 24 hour   Intake --   Output 825 ml   Net -825 ml         Physical Exam:  General:    Well nourished. No overt distress. Awake, oriented, pleasant and conversant. Weak appearing. Head:  Normocephalic, atraumatic  Eyes:  Sclerae appear normal.  Pupils equally round. HENT:  Nares appear normal, no drainage. Moist mucous membranes  Neck:  No restricted ROM. Trachea midline  CV:   RRR. No m/r/g. No JVD  Lungs:   CTAB. No wheezing, rhonchi, or rales. Respirations even, unlabored  Abdomen:   Soft, nontender, nondistended. Extremities: Warm and dry. No cyanosis or clubbing. No edema. Sling to RUE. Skin:     No rashes. Normal coloration  Neuro:  CN II-XII grossly intact. A/O x3. Psych:  Normal mood and affect. Signed:  Opal Orantes MD    Part of this note may have been written by using a voice dictation software. The note has been proof read but may still contain some grammatical/other typographical errors.

## 2023-04-05 NOTE — CARE COORDINATION
[facility] declined. CM awaiting decision from [facility]. CM following.
CM met with pt/son/DIL to discuss dc needs. They are in agreement with referral to SNF for STR to possible LTC. They requested a referral to [facility]. Referral submitted. CM provided them with a list of SNF's in network with her insurance to review for other choices should [facility] be unable to accept her. CM encouraged them to start the SNF medicaid application process asap. They verbalized understanding. PPD placed 3/24/2023. CM following to assist as needed. 03/27/23 2965   Service Assessment   Patient Orientation Alert and Oriented;Person;Self   Cognition Dementia / Early Alzheimer's   History Provided By Child/Family;Medical Record   Primary Caregiver Family   Accompanied By/Relationship son/Poli, LES/[employer]az[employer]   Support Systems Children;Family Members   Patient's Healthcare Decision Maker is: Legal Next of Myriam Frederick   PCP Verified by CM Yes   Prior Functional Level Assistance with the following:;Mobility;Cooking;Housework; Shopping   Current Functional Level Assistance with the following:;Bathing;Dressing; Toileting;Cooking;Housework; Shopping;Mobility   Can patient return to prior living arrangement No   Ability to make needs known: Fair   Family able to assist with home care needs: No  (pt's needs are currently more than her son can handle)   Would you like for me to discuss the discharge plan with any other family members/significant others, and if so, who?  Yes  (Poli/son; DIL/ [email or web address])   Financial Resources [employer] None   Social/Functional History   Lives With Son   Type of Home Apartment   Home Layout One level   Home Equipment Cane;Walker, rolling   Receives Help From Family   ADL Assistance Needs assistance   Toileting Needs assistance   Homemaking Assistance Needs assistance   Ambulation Assistance Needs assistance   Transfer Assistance Needs assistance   Active  No   Patient's  Info family   Occupation Retired   Discharge Planning   Type of Residence
CM spoke with pt's Eris Stone, via phone. CM provided update re: referrals. She requested a referral to [facility] as well as other facilities that would be fairly close to where the pt lives and where there will be access to public transportation for the son. Referral submitted to [facility]. Awaiting responses from all referrals. CM following.
[employer] is requiring a Peer to Peer prior to making a final decision. Information provided to hospitalist.  MD to call [phone], option 3, before Wednesday, 4/5/23 at 1200 EST. Reference #[id number].
[facility] does not have a bed available. Referral submitted to [facility]. CM left VM message with pt's LES, Jolanta, requesting a return call to update her. CM following.
Pt continues to sleep and does acknowledge CM. Therapy services has documented that patient would benefit from skilled therapy in a short term rehab.
Pt has been accepted for admission to [facility] pending insurance approval.  Elizabet Roles request submitted online and clinical information faxed to them for review. Reference #[id number]. CM notified pt's Suan Ours. CM following to facilitate pt's transfer to rehab if insurance approves and when pt is medically cleared for dc.
Son Hilary Clay is updated re: plan to admit to hospital for observation and of PT /OT recommendation for rehab. He reports she was previously In a rehab near [facility] but he did not recall the name. He states it is in walking distance to his brother. He offered another alternative as placement near the [facility] area where he lives.
Spoke with son Hilary Clay [phone] regarding his mother in the ED. He had lengthy conversation yesterday with ED CM Mariela Menendez re: next steps for his mom that included home health services to long term inpatient care and what he needed to do for each pathway. This morning Hilary Clay states he is doesn't know what to do next and he lives in [place]. Patient resides with his brother Jing Garcia who is on disability for schizophrenia and tries to help but is unable to manage patient at home. He is unable to bathe her and gives her minimal food/feeding assistance and she is unable to do this for herself with recent fall and fracture. She is also refusing to wear sling provided. Son is stating there is a recent diagnosis of dementia? He adds he is unsure if recent confused and sometimes \"hysterical behavior\" is related to deteriorating mental status or reaction to oxycodone prescribed for fracture. Hilary Clay is stating he is able to come to town now and then and handles shopping and paying their bills. He reports in the past arranging for 30 hours a week caregiver from [facility] that patient refused to let help her. He states they have no additional financial resources and patient lives in [address] housing. Will continue to follow and plan accordingly based on clinical evaluation/findings. PT/OT order to evaluate for needs is pending.
Prior To Admission None   Potential Assistance Needed Chuckie AMBROCIO Ordered? No   Potential Assistance Purchasing Medications No   Type of Home Care Services None   Patient expects to be discharged to: Rehabilitation facility   One/Two Story Residence One story   History of falls? 1   Services At/After Discharge   Transition of Care Consult (CM Consult) SNF; Discharge Planning   Partner SNF No   Reason Why Partner SNF Not Chosen Bed availability; Not covered by payor   Services At/After Discharge PT;OT;In ambulance;Skilled Nursing Facility (SNF); Nursing services;Transport   [employer] Information Provided? No   Mode of Transport at Discharge [address] Time of Discharge 1400   Confirm Follow Up Transport Family   Condition of Participation: Discharge Planning   The Plan for Transition of Care is related to the following treatment goals: STR to improve pt's strength and functional abilities for a safe transition to her next level of care   The Patient and/or Patient Representative was provided with a Choice of Provider? Patient Representative   Name of the Patient Representative who was provided with the Choice of Provider and agrees with the Discharge Plan? Valente Devine   The Patient and/Or Patient Representative agree with the Discharge Plan? Yes   Freedom of Choice list was provided with basic dialogue that supports the patient's individualized plan of care/goals, treatment preferences, and shares the quality data associated with the providers?   Yes

## 2023-04-05 NOTE — PROGRESS NOTES
's visit to convey care and concern and to explore spiritual needs. Ms. Nanda Rivas was sitting in the chair during my visit. Patient talked about her son during most of the visit. Offered prayer for patient, family, and staff.      Eduar Liu 69  Board Certified 
ACUTE OCCUPATIONAL THERAPY GOALS:   (Developed with and agreed upon by patient and/or caregiver.)  1. Pt will complete grooming with min A  2. Pt will complete UB bathing and dressing with min A  3. Pt will complete bed mobility with min A in prep for aDLs  4. Pt will maintain sitting balance for ADLs with CGA  5. Pt will stand for ADLs with min A  6. Pt will demonstrate improved cognition to complete functional tasks with min or fewer cues     Timeframe: 7 days       OCCUPATIONAL THERAPY: Daily Note AM   OT Visit Days: 2   Time In/Out  OT Charge Capture  Rehab Caseload Tracker  OT Orders    Jb Banda is a 81 y.o. female   PRIMARY DIAGNOSIS: Generalized weakness  Delirium [R41.0]  Visual hallucinations [R44.1]  Generalized weakness [R53.1]  Severe dementia with agitation, unspecified dementia type ([facility] [F03. C11]       Observation: Payor: HU[employer]NA MEDICARE / Plan: HU[employer]NA GOLD PLUS HMO / Product Type: *No Product type* /     ASSESSMENT:     REHAB RECOMMENDATIONS: CURRENT LEVEL OF FUNCTION:  (Most Recently Demonstrated)   Recommendation to date pending progress:  Setting:  Short-term Rehab    Equipment:    To Be Determined Bathing:  Not Tested  Dressing:  Not Tested  Feeding/Grooming:  Not Tested  Toileting:  Not Tested  Functional Mobility:  Minimal Assist/Moderate A     ASSESSMENT:  Ms. Aleida Woodall is doing fair today. Pt presents supine upon arrival. Pt confused but cooperative. Pt required min A to perform bed mobility and sit to stand transfer. Pt demonstrates fair standing balance during transfer over to chair. More confident on her feet today. Pt left in chair with all needs in reach and alarm intact. Will continue to benefit from skilled OT during stay.        SUBJECTIVE:     Ms. Aleida Woodall states, \"I told you I feel off the bed\"     Social/Functional Lives With: Son  Type of Home: Apartment  Home Layout: One level  Home Equipment: [employer], [address] Help From: Family  ADL Assistance: Needs
ACUTE OCCUPATIONAL THERAPY GOALS:   (Developed with and agreed upon by patient and/or caregiver.)  1. Pt will complete grooming with min A  2. Pt will complete UB bathing and dressing with min A  3. Pt will complete bed mobility with min A in prep for aDLs  4. Pt will maintain sitting balance for ADLs with CGA  5. Pt will stand for ADLs with min A  6. Pt will demonstrate improved cognition to complete functional tasks with min or fewer cues    Timeframe: 7 days      OCCUPATIONAL THERAPY Initial Assessment          Acknowledge Orders  Time  OT Charge Capture  Rehab Caseload Tracker      Quiana Sanford is a 81 y.o. female   PRIMARY DIAGNOSIS: <principal problem not specified>  No admission diagnoses are documented for this encounter. Reason for Referral: Generalized Muscle Weakness (M62.81)  Emergency: Payor: Trav Valadez / Plan: HUMANA GOLD PLUS HMO / Product Type: *No Product type* /     ASSESSMENT:     REHAB RECOMMENDATIONS:   Recommendation to date pending progress:  Setting:  Short-term Rehab    Equipment:    None     ASSESSMENT:  Ms. La Weinstein was admitted with recent R humerus fracture, inability to care for herself at home. Per chart, new diagnosis of dementia. Pt presented with deficits in cognition, mobility, balance, and UE deficits impacting ADLs. Pt required max X2 for bed mobility, max-total A for sitting balance at edge of bed. Total A for all ADLs. Pt lethargic, minimally verbal, and unable to follow commands. Pt presented below her functional baseline and would benefit from skilled OT services to address deficits.       [employer] AM-PAC 6 Clicks Daily Activity Inpatient Short Form:    AM-PAC Daily Activity - Inpatient   How much help is needed for putting on and taking off regular lower body clothing?: Total  How much help is needed for bathing (which includes washing, rinsing, drying)?: Total  How much help is needed for toileting (which includes using toilet, bedpan, or urinal)?:
ACUTE PHYSICAL THERAPY GOALS:   (Developed with and agreed upon by patient and/or caregiver.)  (1.) Jeb Prince will move from supine to sit and sit to supine , scoot up and down, and roll side to side with MINIMAL ASSIST within 7 treatment day(s). (2.) Jeb Prince will transfer from bed to chair and chair to bed with MINIMAL ASSIST using the least restrictive device within 7 treatment day(s). (3.) Jeb Prince will ambulate with MINIMAL ASSIST for 25 feet with the least restrictive device within 7 treatment day(s). (4.) Jeb Prince will perform standing static and dynamic balance activities x 10 minutes with MINIMAL ASSIST to improve safety within 7 treatment day(s). (5.) Jeb Prince will perform therapeutic exercises x 20 min for HEP with STAND BY ASSIST to improve strength, endurance, and functional mobility within 7 treatment day(s). PHYSICAL THERAPY: Daily Note AM   (Link to Caseload Tracking: PT Visit Days : 2  Time In/Out PT Charge Capture  Rehab Caseload Tracker  Orders    Jeb Prince is a 81 y.o. female   PRIMARY DIAGNOSIS: Generalized weakness  Delirium [R41.0]  Visual hallucinations [R44.1]  Generalized weakness [R53.1]  Severe dementia with agitation, unspecified dementia type ([facility] [address] [F03. C11]       Observation: Payor: HUMANA MEDICARE / Plan: HUMANA GOLD PLUS HMO / Product Type: *No Product type* /     ASSESSMENT:     REHAB RECOMMENDATIONS:   Recommendation to date pending progress:  Setting:  Short-term Rehab    Equipment:    To Be Determined     ASSESSMENT:  Ms. Orville Mehta was supine upon contact and agreeable to PT. Alert, pleasantly confused and talking more today. Patient is NWB on RUE due to humerus fx. Educated patient on sling to alleviate pain and she agreed to wear. Patient performed supine to sit and transfer to standing with mod assist, additional time and cues for technique.  Once standing patient ambulated 6' to recliner chair with mod assist, HHA and cues for
ACUTE PHYSICAL THERAPY GOALS:   (Developed with and agreed upon by patient and/or caregiver.)  (1.) Jeb Prince will move from supine to sit and sit to supine , scoot up and down, and roll side to side with MINIMAL ASSIST within 7 treatment day(s). (2.) Jeb Prince will transfer from bed to chair and chair to bed with MINIMAL ASSIST using the least restrictive device within 7 treatment day(s). (3.) Jeb Prince will ambulate with MINIMAL ASSIST for 25 feet with the least restrictive device within 7 treatment day(s). (4.) Jeb Prince will perform standing static and dynamic balance activities x 10 minutes with MINIMAL ASSIST to improve safety within 7 treatment day(s). (5.) Jeb Prince will perform therapeutic exercises x 20 min for HEP with STAND BY ASSIST to improve strength, endurance, and functional mobility within 7 treatment day(s). PHYSICAL THERAPY: Daily Note AM   (Link to Caseload Tracking: PT Visit Days : 7  Time In/Out PT Charge Capture  Rehab Caseload Tracker  Orders    Jeb Prince is a 81 y.o. female   PRIMARY DIAGNOSIS: Generalized weakness  Delirium [R41.0]  Visual hallucinations [R44.1]  Generalized weakness [R53.1]  Severe dementia with agitation, unspecified dementia type ([facility] [address] [F03. C11]       Observation: Payor: HUMANA MEDICARE / Plan: HUMANA GOLD PLUS HMO / Product Type: *No Product type* /     ASSESSMENT:     REHAB RECOMMENDATIONS:   Recommendation to date pending progress:  Setting:  Short-term Rehab    Equipment:    To Be Determined     ASSESSMENT:  Ms. Orville Mehta with sling in place needs a lot of encouragement and she is strong enough to be walking but self limiting because she is so afraid of falling. Today mod of 2 for supine to sit. Sit to stand and a few steps mod of 2 with her really leaning posterior. She is just so scared. Minimal progress towards goals.       SUBJECTIVE:   Ms. Orville Mehta states, \"I just cant Im going to fall\"     Social/Functional Lives
ACUTE PHYSICAL THERAPY GOALS:   (Developed with and agreed upon by patient and/or caregiver.)  (1.) Nicole Carballo will move from supine to sit and sit to supine , scoot up and down, and roll side to side with MINIMAL ASSIST within 7 treatment day(s). (2.) Nicole Carballo will transfer from bed to chair and chair to bed with MINIMAL ASSIST using the least restrictive device within 7 treatment day(s). (3.) Nicole Carballo will ambulate with MINIMAL ASSIST for 25 feet with the least restrictive device within 7 treatment day(s). (4.) Nicole Carballo will perform standing static and dynamic balance activities x 10 minutes with MINIMAL ASSIST to improve safety within 7 treatment day(s). (5.) Nicole Carballo will perform therapeutic exercises x 20 min for HEP with STAND BY ASSIST to improve strength, endurance, and functional mobility within 7 treatment day(s). PHYSICAL THERAPY: Daily Note AM   (Link to Caseload Tracking: PT Visit Days : 4  Time In/Out PT Charge Capture  Rehab Caseload Tracker  Orders    Nicole Carballo is a 81 y.o. female   PRIMARY DIAGNOSIS: Generalized weakness  Delirium [R41.0]  Visual hallucinations [R44.1]  Generalized weakness [R53.1]  Severe dementia with agitation, unspecified dementia type ([facility] [F03. C11]       Observation: Payor: HUMANA MEDICARE / Plan: HUMANA GOLD PLUS HMO / Product Type: *No Product type* /     ASSESSMENT:     REHAB RECOMMENDATIONS:   Recommendation to date pending progress:  Setting:  Short-term Rehab    Equipment:    To Be Determined     ASSESSMENT:  Ms. Anabell Quezada was supine upon contact and agreeable to PT. Alert, pleasantly confused, talking and singing throughout treatment. Patient is NWB on RUE due to humerus fx with sling in place. Patient performed supine to sit with mod assist and cues for technique. Patient performed sit to stand with min-mod assist x 2 and cues for technique. Patient is fearful of falling demonstrating poor balance and difficulty ambulating.
Attempted to call report to [address] [place] [address]
Hospitalist Progress Note   Admit Date:  3/23/2023  7:22 PM   Name:  Fermin Welch   Age:  81 y.o. Sex:  female  :  1935   MRN:  [id number]   Room:  /    Presenting/Chief Complaint: Other     Reason(s) for Admission: Delirium [R41.0]  Visual hallucinations [R44.1]  Generalized weakness [R53.1]  Severe dementia with agitation, unspecified dementia type ([address] [F03. [address] Course:   Fermin Welch is a 81 y.o. 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 She was here on 3/15 after a fall at home. X-ray showed a R humerus fracture, she was ultimately discharged home. Came back on 3/18 with R arm pain, again discharged. Lives with one of her sons who has health issues, her other son lives in the [facility]. She was brought back to the ER again on 3/23 for evaluation of placement due to tenuous living conditions and a gross inability to care for herself. Subjective & 24hr Events (23): Patient is doing well this morning. No fever no chills. No chest pain or shortness of breath. No nausea no vomiting. Currently awaiting placement. Assessment & Plan: This is a 25-year-old female with    Generalized weakness with inability to care for self at home  -Pending placement. Appreciate case management's assistance.  -Continue Pt/ OT    HTN // CAD (STEMI in )- Cont lisinopril, asa, lipitor, coreg, plavix    Type 2 diabetes mellitus   - SSI     Hypothyroidism  - Cont levothyroxine  - Repeat TSH level in 4-6 weeks     Dementia  Plan: appears to be recent diagnosis. Cannot care for self at home. Reportedly she lives with a son who also struggles with MH issues and cannot care for her at this time  - pending placement. Appreciate CM's assistance. - now on depakote sprinkles.  Increase to 250mg BID  - on
Hospitalist Progress Note   Admit Date:  3/23/2023  7:22 PM   Name:  Joseph Adhikari   Age:  81 y.o. Sex:  female  :  1935   MRN:  [id number]   Room:      Presenting/Chief Complaint: Other     Reason(s) for Admission: Delirium [R41.0]  Visual hallucinations [R44.1]  Generalized weakness [R53.1]  Severe dementia with agitation, unspecified dementia type ([address] [F03. [address] Course:   Joseph Adhikari is a 81 y.o. 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 She was here on 3/15 after a fall at home. X-ray showed a R humerus fracture, she was ultimately discharged home. Came back on 3/18 with R arm pain, again discharged. Lives with one of her sons who has health issues, her other son lives in the [facility]. She was brought back to the ER again on 3/23 for evaluation of placement due to tenuous living conditions and a gross inability to care for herself. Subjective & 24hr Events (23): Seen and examined at bedside. No acute events noted overnight. Patient is pleasantly confused, laying in bed without any acute distress. Complains of left upper arm and shoulder pain with movement. Reports that she is barely able to move due to pain. Does not have any other acute complaints. Assessment & Plan:   Generalized weakness with inability to care for self at home  -Pending placement. Appreciate case management's assistance.  -Continue Pt/ OT    HTN // CAD (STEMI in )- Cont lisinopril, asa, lipitor, coreg, plavix  Type 2 diabetes mellitus - SSI     Hypothyroidism  - Cont levothyroxine  - Repeat TSH level in 4-6 weeks     Dementia  Plan: appears to be recent diagnosis. Cannot care for self at home.  Reportedly she lives with a son who also struggles with MH issues and cannot care for her at this time  -
Patient is resting peacefully. No family present  No distress noted.
Pt has not voided since yesterday, bladder scanned pt and it showed greater than 450ml  . Order to straight cath pt. Straight cath pt, 510ml of clear misty urine into bag. PA made aware.
Spoke with son from [facility] Bela Reyna [phone]      Son verbalized concern with pt discharge plan. Son would like for the   to follow up with on the pt discharge plan. Son has asked that pt stay in the hospital long as possible being that they can't tend to her at home. Rodney Sutton would like to receive phone calls pertaining to pt. Rodney Sutton states that Emile may not always answer the phone.
TRANSFER - IN REPORT:    Verbal report received from [address], [address] [facility] on [place]  being received from ED for routine progression of patient care      Report consisted of patient's Situation, Background, Assessment and   Recommendations(SBAR). Information from the following report(s) Nurse Handoff Report was reviewed with the receiving nurse. Opportunity for questions and clarification was provided. Assessment to be completed upon patient's arrival to unit and care to be assumed.
assistance  Homemaking Assistance: Needs assistance  Ambulation Assistance: Needs assistance  Transfer Assistance: Needs assistance  Active : No  Patient's  Info: family  Occupation: Retired    OBJECTIVE:     Tellis Merlin / Paris Ramos / Maria Isabel Law: NA    RESTRICTIONS/PRECAUTIONS:  Restrictions/Precautions  Restrictions/Precautions: Fall Risk, Bed Alarm        PAIN: VITALS / O2:   Pre Treatment:            Post Treatment: 0 Vitals          Oxygen        MOBILITY: I Mod I S SBA CGA Min Mod Max Total  NT x2 Comments:   Bed Mobility    Rolling [] [] [] [] [] [] [] [] [] [x] []    Supine to Sit [] [] [] [] [] [] [] [] [] [x] []    Scooting [] [] [] [] [] [] [] [] [] [x] []    Sit to Supine [] [] [] [] [] [] [x] [] [] [] []    Transfers    Sit to Stand [] [] [] [] [] [] [x] [] [] [] []    Bed to Chair [] [] [] [] [] [] [] [] [] [x] []    Stand to Sit [] [] [] [] [] [] [] [] [] [x] []    Tub/Shower [] [] [] [] [] [] [] [] [] [x] []     Toilet [] [] [] [] [] [] [] [] [] [x] []      [] [] [] [] [] [] [] [] [] [] []    I=Independent, Mod I=Modified Independent, S=Supervision/Setup, SBA=Standby Assistance, CGA=Contact Guard Assistance, Min=Minimal Assistance, Mod=Moderate Assistance, Max=Maximal Assistance, Total=Total Assistance, NT=Not Tested    ACTIVITIES OF DAILY LIVING: I Mod I S SBA CGA Min Mod Max Total NT Comments   BASIC ADLs:              Upper Body   Bathing [] [] [] [] [] [] [] [] [] [x]    Lower Body Bathing [] [] [] [] [] [] [] [] [] [x]    Toileting [] [] [] [] [] [] [] [] [] [x]    Upper Body Dressing [] [] [] [] [] [] [] [] [] [x]    Lower Body Dressing [] [] [] [] [] [] [] [] [] [x]    Feeding [] [] [] [] [] [] [] [] [] [x]    Grooming [] [] [] [] [] [] [] [] [] [x]    Personal Device Care [] [] [] [] [] [] [] [] [] [x]    Functional Mobility [] [] [] [] [] [] [x] [] [] []    I=Independent, Mod I=Modified Independent, S=Supervision/Setup, SBA=Standby Assistance, CGA=Contact Guard Assistance, Min=Minimal
bed)  Ideal Body Weight (Kg) (Calculated): 57 kg (125 lbs), 109.3 %  Usual Body Wt:  , Percent weight change:         BMI Category Normal Weight (BMI 22.0 to 24.9) age over 72  Estimated Daily Nutrient Needs:  Energy (kcal/day): 7058-2179 (25-30 kcal/kg) (Kcal/kg Weight used: 62 kg Current  Protein (g/day): 62-78 g (1-1.25 g/kg) Weight Used: (Current) 62 kg  Fluid (ml/day):   (1 ml/kcal)    Nutrition Diagnosis:   Predicted inadequate energy intake related to cognitive or neurological impairment as evidenced by intake 0-25% (limited movement due to one arm broken)  Nutrition Interventions:   Food and/or Nutrient Delivery: Continue Current Diet, Start Oral Nutrition Supplement     Coordination of Nutrition Care: Continue to monitor while inpatient  Plan of Care discussed with: Mark DE ANDA    Goals: Active Goal: Meet at least 75% of estimated needs, by next RD assessment       Nutrition Monitoring and Evaluation:      Food/Nutrient Intake Outcomes: Food and Nutrient Intake, Supplement Intake  Physical Signs/Symptoms Outcomes: Meal Time Behavior, Weight    Discharge Planning:     Too soon to determine    Electronically signed by Ramses Schulte MS, RD, LD on 3/31/2023 at 12:49 PM.
control. Started on lidocaine patch  - NWB for now. Sling in place  - PT/OT     Reported sacral wound   - Wound care consulted  - Turn and reposition q2 hours       PT/OT evals and PPD needed/ordered? Yes    DISPO: Medically ready for discharge. Awaiting placement to rehab facility. Case management on board. Diet:  ADULT DIET; Regular; 4 carb choices (60 gm/meal); Low Fat/Low Chol/High Fiber/RAMA  ADULT ORAL NUTRITION SUPPLEMENT; Dinner, Lunch, Breakfast; Diabetic Oral Supplement    VTE prophylaxis: Lovenox    Code status: Full Code    Hospital Problems:  Principal Problem:    Generalized weakness  Active Problems:    STEMI (ST elevation myocardial infarction) ([facility] [address]    HTN (hypertension)    Type 2 diabetes mellitus ([facility] [address]    Hypothyroidism    Dementia ([facility]  Resolved Problems:    * No resolved hospital problems. *      Objective:   Patient Vitals for the past 24 hrs:   Temp Pulse Resp BP SpO2   04/01/23 1300 -- -- 16 -- --   04/01/23 1230 -- -- 17 -- --   04/01/23 1139 99.1 °F (37.3 °C) 60 18 135/68 99 %   04/01/23 0737 98.6 °F (37 °C) 60 14 137/68 98 %   04/01/23 0302 99 °F (37.2 °C) 59 16 123/67 95 %   03/31/23 2257 98.8 °F (37.1 °C) 60 18 136/71 98 %   03/31/23 2150 -- -- 15 -- --   03/31/23 2120 -- -- 18 -- --   03/31/23 1919 98.4 °F (36.9 °C) 64 16 (!) 166/71 98 %   03/31/23 1502 98.2 °F (36.8 °C) 59 18 131/67 100 %       Oxygen Therapy  SpO2: 99 %  Pulse Oximetry Type: Continuous  Pulse Oximeter Device Mode: Intermittent  Pulse Oximeter Device Location: Finger  O2 Device: None (Room air)    Estimated body mass index is 22.47 kg/m² as calculated from the following:    Height as of this encounter: 5' 5\" (1.651 m). Weight as of this encounter: 135 lb (61.2 kg).     Intake/Output Summary (Last 24 hours) at 4/1/2023 1432  Last data filed at 4/1/2023 0314  Gross per 24 hour   Intake 237 ml   Output 850 ml   Net -613 ml         Physical Exam:     Blood pressure 135/68, pulse 60, temperature 99.1 °F (37.3 °C),
issues and cannot care for her at this time  - pending placement    Proximal R humerus fracture -   - supportive pain control  - NWB for now  - PT/OT  - pending rehab placement    Reported sacral wound -   - Wound care consulted  - Turn and reposition q2 hours    PT/OT evals and PPD needed/ordered? Yes  Diet:  ADULT DIET; Regular; 4 carb choices (60 gm/meal); Low Fat/Low Chol/High Fiber/RAMA  VTE prophylaxis: Lovenox  Code status: Full Code    Hospital Problems:  Principal Problem:    Generalized weakness  Active Problems:    HTN (hypertension)    Type 2 diabetes mellitus ([facility] [address]    Hypothyroidism    Dementia (HCC)  Resolved Problems:    * No resolved hospital problems. *      Objective:   Patient Vitals for the past 24 hrs:   Temp Pulse Resp BP SpO2   03/25/23 1506 97.3 °F (36.3 °C) 60 16 (!) 165/79 100 %   03/25/23 1154 97.3 °F (36.3 °C) 62 18 (!) 146/80 98 %   03/25/23 0707 97.2 °F (36.2 °C) 61 16 139/84 96 %   03/25/23 0344 97.3 °F (36.3 °C) 59 16 125/69 96 %   03/25/23 0007 97.3 °F (36.3 °C) 61 16 126/70 99 %   03/24/23 1950 97.3 °F (36.3 °C) 60 16 (!) 144/71 97 %   03/24/23 1751 97.5 °F (36.4 °C) 60 20 (!) 150/84 98 %   03/24/23 1715 -- -- -- (!) 152/88 --   03/24/23 1655 -- -- -- (!) 149/87 98 %   03/24/23 1645 -- -- -- 139/81 96 %   03/24/23 1628 -- 60 -- 131/84 96 %   03/24/23 1618 -- -- -- 133/81 97 %   03/24/23 1558 -- -- -- 127/77 97 %   03/24/23 1548 -- -- -- 132/83 97 %       Oxygen Therapy  SpO2: 100 %  Pulse Oximetry Type: Continuous  Pulse Oximeter Device Mode: Continuous  O2 Device: None (Room air)    Estimated body mass index is 22.47 kg/m² as calculated from the following:    Height as of this encounter: 5' 5\" (1.651 m). Weight as of this encounter: 135 lb (61.2 kg).     Intake/Output Summary (Last 24 hours) at 3/25/2023 1540  Last data filed at 3/25/2023 1245  Gross per 24 hour   Intake 360 ml   Output --   Net 360 ml         Physical Exam:     Blood pressure (!) 165/79, pulse 60, temperature
melatonin for sleep     Proximal R humerus fracture   - supportive pain control. Started on lidocaine patch  - NWB for now. Sling in place  - PT/OT     Reported sacral wound   - Wound care consulted  - Turn and reposition q2 hours       PT/OT evals and PPD needed/ordered? Yes  Diet:  ADULT DIET; Regular; 4 carb choices (60 gm/meal); Low Fat/Low Chol/High Fiber/RAMA  VTE prophylaxis: Lovenox  Code status: Full Code    DISPO: SNF placement pending. Additional Medical Decision Making factors:  Complexity: 2 or more stable chronic illnesses. (Moderate)  Complexity: 1 acute, uncomplicated illness or injury. (Low)    I conducted an independent review of: Labs   I conducted an independent review of: Imaging and/or other diagnostic studies     Treatment Risks: Prescription drug management. (Moderate)    Social determinant affecting care: inability to care for self    Shared decision making was utilized in the care of this patient. I discussed patient's case with a . I discussed patient's case with a nurse. Hospital Problems:  Principal Problem:    Generalized weakness  Active Problems:    [facility]    HTN (hypertension)    Type 2 diabetes mellitus ([facility]    Hypothyroidism    Dementia ([facility]  Resolved Problems:    * No resolved hospital problems.  *      Objective:   Patient Vitals for the past 24 hrs:   Temp Pulse Resp BP SpO2   03/29/23 1057 98.1 °F (36.7 °C) 60 16 101/86 98 %   03/29/23 0919 -- 61 -- -- --   03/29/23 0915 -- -- 16 -- --   03/29/23 0708 97.3 °F (36.3 °C) 60 16 137/69 95 %   03/29/23 0338 98.1 °F (36.7 °C) 59 18 (!) 145/73 96 %   03/28/23 2305 98.8 °F (37.1 °C) 58 18 130/74 98 %   03/28/23 2013 98.6 °F (37 °C) 62 18 (!) 166/78 100 %   03/28/23 1442 -- 60 16 -- 99 %   03/28/23 1228 98.2 °F (36.8 °C) 59 17 (!) 109/53 99 %       Oxygen Therapy  SpO2: 98 %  Pulse Oximetry Type: Continuous  Pulse Oximeter Device Mode: Intermittent  Pulse Oximeter
supportive pain control. Started on lidocaine patch  - NWB for now. Sling in place  - PT/OT     Reported sacral wound   - Wound care consulted  - Turn and reposition q2 hours       PT/OT evals and PPD needed/ordered? Yes    DISPO: Medically ready for discharge. Awaiting placement to rehab facility. Case management on board. Diet:  ADULT DIET; Regular; 4 carb choices (60 gm/meal); Low Fat/Low Chol/High Fiber/RAMA  ADULT ORAL NUTRITION SUPPLEMENT; Dinner, Lunch, Breakfast; Diabetic Oral Supplement    VTE prophylaxis: Lovenox    Code status: Full Code    Hospital Problems:  Principal Problem:    Generalized weakness  Active Problems:    STEMI (ST elevation myocardial infarction) ([facility]    HTN (hypertension)    Type 2 diabetes mellitus ([facility]    Hypothyroidism    D[facility]  Resolved Problems:    * No resolved hospital problems. *        Objective:   Patient Vitals for the past 24 hrs:   Temp Pulse Resp BP SpO2   04/02/23 0715 97.7 °F (36.5 °C) 61 18 130/71 91 %   04/02/23 0400 98.1 °F (36.7 °C) 60 18 117/61 94 %   04/01/23 2351 99 °F (37.2 °C) 62 18 129/60 94 %   04/01/23 2127 -- -- 17 -- --   04/01/23 1934 98.8 °F (37.1 °C) 60 18 (!) 144/58 95 %   04/01/23 1510 98.8 °F (37.1 °C) 59 16 (!) 155/64 98 %   04/01/23 1300 -- -- 16 -- --   04/01/23 1230 -- -- 17 -- --   04/01/23 1139 99.1 °F (37.3 °C) 60 18 135/68 99 %       Oxygen Therapy  SpO2: 91 %  Pulse Oximetry Type: Continuous  Pulse Oximeter Device Mode: Intermittent  Pulse Oximeter Device Location: Finger  O2 Device: None (Room air)    Estimated body mass index is 22.47 kg/m² as calculated from the following:    Height as of this encounter: 5' 5\" (1.651 m). Weight as of this encounter: 135 lb (61.2 kg).     Intake/Output Summary (Last 24 hours) at 4/2/2023 1039  Last data filed at 4/1/2023 1825  Gross per 24 hour   Intake --   Output 1070 ml   Net -1070 ml         Physical Exam:     Blood pressure 130/71, pulse 61, temperature 97.7 °F (36.5 °C), temperature
technique. Once standing patient demonstrated poor standing balance with posterior lean with difficulty correcting to midline despite cues. Provided patient with rolling walker for support only but this did not improve her fear this treatment session. Patient unable to ambulate due to fear of falling. Recliner chair was brought next to patient where she was able to transfer to with mod-max assist x 2 and cues for sequencing/balance. Patient then participated in LE exercises with cues for improved quality of exercise. Slow progress towards PT goals. Will continue PT efforts.      SUBJECTIVE:   Ms. Marsha Morris states, \"I have fallen out of the bed 4 times\"     Social/Functional Lives With: Son  Type of Home: Apartment  Home Layout: One level  Home Equipment: [employer] [address] Help From: Family  ADL Assistance: Needs assistance  Toileting: Needs assistance  Homemaking Assistance: Needs assistance  Ambulation Assistance: Needs assistance  Transfer Assistance: Needs assistance  Active : No  Patient's  Info: family  Occupation: Retired  OBJECTIVE:     PAIN: Eino Juba / O2: PRECAUTION / Molly Pick / Jadon Paulher:   Pre Treatment: 0  Pain Assessment: None - Denies Pain      Post Treatment: 0 Vitals        Oxygen    Purewick    RESTRICTIONS/PRECAUTIONS:  Restrictions/Precautions  Restrictions/Precautions: Fall Risk, Bed Alarm  Restrictions/Precautions: Fall Risk, Bed Alarm     MOBILITY: I Mod I S SBA CGA Min Mod Max Total  NT x2 Comments:   Bed Mobility    Rolling [] [] [] [] [] [] [] [] [] [] []    Supine to Sit [] [] [] [] [] [] [x] [] [] [] []    Scooting [] [] [] [] [] [] [] [] [] [] []    Sit to Supine [] [] [] [] [] [] [] [] [] [] []    Transfers    Sit to Stand [] [] [] [] [] [] [x] [] [] [] [x]    Bed to Chair [] [] [] [] [] [] [x] [x] [] [] [x]    Stand to Sit [] [] [] [] [] [] [x] [] [] [] [x]     [] [] [] [] [] [] [] [] [] [] []    I=Independent, Mod I=Modified Independent, S=Supervision, SBA=Standby
with MH issues and cannot care for her at this time  - pending placement. Appreciate CM's assistance. - now on depakote sprinkles. Increase to 250mg BID  - on melatonin for sleep     Proximal R humerus fracture   - supportive pain control. Started on lidocaine patch  - NWB for now. Sling in place  - PT/OT     Reported sacral wound   - Wound care consulted  - Turn and reposition q2 hours       PT/OT evals and PPD needed/ordered? Yes    DISPO: Medically ready for discharge. Awaiting placement to rehab facility. Case management on board. Diet:  ADULT DIET; Regular; 4 carb choices (60 gm/meal); Low Fat/Low Chol/High Fiber/RAMA  ADULT ORAL NUTRITION SUPPLEMENT; Dinner, Lunch, Breakfast; Diabetic Oral Supplement    VTE prophylaxis: Lovenox    Code status: Full Code    Hospital Problems:  Principal Problem:    Generalized weakness  Active Problems:    STEMI (ST elevation myocardial infarction) ([facility]    HTN (hypertension)    Type 2 diabetes mellitus ([facility]    Hypothyroidism    Dementia ([facility]  Resolved Problems:    * No resolved hospital problems. *        Objective:   Patient Vitals for the past 24 hrs:   Temp Pulse Resp BP SpO2   04/03/23 1101 98.2 °F (36.8 °C) 62 18 (!) 157/76 99 %   04/03/23 0803 99 °F (37.2 °C) 59 18 132/65 93 %   04/03/23 0352 98.6 °F (37 °C) 59 17 (!) 145/62 95 %   04/02/23 2335 98.6 °F (37 °C) 59 18 (!) 130/53 95 %   04/02/23 1913 99 °F (37.2 °C) 63 18 (!) 152/66 98 %   04/02/23 1601 98.2 °F (36.8 °C) 61 16 (!) 141/64 98 %       Oxygen Therapy  SpO2: 99 %  Pulse Oximetry Type: Continuous  Pulse Oximeter Device Mode: Intermittent  Pulse Oximeter Device Location: Finger  O2 Device: None (Room air)    Estimated body mass index is 22.47 kg/m² as calculated from the following:    Height as of this encounter: 5' 5\" (1.651 m). Weight as of this encounter: 135 lb (61.2 kg).     Intake/Output Summary (Last 24 hours) at 4/3/2023 1333  Last data filed at 4/3/2023 0423  Gross per 24 hour   Intake 240 ml
Min=Minimal Assistance, Mod=Moderate Assistance, Max=Maximal Assistance, Total=Total Assistance, NT=Not Tested    ACTIVITIES OF DAILY LIVING: I Mod I S SBA CGA Min Mod Max Total NT Comments   BASIC ADLs:              Upper Body   Bathing [] [] [] [] [] [] [x] [] [] [] Cues to wash body parts, for sequencing, initiation and coordination seated at sink   Lower Body Bathing [] [] [] [] [] [] [] [x] [] [] Cues to wash body parts, for sequencing, initiation and coordination seated at sink   Toileting [] [] [] [] [] [] [] [] [] [x]    Upper Body Dressing [] [] [] [] [] [] [x] [] [] [] Donning/doffing gown seated   Lower Body Dressing [] [] [] [] [] [] [] [] [] [x]    Feeding [] [] [] [] [] [] [] [] [] [x]    Grooming [] [] [] [] [] [x] [] [] [] [] Washing face and combing hair seated   Personal Device Care [] [] [] [] [] [] [] [] [] [x]    Functional Mobility [] [] [] [] [] [] [x] [x] [] [] X2 HHA   I=Independent, Mod I=Modified Independent, S=Supervision/Setup, SBA=Standby Assistance, CGA=Contact Guard Assistance, Min=Minimal Assistance, Mod=Moderate Assistance, Max=Maximal Assistance, Total=Total Assistance, NT=Not Tested    BALANCE: Good Fair+ Fair Fair- Poor NT Comments   Sitting Static [] [] [x] [] [] []    Sitting Dynamic [] [] [] [x] [] []              Standing Static [] [] [] [] [x] []    Standing Dynamic [] [] [] [] [x] []        PLAN:     FREQUENCY/DURATION   OT Plan of Care: 3 times/week for duration of hospital stay or until stated goals are met, whichever comes first.    TREATMENT:     TREATMENT:   Co-Treatment PT/OT necessary due to patient's decreased overall endurance/tolerance levels, as well as need for high level skilled assistance to complete functional transfers/mobility and functional tasks  Neuromuscular Re-education (10 Minutes): Patient participated in neuromuscular re-education including weight shifting, postural training, midline training, standing tolerance activity , and sitting balance
Mod I S SBA CGA Min Mod Max Total  NT x2 Comments:   Level of Assistance [] [] [] [] [] [] [x] [] [] [] [x]    Distance   6 feet    DME HHA    Gait Quality Decreased gabi , Decreased step clearance, and Decreased step length    Weightbearing Status      Stairs      I=Independent, Mod I=Modified Independent, S=Supervision, SBA=Standby Assistance, CGA=Contact Guard Assistance,   Min=Minimal Assistance, Mod=Moderate Assistance, Max=Maximal Assistance, Total=Total Assistance, NT=Not Tested    PLAN:   FREQUENCY AND DURATION: 3 times/week for duration of hospital stay or until stated goals are met, whichever comes first.    TREATMENT:   TREATMENT:   Therapeutic Activity (23 Minutes): Therapeutic activity included Supine to Sit, Scooting, Transfer Training, Ambulation on level ground, Sitting balance , Standing balance, and LE exercises to improve functional Activity tolerance, Balance, Mobility, and Strength. TREATMENT GRID:  N/A    AFTER TREATMENT PRECAUTIONS: Alarm Activated, Bed/Chair Locked, Call light within reach, Chair, Needs within reach, and RN notified    INTERDISCIPLINARY COLLABORATION:  RN/ PCT and Rehab Attendant    EDUCATION:      TIME IN/OUT:  Time In: 0830  Time Out: 0951  Minutes: [address] [facility] Dyllan Cid PTA
Static [] [] [] [] [x] []    Standing Dynamic [] [] [] [] [x] []      GAIT: I Mod I S SBA CGA Min Mod Max Total  NT x2 Comments:   Level of Assistance [] [] [] [] [] [] [] [] [] [] []    Distance   Unable to ambulate today    DME N/A    Gait Quality Decreased gabi , Decreased step clearance, and Decreased step length    Weightbearing Status      Stairs      I=Independent, Mod I=Modified Independent, S=Supervision, SBA=Standby Assistance, CGA=Contact Guard Assistance,   Min=Minimal Assistance, Mod=Moderate Assistance, Max=Maximal Assistance, Total=Total Assistance, NT=Not Tested    PLAN:   FREQUENCY AND DURATION: 3 times/week for duration of hospital stay or until stated goals are met, whichever comes first.    TREATMENT:   TREATMENT:   Therapeutic Activity (26 Minutes): Therapeutic activity included Supine to Sit, Scooting, Transfer Training, Ambulation on level ground, Sitting balance , Standing balance, and LE exercises to improve functional Activity tolerance, Balance, Mobility, and Strength. TREATMENT GRID:  N/A    AFTER TREATMENT PRECAUTIONS: Alarm Activated, Bed/Chair Locked, Call light within reach, Chair, Needs within reach, and RN notified    INTERDISCIPLINARY COLLABORATION:  RN/ PCT, PT/ PTA, and SPTA    EDUCATION:      TIME IN/OUT:  Time In: 8952  Time Out: [facility]  Minutes: [address].  ABE Cid  [facility], [place]
Weightbearing Status      Stairs      I=Independent, Mod I=Modified Independent, S=Supervision, SBA=Standby Assistance, CGA=Contact Guard Assistance,   Min=Minimal Assistance, Mod=Moderate Assistance, Max=Maximal Assistance, Total=Total Assistance, NT=Not Tested    PLAN:   FREQUENCY AND DURATION: 3 times/week for duration of hospital stay or until stated goals are met, whichever comes first.    TREATMENT:   TREATMENT:   Therapeutic Activity (25 Minutes): Therapeutic activity included Supine to Sit, Scooting, Transfer Training, Ambulation on level ground, Sitting balance , and Standing balance to improve functional Activity tolerance, Balance, Mobility, and Strength. TREATMENT GRID:  N/A    AFTER TREATMENT PRECAUTIONS: Alarm Activated, Bed/Chair Locked, Call light within reach, Chair, Needs within reach, and RN notified    INTERDISCIPLINARY COLLABORATION:  RN/ PCT and Rehab Attendant    EDUCATION:      TIME IN/OUT:  Time In: 0830  Time Out: 0231  Minutes: [address] Diana Cid, PTA
Well nourished. Alert awake elderly female, demented,  Head:  Normocephalic, atraumatic  Eyes:  Sclerae appear normal.  Pupils equally round. ENT:  Nares appear normal.  Moist oral mucosa  Neck:  No restricted ROM. Trachea midline   CV:   RRR. No m/r/g. No jugular venous distension. Lungs:   CTAB. No wheezing. Symmetric expansion. Abdomen:   Soft, nontender, nondistended. Extremities: No cyanosis or clubbing. No edema, Rt arm movement limited due to pain. Rt arm in sling  Skin:     No rashes and normal coloration. Warm and dry. Neuro:  CN II-XII grossly intact. Psych:  Normal mood and affect.       I have personally reviewed labs and tests:  Recent Labs:  Recent Results (from the past 48 hour(s))   POCT Glucose    Collection Time: 03/28/23  4:02 PM   Result Value Ref Range    POC Glucose 185 (H) 65 - 100 mg/dL    Performed by: CamachoPCNICOLE    POCT Glucose    Collection Time: 03/28/23  8:17 PM   Result Value Ref Range    POC Glucose 169 (H) 65 - 100 mg/dL    Performed by: ChagolasDeandraPCT    POCT Glucose    Collection Time: 03/29/23  6:22 AM   Result Value Ref Range    POC Glucose 132 (H) 65 - 100 mg/dL    Performed by: DashDouglasDeandraPCT    POCT Glucose    Collection Time: 03/29/23 10:58 AM   Result Value Ref Range    POC Glucose 195 (H) 65 - 100 mg/dL    Performed by: RamuPCAAngela    POCT Glucose    Collection Time: 03/29/23  3:02 PM   Result Value Ref Range    POC Glucose 138 (H) 65 - 100 mg/dL    Performed by: RamuPCAAngela    POCT Glucose    Collection Time: 03/29/23  8:57 PM   Result Value Ref Range    POC Glucose 148 (H) 65 - 100 mg/dL    Performed by: Cristi    POCT Glucose    Collection Time: 03/30/23  6:48 AM   Result Value Ref Range    POC Glucose 126 (H) 65 - 100 mg/dL    Performed by: Alison Cruz    CBC with Auto Differential    Collection Time: 03/30/23  8:14 AM   Result Value Ref Range    WBC 7.5 4.3 - 11.1 K/uL    RBC 3.08 (L) 4.05 - 5.2 M/uL
as of this encounter: 135 lb (61.2 kg). Intake/Output Summary (Last 24 hours) at 3/28/2023 1252  Last data filed at 3/28/2023 0803  Gross per 24 hour   Intake 360 ml   Output 1300 ml   Net -940 ml         Physical Exam:     Blood pressure (!) 109/53, pulse 59, temperature 98.2 °F (36.8 °C), temperature source Oral, resp. rate 17, height 5' 5\" (1.651 m), weight 135 lb (61.2 kg), SpO2 99 %. General:    Well nourished. Head:  Normocephalic, atraumatic  Eyes:  Sclerae appear normal.  Pupils equally round. ENT:  Nares appear normal.  Moist oral mucosa  Neck:  No restricted ROM. Trachea midline   CV:   RRR. No m/r/g. No jugular venous distension. Lungs:   CTAB. No wheezing. Symmetric expansion. Abdomen:   Soft, nontender, nondistended. Extremities: No cyanosis or clubbing. No edema, Rt arm movement limited due to pain. Skin:     No rashes and normal coloration. Warm and dry. Neuro:  CN II-XII grossly intact. Psych:  Normal mood and affect.       I have personally reviewed labs and tests:  Recent Labs:  Recent Results (from the past 48 hour(s))   POCT Glucose    Collection Time: 03/26/23  4:41 PM   Result Value Ref Range    POC Glucose 137 (H) 65 - 100 mg/dL    Performed by: Jose Luong 90 PPD TEST IN 48 HRS    Collection Time: 03/26/23  5:06 PM   Result Value Ref Range    PPD, (POC) Negative Negative    mm Induration 0 0 - 5 mm   POCT Glucose    Collection Time: 03/26/23  8:32 PM   Result Value Ref Range    POC Glucose 211 (H) 65 - 100 mg/dL    Performed by: TracieyPCT    POCT Glucose    Collection Time: 03/27/23  6:37 AM   Result Value Ref Range    POC Glucose 171 (H) 65 - 100 mg/dL    Performed by: EsauzyPCT    POCT Glucose    Collection Time: 03/27/23 11:10 AM   Result Value Ref Range    POC Glucose 180 (H) 65 - 100 mg/dL    Performed by: Muriel    POCT Glucose    Collection Time: 03/27/23  4:23 PM   Result Value Ref Range    POC Glucose 183
0 /lpf    Yeast, UA OCCASIONAL     POCT Glucose    Collection Time: 03/25/23  8:15 PM   Result Value Ref Range    POC Glucose 216 (H) 65 - 100 mg/dL    Performed by: MediSapienskenzyPCT    POCT Glucose    Collection Time: 03/26/23  6:09 AM   Result Value Ref Range    POC Glucose 138 (H) 65 - 100 mg/dL    Performed by: DokDokMakenzyPCT    POCT Glucose    Collection Time: 03/26/23 11:17 AM   Result Value Ref Range    POC Glucose 164 (H) 65 - 100 mg/dL    Performed by: Juvencio    POCT Glucose    Collection Time: 03/26/23  4:41 PM   Result Value Ref Range    POC Glucose 137 (H) 65 - 100 mg/dL    Performed by: Iron    PLEASE READ & DOCUMENT PPD TEST IN 48 HRS    Collection Time: 03/26/23  5:06 PM   Result Value Ref Range    PPD, (POC) Negative Negative    mm Induration 0 0 - 5 mm   POCT Glucose    Collection Time: 03/26/23  8:32 PM   Result Value Ref Range    POC Glucose 211 (H) 65 - 100 mg/dL    Performed by: DokDokMakenzyPCT    POCT Glucose    Collection Time: 03/27/23  6:37 AM   Result Value Ref Range    POC Glucose 171 (H) 65 - 100 mg/dL    Performed by: DokDokMakenzyPCT    POCT Glucose    Collection Time: 03/27/23 11:10 AM   Result Value Ref Range    POC Glucose 180 (H) 65 - 100 mg/dL    Performed by: Muriel          Other Studies:  No orders to display       Current Meds:  Current Facility-Administered Medications   Medication Dose Route Frequency    hydrALAZINE (APRESOLINE) tablet 25 mg  25 mg Oral Q6H PRN    ibuprofen (ADVIL;MOTRIN) tablet 400 mg  400 mg Oral Q8H PRN    traMADol (ULTRAM) tablet 25 mg  25 mg Oral Q6H PRN    aspirin chewable tablet 81 mg  81 mg Oral Daily    atorvastatin (LIPITOR) tablet 80 mg  80 mg Oral Nightly    carvedilol (COREG) tablet 6.25 mg  6.25 mg Oral BID WC    clopidogrel (PLAVIX) tablet 75 mg  75 mg Oral Daily    levothyroxine (SYNTHROID) tablet 100 mcg  100 mcg Oral QAM AC    lisinopril (PRINIVIL;ZESTRIL) tablet 10 mg  10 mg Oral Daily    megestrol (MEGACE)
patch TransDERmal Daily    melatonin tablet 3 mg  3 mg Oral Nightly    hydrALAZINE (APRESOLINE) tablet 25 mg  25 mg Oral Q6H PRN    ibuprofen (ADVIL;MOTRIN) tablet 400 mg  400 mg Oral Q8H PRN    traMADol (ULTRAM) tablet 25 mg  25 mg Oral Q6H PRN    aspirin chewable tablet 81 mg  81 mg Oral Daily    atorvastatin (LIPITOR) tablet 80 mg  80 mg Oral Nightly    [Held by provider] carvedilol (COREG) tablet 6.25 mg  6.25 mg Oral BID WC    clopidogrel (PLAVIX) tablet 75 mg  75 mg Oral Daily    levothyroxine (SYNTHROID) tablet 100 mcg  100 mcg Oral QAM AC    lisinopril (PRINIVIL;ZESTRIL) tablet 10 mg  10 mg Oral Daily    megestrol (MEGACE) tablet 40 mg  40 mg Oral Daily    pantoprazole (PROTONIX) tablet 40 mg  40 mg Oral QAM AC    insulin lispro (HUMALOG) injection vial 0-8 Units  0-8 Units SubCUTAneous TID WC    insulin lispro (HUMALOG) injection vial 0-4 Units  0-4 Units SubCUTAneous Nightly    sodium chloride flush 0.9 % injection 5-40 mL  5-40 mL IntraVENous 2 times per day    sodium chloride flush 0.9 % injection 5-40 mL  5-40 mL IntraVENous PRN    0.9 % sodium chloride infusion   IntraVENous PRN    enoxaparin (LOVENOX) injection 40 mg  40 mg SubCUTAneous Daily    ondansetron (ZOFRAN-ODT) disintegrating tablet 4 mg  4 mg Oral Q8H PRN    Or    ondansetron (ZOFRAN) injection 4 mg  4 mg IntraVENous Q6H PRN    polyethylene glycol (GLYCOLAX) packet 17 g  17 g Oral Daily PRN    acetaminophen (TYLENOL) tablet 650 mg  650 mg Oral Q6H PRN    Or    acetaminophen (TYLENOL) suppository 650 mg  650 mg Rectal Q6H PRN       Signed:  Sidra Cxo MD    Part of this note may have been written by using a voice dictation software. The note has been proof read but may still contain some grammatical/other typographical errors.

## 2023-04-05 NOTE — PLAN OF CARE
Problem: Discharge Planning  Goal: Discharge to home or other facility with appropriate resources  4/3/2023 2252 by Cindy Garza RN  Outcome: Progressing  4/3/2023 1042 by Tigist Sahni RN  Outcome: Progressing     Problem: Safety - Adult  Goal: Free from fall injury  4/3/2023 2252 by Cindy Garza RN  Outcome: Progressing  4/3/2023 1042 by Tigist Sahni RN  Outcome: Progressing     Problem: Pain  Goal: Verbalizes/displays adequate comfort level or baseline comfort level  4/3/2023 2252 by Cindy Garza RN  Outcome: Progressing  4/3/2023 1042 by Tigist Sahni RN  Outcome: Progressing     Problem: Confusion  Goal: Confusion, delirium, dementia, or psychosis is improved or at baseline  Description: INTERVENTIONS:  1. Assess for possible contributors to thought disturbance, including medications, impaired vision or hearing, underlying metabolic abnormalities, dehydration, psychiatric diagnoses, and notify attending LIP  2. [place] high risk fall precautions, as indicated  3. Provide frequent short contacts to provide reality reorientation, refocusing and direction  4. Decrease environmental stimuli, including noise as appropriate  5. Monitor and intervene to maintain adequate nutrition, hydration, elimination, sleep and activity  6. If unable to ensure safety without constant attention obtain sitter and review sitter guidelines with assigned personnel  7. Initiate Psychosocial CNS and Spiritual Care consult, as indicated  4/3/2023 2252 by Cindy Garza RN  Outcome: Progressing  4/3/2023 1042 by Tigist Sahni RN  Outcome: Progressing     Problem: Skin/Tissue Integrity  Goal: Absence of new skin breakdown  Description: 1. Monitor for areas of redness and/or skin breakdown  2. Assess vascular access sites hourly  3. Every 4-6 hours minimum:  Change oxygen saturation probe site  4.   Every 4-6 hours:  If on nasal continuous positive airway pressure, respiratory therapy assess nares and determine need for
Problem: Discharge Planning  Goal: Discharge to home or other facility with appropriate resources  Outcome: Progressing     Problem: Skin/Tissue Integrity  Goal: Absence of new skin breakdown  Description: 1. Monitor for areas of redness and/or skin breakdown  2. Assess vascular access sites hourly  3. Every 4-6 hours minimum:  Change oxygen saturation probe site  4. Every 4-6 hours:  If on nasal continuous positive airway pressure, respiratory therapy assess nares and determine need for appliance change or resting period. Outcome: Progressing     Problem: ABCDS Injury Assessment  Goal: Absence of physical injury  Outcome: Progressing     Problem: Discharge Planning  Goal: Discharge to home or other facility with appropriate resources  4/1/2023 2201 by Nathan Morales RN  Outcome: Progressing  4/1/2023 2159 by Nathan Morales RN  Outcome: Progressing     Problem: Safety - Adult  Goal: Free from fall injury  4/1/2023 2201 by Nathan Morales RN  Outcome: Progressing  4/1/2023 2159 by Nathan Morales RN  [facility] (Taken 3/30/2023 1943 by Karen Muse RN)  Free From Fall Injury: Instruct family/caregiver on patient safety     Problem: Pain  Goal: Verbalizes/displays adequate comfort level or baseline comfort level  4/1/2023 2201 by Nathan Morales RN  Outcome: Progressing  4/1/2023 2159 by Nathan Morales RN  Flowsheets (Taken 4/1/2023 2159)  Verbalizes/displays adequate comfort level or baseline comfort level:   Encourage patient to monitor pain and request assistance   Assess pain using appropriate pain scale   Administer analgesics based on type and severity of pain and evaluate response     Problem: Confusion  Goal: Confusion, delirium, dementia, or psychosis is improved or at baseline  Description: INTERVENTIONS:  1.  Assess for possible contributors to thought disturbance, including medications, impaired vision or hearing, underlying metabolic abnormalities, dehydration, psychiatric diagnoses, and
Problem: Discharge Planning  Goal: Discharge to home or other facility with appropriate resources  Outcome: Progressing  Flowsheets (Taken 4/4/2023 2037)  Discharge to home or other facility with appropriate resources: Identify barriers to discharge with patient and caregiver     Problem: Safety - Adult  Goal: Free from fall injury  Outcome: Progressing     Problem: Pain  Goal: Verbalizes/displays adequate comfort level or baseline comfort level  Outcome: Progressing  Flowsheets  Taken 4/4/2023 1634 by Radha Acosta RN  Verbalizes/displays adequate comfort level or baseline comfort level: Encourage patient to monitor pain and request assistance  Taken 4/4/2023 1113 by Radha Acosta RN  Verbalizes/displays adequate comfort level or baseline comfort level: Encourage patient to monitor pain and request assistance     Problem: Confusion  Goal: Confusion, delirium, dementia, or psychosis is improved or at baseline  Description: INTERVENTIONS:  1. Assess for possible contributors to thought disturbance, including medications, impaired vision or hearing, underlying metabolic abnormalities, dehydration, psychiatric diagnoses, and notify attending LIP  2. [place] high risk fall precautions, as indicated  3. Provide frequent short contacts to provide reality reorientation, refocusing and direction  4. Decrease environmental stimuli, including noise as appropriate  5. Monitor and intervene to maintain adequate nutrition, hydration, elimination, sleep and activity  6. If unable to ensure safety without constant attention obtain sitter and review sitter guidelines with assigned personnel  7.  Initiate Psychosocial CNS and Spiritual Care consult, as indicated  Outcome: Progressing  Flowsheets (Taken 4/4/2023 0745 by Radha Acosta RN)  Effect of thought disturbance (confusion, delirium, dementia, or psychosis) are managed with adequate functional status: Assess for contributors to thought disturbance, including
Adult  Goal: Return mobility to safest level of function  Outcome: Progressing  Flowsheets (Taken 4/2/2023 0735 by Reta Rogers RN)  Return Mobility to Safest Level of Function: Assess patient stability and activity tolerance for standing, transferring and ambulating with or without assistive devices  Goal: Maintain proper alignment of affected body part  Outcome: Progressing  Flowsheets (Taken 4/2/2023 0735 by Reta Rogers RN)  Maintain proper alignment of affected body part: Support and protect limb and body alignment per provider's orders     Problem: Gastrointestinal - Adult  Goal: Minimal or absence of nausea and vomiting  Outcome: Progressing  Goal: Maintains or returns to baseline bowel function  Outcome: Progressing  Flowsheets (Taken 4/2/2023 0735 by Reta Rogers RN)  Maintains or returns to baseline bowel function: Assess bowel function  Goal: Maintains adequate nutritional intake  Outcome: Progressing     Problem: Genitourinary - Adult  Goal: Absence of urinary retention  Outcome: Progressing  Flowsheets (Taken 4/2/2023 0735 by Reta Rogers RN)  Absence of urinary retention: Assess patients ability to void and empty bladder     Problem: Infection - Adult  Goal: Absence of infection at discharge  Outcome: Progressing  Flowsheets (Taken 4/2/2023 0735 by Reta Rogers RN)  Absence of infection at discharge: Assess and monitor for signs and symptoms of infection  Goal: Absence of infection during hospitalization  Outcome: Progressing     Problem: Metabolic/Fluid and Electrolytes - Adult  Goal: Electrolytes maintained within normal limits  Outcome: Progressing  Goal: Hemodynamic stability and optimal renal function maintained  Outcome: Progressing  Goal: Glucose maintained within prescribed range  Outcome: Progressing     Problem: Hematologic - Adult  Goal: Maintains hematologic stability  Outcome: Progressing  Flowsheets (Taken 4/2/2023 0735 by Reta Rogers RN)  Maintains hematologic

## (undated) DEVICE — GUIDEWIRE WITH ICE™ HYDROPHILIC COATING: Brand: CHOICE™ PT

## (undated) DEVICE — GLIDESHEATH SLENDER STAINLESS STEEL KIT: Brand: GLIDESHEATH SLENDER

## (undated) DEVICE — BAND COMPR L24CM REG CLR PLAS HEMSTAT EXT HK AND LOOP RETEN

## (undated) DEVICE — COPILOT BLEEDBACK CONTROL VALVE: Brand: COPILOT

## (undated) DEVICE — CATH BLLN ANGIO 2.50X15MM SC EUPHORA RX

## (undated) DEVICE — CATHETER DIAG AD 5FR L110CM 145DEG COR GRY HYDRPHLC NYL

## (undated) DEVICE — CATHETER COR DIAG JUDKINS L 3.5 CRV 6FR 100CM 0 SIDE H

## (undated) DEVICE — CATHETER GUID 5FR GWIRE 0.058IN COR EXTRA BKUP SUPP 3.5 ACT

## (undated) DEVICE — CATHETER DIAG AD 6FR L100CM 0.038IN COR POLYUR JUDKINS R 5

## (undated) DEVICE — RUNTHROUGH NS EXTRA FLOPPY PTCA GUIDEWIRE: Brand: RUNTHROUGH

## (undated) DEVICE — DEVICE COMPR REG 24 CM VASC BND

## (undated) DEVICE — GUIDEWIRE 035IN 210CM PTFE COAT FIX COR J TIP 15MM FIRM BODY

## (undated) DEVICE — CATH BLLN ANGIO 3.50X15MM NC EUPHORA RX

## (undated) DEVICE — CATHETER GUID 6FR L100CM DIA0.071IN NYL SHFT JR5.0 W/O SIDE

## (undated) DEVICE — CATHETER GUID 6FR L150CM RAP EXCHG L25CM TIP 5.1FR PUSHROD